# Patient Record
Sex: MALE | Race: WHITE | HISPANIC OR LATINO | Employment: FULL TIME | URBAN - METROPOLITAN AREA
[De-identification: names, ages, dates, MRNs, and addresses within clinical notes are randomized per-mention and may not be internally consistent; named-entity substitution may affect disease eponyms.]

---

## 2018-07-21 ENCOUNTER — HOSPITAL ENCOUNTER (EMERGENCY)
Facility: HOSPITAL | Age: 63
Discharge: HOME | End: 2018-07-21
Attending: FAMILY MEDICINE

## 2018-07-21 VITALS
HEART RATE: 84 BPM | TEMPERATURE: 98 F | BODY MASS INDEX: 29.96 KG/M2 | OXYGEN SATURATION: 98 % | HEIGHT: 71 IN | WEIGHT: 214 LBS | DIASTOLIC BLOOD PRESSURE: 72 MMHG | RESPIRATION RATE: 16 BRPM | SYSTOLIC BLOOD PRESSURE: 121 MMHG

## 2018-07-21 DIAGNOSIS — E86.0 DEHYDRATION: Primary | ICD-10-CM

## 2018-07-21 LAB
ALBUMIN SERPL BCP-MCNC: 4.5 G/DL
ALP SERPL-CCNC: 91 U/L
ALT SERPL W/O P-5'-P-CCNC: 22 U/L
ANION GAP SERPL CALC-SCNC: 13 MMOL/L
ANION GAP SERPL CALC-SCNC: 9 MMOL/L
AST SERPL-CCNC: 23 U/L
BACTERIA #/AREA URNS AUTO: ABNORMAL /HPF
BASOPHILS # BLD AUTO: 0.02 K/UL
BASOPHILS NFR BLD: 0.3 %
BILIRUB SERPL-MCNC: 0.6 MG/DL
BILIRUB UR QL STRIP: ABNORMAL
BUN SERPL-MCNC: 31 MG/DL
BUN SERPL-MCNC: 34 MG/DL
CALCIUM SERPL-MCNC: 7.9 MG/DL
CALCIUM SERPL-MCNC: 9.1 MG/DL
CAOX CRY UR QL COMP ASSIST: ABNORMAL
CHLORIDE SERPL-SCNC: 100 MMOL/L
CHLORIDE SERPL-SCNC: 104 MMOL/L
CLARITY UR REFRACT.AUTO: ABNORMAL
CO2 SERPL-SCNC: 23 MMOL/L
CO2 SERPL-SCNC: 24 MMOL/L
COLOR UR AUTO: ABNORMAL
CREAT SERPL-MCNC: 1.99 MG/DL
CREAT SERPL-MCNC: 2.48 MG/DL
DIFFERENTIAL METHOD: NORMAL
EOSINOPHIL # BLD AUTO: 0.1 K/UL
EOSINOPHIL NFR BLD: 1.9 %
ERYTHROCYTE [DISTWIDTH] IN BLOOD BY AUTOMATED COUNT: 13.9 %
EST. GFR  (AFRICAN AMERICAN): 31.2 ML/MIN/1.73 M^2
EST. GFR  (AFRICAN AMERICAN): 40.7 ML/MIN/1.73 M^2
EST. GFR  (NON AFRICAN AMERICAN): 27 ML/MIN/1.73 M^2
EST. GFR  (NON AFRICAN AMERICAN): 35.2 ML/MIN/1.73 M^2
GLUCOSE SERPL-MCNC: 175 MG/DL
GLUCOSE SERPL-MCNC: 244 MG/DL
GLUCOSE UR QL STRIP: ABNORMAL
HCT VFR BLD AUTO: 44.1 %
HGB BLD-MCNC: 14.6 G/DL
HGB UR QL STRIP: NEGATIVE
HYALINE CASTS UR QL AUTO: 3 /LPF
KETONES UR QL STRIP: ABNORMAL
LEUKOCYTE ESTERASE UR QL STRIP: ABNORMAL
LYMPHOCYTES # BLD AUTO: 1.5 K/UL
LYMPHOCYTES NFR BLD: 22.8 %
MCH RBC QN AUTO: 29.8 PG
MCHC RBC AUTO-ENTMCNC: 33.1 G/DL
MCV RBC AUTO: 90 FL
MICROSCOPIC COMMENT: ABNORMAL
MONOCYTES # BLD AUTO: 0.5 K/UL
MONOCYTES NFR BLD: 7.7 %
NEUTROPHILS # BLD AUTO: 4.3 K/UL
NEUTROPHILS NFR BLD: 67 %
NITRITE UR QL STRIP: NEGATIVE
PH UR STRIP: 5 [PH] (ref 5–8)
PLATELET # BLD AUTO: 263 K/UL
PMV BLD AUTO: 9.6 FL
POCT GLUCOSE: 228 MG/DL (ref 70–110)
POTASSIUM SERPL-SCNC: 4.5 MMOL/L
POTASSIUM SERPL-SCNC: 4.7 MMOL/L
PROT SERPL-MCNC: 8.2 G/DL
PROT UR QL STRIP: ABNORMAL
RBC # BLD AUTO: 4.9 M/UL
RBC #/AREA URNS AUTO: 0 /HPF (ref 0–4)
SODIUM SERPL-SCNC: 136 MMOL/L
SODIUM SERPL-SCNC: 137 MMOL/L
SP GR UR STRIP: 1.02 (ref 1–1.03)
URN SPEC COLLECT METH UR: ABNORMAL
UROBILINOGEN UR STRIP-ACNC: 1 EU/DL
WBC # BLD AUTO: 6.39 K/UL
WBC #/AREA URNS AUTO: 2 /HPF (ref 0–5)

## 2018-07-21 PROCEDURE — 25000003 PHARM REV CODE 250: Performed by: FAMILY MEDICINE

## 2018-07-21 PROCEDURE — 96360 HYDRATION IV INFUSION INIT: CPT

## 2018-07-21 PROCEDURE — 99284 EMERGENCY DEPT VISIT MOD MDM: CPT | Mod: 25

## 2018-07-21 PROCEDURE — 85025 COMPLETE CBC W/AUTO DIFF WBC: CPT

## 2018-07-21 PROCEDURE — 80048 BASIC METABOLIC PNL TOTAL CA: CPT

## 2018-07-21 PROCEDURE — 96372 THER/PROPH/DIAG INJ SC/IM: CPT

## 2018-07-21 PROCEDURE — 63600175 PHARM REV CODE 636 W HCPCS: Performed by: FAMILY MEDICINE

## 2018-07-21 PROCEDURE — 81000 URINALYSIS NONAUTO W/SCOPE: CPT

## 2018-07-21 PROCEDURE — 80053 COMPREHEN METABOLIC PANEL: CPT

## 2018-07-21 PROCEDURE — 82962 GLUCOSE BLOOD TEST: CPT

## 2018-07-21 PROCEDURE — 96361 HYDRATE IV INFUSION ADD-ON: CPT

## 2018-07-21 RX ORDER — INSULIN ASPART 100 [IU]/ML
20 INJECTION, SOLUTION INTRAVENOUS; SUBCUTANEOUS
Status: DISCONTINUED | OUTPATIENT
Start: 2018-07-21 | End: 2018-07-21

## 2018-07-21 RX ORDER — INSULIN ASPART 100 [IU]/ML
10 INJECTION, SOLUTION INTRAVENOUS; SUBCUTANEOUS
Status: COMPLETED | OUTPATIENT
Start: 2018-07-21 | End: 2018-07-21

## 2018-07-21 RX ADMIN — SODIUM CHLORIDE 1000 ML: 0.9 INJECTION, SOLUTION INTRAVENOUS at 08:07

## 2018-07-21 RX ADMIN — SODIUM CHLORIDE 1000 ML: 0.9 INJECTION, SOLUTION INTRAVENOUS at 07:07

## 2018-07-21 RX ADMIN — INSULIN ASPART 10 UNITS: 100 INJECTION, SOLUTION INTRAVENOUS; SUBCUTANEOUS at 07:07

## 2018-07-22 NOTE — ED PROVIDER NOTES
Encounter Date: 7/21/2018       History     Chief Complaint   Patient presents with    Hyperglycemia     Pt reports  today, over 500 yesterday.  Pt c/o fatigue and dysuria this PM. CBG on triage 228.     HPI  Review of patient's allergies indicates:  No Known Allergies  No past medical history on file.  No past surgical history on file.  No family history on file.  Social History   Substance Use Topics    Smoking status: Not on file    Smokeless tobacco: Not on file    Alcohol use Not on file     Review of Systems    Physical Exam     Initial Vitals [07/21/18 1919]   BP Pulse Resp Temp SpO2   (!) 101/55 101 20 97.8 °F (36.6 °C) 95 %      MAP       --         Physical Exam    ED Course   Procedures  Labs Reviewed   POCT GLUCOSE - Abnormal; Notable for the following:        Result Value    POCT Glucose 228 (*)     All other components within normal limits          Imaging Results    None                               Clinical Impression:   The encounter diagnosis was Dehydration.                             Peter Brooks MD  07/21/18 0183

## 2021-06-01 ENCOUNTER — CLINICAL SUPPORT (OUTPATIENT)
Dept: URGENT CARE | Facility: CLINIC | Age: 66
End: 2021-06-01

## 2021-06-01 DIAGNOSIS — Z78.9 NO KNOWN HEALTH PROBLEMS: Primary | ICD-10-CM

## 2021-06-01 LAB
CTP QC/QA: YES
SARS-COV-2 RDRP RESP QL NAA+PROBE: NEGATIVE

## 2021-06-01 PROCEDURE — 99211 OFF/OP EST MAY X REQ PHY/QHP: CPT | Mod: S$GLB,,, | Performed by: FAMILY MEDICINE

## 2021-06-01 PROCEDURE — U0002 COVID-19 LAB TEST NON-CDC: HCPCS | Mod: QW,S$GLB,, | Performed by: FAMILY MEDICINE

## 2021-06-01 PROCEDURE — 99211 PR OFFICE/OUTPT VISIT, EST, LEVL I: ICD-10-PCS | Mod: S$GLB,,, | Performed by: FAMILY MEDICINE

## 2021-06-01 PROCEDURE — U0002: ICD-10-PCS | Mod: QW,S$GLB,, | Performed by: FAMILY MEDICINE

## 2023-05-25 ENCOUNTER — HOSPITAL ENCOUNTER (EMERGENCY)
Facility: HOSPITAL | Age: 68
Discharge: HOME | End: 2023-05-25
Attending: STUDENT IN AN ORGANIZED HEALTH CARE EDUCATION/TRAINING PROGRAM
Payer: OTHER MISCELLANEOUS

## 2023-05-25 VITALS
SYSTOLIC BLOOD PRESSURE: 151 MMHG | RESPIRATION RATE: 16 BRPM | HEART RATE: 59 BPM | TEMPERATURE: 98 F | BODY MASS INDEX: 28.14 KG/M2 | OXYGEN SATURATION: 98 % | WEIGHT: 201 LBS | HEIGHT: 71 IN | DIASTOLIC BLOOD PRESSURE: 52 MMHG

## 2023-05-25 DIAGNOSIS — M25.511 RIGHT SHOULDER PAIN: ICD-10-CM

## 2023-05-25 DIAGNOSIS — R42 LIGHTHEADEDNESS: ICD-10-CM

## 2023-05-25 DIAGNOSIS — M54.2 CERVICAL PAIN (NECK): ICD-10-CM

## 2023-05-25 DIAGNOSIS — R42 DIZZY: ICD-10-CM

## 2023-05-25 LAB
ALBUMIN SERPL BCP-MCNC: 3.9 G/DL (ref 3.5–5.2)
ALP SERPL-CCNC: 64 U/L (ref 55–135)
ALT SERPL W/O P-5'-P-CCNC: 15 U/L (ref 10–44)
ANION GAP SERPL CALC-SCNC: 11 MMOL/L (ref 8–16)
AST SERPL-CCNC: 19 U/L (ref 10–40)
BASOPHILS # BLD AUTO: 0.04 K/UL (ref 0–0.2)
BASOPHILS NFR BLD: 0.5 % (ref 0–1.9)
BILIRUB SERPL-MCNC: 0.2 MG/DL (ref 0.1–1)
BNP SERPL-MCNC: 21 PG/ML (ref 0–99)
BUN SERPL-MCNC: 30 MG/DL (ref 8–23)
CALCIUM SERPL-MCNC: 9.3 MG/DL (ref 8.7–10.5)
CHLORIDE SERPL-SCNC: 105 MMOL/L (ref 95–110)
CO2 SERPL-SCNC: 23 MMOL/L (ref 23–29)
CREAT SERPL-MCNC: 1.4 MG/DL (ref 0.5–1.4)
DIFFERENTIAL METHOD BLD: ABNORMAL
EOSINOPHIL # BLD AUTO: 0.2 K/UL (ref 0–0.5)
EOSINOPHIL NFR BLD: 2.6 % (ref 0–8)
ERYTHROCYTE [DISTWIDTH] IN BLOOD BY AUTOMATED COUNT: 13.6 % (ref 11.5–14.5)
EST. GFR  (NO RACE VARIABLE): 55 ML/MIN/1.73 M^2
GLUCOSE SERPL-MCNC: 130 MG/DL (ref 70–110)
HCT VFR BLD AUTO: 42.6 % (ref 40–54)
HGB BLD-MCNC: 13.9 G/DL (ref 14–18)
IMM GRANULOCYTES # BLD AUTO: 0.01 K/UL (ref 0–0.04)
IMM GRANULOCYTES NFR BLD AUTO: 0.1 % (ref 0–0.5)
LYMPHOCYTES # BLD AUTO: 3.1 K/UL (ref 1–4.8)
LYMPHOCYTES NFR BLD: 38.9 % (ref 18–48)
MCH RBC QN AUTO: 30.2 PG (ref 27–31)
MCHC RBC AUTO-ENTMCNC: 32.6 G/DL (ref 32–36)
MCV RBC AUTO: 93 FL (ref 82–98)
MONOCYTES # BLD AUTO: 0.8 K/UL (ref 0.3–1)
MONOCYTES NFR BLD: 9.8 % (ref 4–15)
NEUTROPHILS # BLD AUTO: 3.9 K/UL (ref 1.8–7.7)
NEUTROPHILS NFR BLD: 48.1 % (ref 38–73)
NRBC BLD-RTO: 0 /100 WBC
PLATELET # BLD AUTO: 249 K/UL (ref 150–450)
PMV BLD AUTO: 9.2 FL (ref 9.2–12.9)
POTASSIUM SERPL-SCNC: 4.4 MMOL/L (ref 3.5–5.1)
PROT SERPL-MCNC: 7.7 G/DL (ref 6–8.4)
RBC # BLD AUTO: 4.6 M/UL (ref 4.6–6.2)
SODIUM SERPL-SCNC: 139 MMOL/L (ref 136–145)
TROPONIN I SERPL DL<=0.01 NG/ML-MCNC: 0.01 NG/ML (ref 0–0.03)
WBC # BLD AUTO: 8.05 K/UL (ref 3.9–12.7)

## 2023-05-25 PROCEDURE — 96374 THER/PROPH/DIAG INJ IV PUSH: CPT

## 2023-05-25 PROCEDURE — 80053 COMPREHEN METABOLIC PANEL: CPT | Performed by: PHYSICIAN ASSISTANT

## 2023-05-25 PROCEDURE — 93010 ELECTROCARDIOGRAM REPORT: CPT | Mod: ,,, | Performed by: INTERNAL MEDICINE

## 2023-05-25 PROCEDURE — 84484 ASSAY OF TROPONIN QUANT: CPT | Performed by: PHYSICIAN ASSISTANT

## 2023-05-25 PROCEDURE — 25000003 PHARM REV CODE 250: Performed by: STUDENT IN AN ORGANIZED HEALTH CARE EDUCATION/TRAINING PROGRAM

## 2023-05-25 PROCEDURE — 63600175 PHARM REV CODE 636 W HCPCS: Performed by: STUDENT IN AN ORGANIZED HEALTH CARE EDUCATION/TRAINING PROGRAM

## 2023-05-25 PROCEDURE — 85025 COMPLETE CBC W/AUTO DIFF WBC: CPT | Performed by: PHYSICIAN ASSISTANT

## 2023-05-25 PROCEDURE — 99285 EMERGENCY DEPT VISIT HI MDM: CPT | Mod: 25

## 2023-05-25 PROCEDURE — 96375 TX/PRO/DX INJ NEW DRUG ADDON: CPT

## 2023-05-25 PROCEDURE — 83880 ASSAY OF NATRIURETIC PEPTIDE: CPT | Performed by: PHYSICIAN ASSISTANT

## 2023-05-25 PROCEDURE — 93005 ELECTROCARDIOGRAM TRACING: CPT

## 2023-05-25 RX ORDER — MECLIZINE HYDROCHLORIDE 25 MG/1
50 TABLET ORAL
Status: COMPLETED | OUTPATIENT
Start: 2023-05-25 | End: 2023-05-25

## 2023-05-25 RX ORDER — METHOCARBAMOL 500 MG/1
500 TABLET, FILM COATED ORAL 3 TIMES DAILY
Qty: 20 TABLET | Refills: 0 | Status: SHIPPED | OUTPATIENT
Start: 2023-05-25 | End: 2023-06-01

## 2023-05-25 RX ORDER — KETOROLAC TROMETHAMINE 10 MG/1
10 TABLET, FILM COATED ORAL EVERY 8 HOURS
Qty: 15 TABLET | Refills: 0 | Status: SHIPPED | OUTPATIENT
Start: 2023-05-25 | End: 2023-05-30

## 2023-05-25 RX ORDER — KETOROLAC TROMETHAMINE 30 MG/ML
15 INJECTION, SOLUTION INTRAMUSCULAR; INTRAVENOUS
Status: COMPLETED | OUTPATIENT
Start: 2023-05-25 | End: 2023-05-25

## 2023-05-25 RX ORDER — MECLIZINE HYDROCHLORIDE 25 MG/1
25 TABLET ORAL 3 TIMES DAILY PRN
Qty: 20 TABLET | Refills: 0 | Status: SHIPPED | OUTPATIENT
Start: 2023-05-25 | End: 2023-06-18

## 2023-05-25 RX ORDER — ORPHENADRINE CITRATE 30 MG/ML
30 INJECTION INTRAMUSCULAR; INTRAVENOUS
Status: COMPLETED | OUTPATIENT
Start: 2023-05-25 | End: 2023-05-25

## 2023-05-25 RX ADMIN — MECLIZINE HYDROCHLORIDE 50 MG: 25 TABLET ORAL at 08:05

## 2023-05-25 RX ADMIN — SODIUM CHLORIDE 1000 ML: 0.9 INJECTION, SOLUTION INTRAVENOUS at 09:05

## 2023-05-25 RX ADMIN — ORPHENADRINE CITRATE 30 MG: 60 INJECTION INTRAMUSCULAR; INTRAVENOUS at 09:05

## 2023-05-25 RX ADMIN — KETOROLAC TROMETHAMINE 15 MG: 30 INJECTION INTRAMUSCULAR; INTRAVENOUS at 08:05

## 2023-05-26 NOTE — FIRST PROVIDER EVALUATION
Emergency Department TeleTriage Encounter Note      CHIEF COMPLAINT    Chief Complaint   Patient presents with    Dizziness     Reports episodes of dizziness with activity x2 weeks. Pt. Had one syncopal episode. Associated symptom is neck pain (c-spine). Denies trauma or injury. No dizziness at rest but neck pain is constant. Pt. Works outside but reports adequate hydration.        VITAL SIGNS   Initial Vitals [05/25/23 1904]   BP Pulse Resp Temp SpO2   128/77 71 20 98.1 °F (36.7 °C) 100 %      MAP       --            ALLERGIES    Review of patient's allergies indicates:  No Known Allergies    PROVIDER TRIAGE NOTE  Patient presents with posterior neck pain for a few weeks. He has also had some episodes of feeling lightheaded and blurred vision. No chest pain. No injury. No spinning dizziness      ORDERS  Labs Reviewed - No data to display    ED Orders (720h ago, onward)      Start Ordered     Status Ordering Provider    05/25/23 1908 05/25/23 1907  EKG 12-lead  Once         Completed by FLIP BOBO on 5/25/2023 at  7:22 PM [facility]              Virtual Visit Note: The provider triage portion of this emergency department evaluation and documentation was performed via [employer], a HIPAA-compliant telemedicine application, in concert with a tele-presenter in the room. A face to face patient evaluation with one of my colleagues will occur once the patient is placed in an emergency department room.      DISCLAIMER: This note was prepared with [employer] voice recognition transcription software. Garbled syntax, mangled pronouns, and other bizarre constructions may be attributed to that software system.    
Home

## 2023-05-26 NOTE — ED PROVIDER NOTES
Encounter Date: 5/25/2023    SCRIBE #1 NOTE: I, Elsa Moore, am scribing for, and in the presence of,  Tim Segura MD. I have scribed the following portions of the note - Other sections scribed: HPI, ROS, PE.     History     Chief Complaint   Patient presents with    Dizziness     Reports episodes of dizziness with activity x2 weeks. Pt. Had one syncopal episode. Associated symptom is neck pain (c-spine). Denies trauma or injury. No dizziness at rest but neck pain is constant. Pt. Works outside but reports adequate hydration.      A 68-year-old male with a PMHx of DM and hypertension presents to the ED for multiple medical complains. He reports having intermittent dizziness with blurry vision that began one week ago. He describes it as feeling light-headed with heaviness in his head. He states his symptoms occur often regardless if he is at rest, standing up or moving. However, he shares his dizziness and blurry vision do not occur at the same time. He works outside in the sun and thinks this could be a contributing factor. He denies wearing sunglasses while he works. He also reports having one-week history of right shoulder pain. 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 Lastly, he states he has neck pain that began two weeks ago. He denies any injuries or trauma to the area. He reports his symptoms were a sudden onset. He states it feels like he cannot carry the weight of his head.          The history is provided by the patient and a relative.   Review of patient's allergies indicates:  No Known Allergies  No past medical history on file.  No past surgical history on file.  No family history on file.     Review of Systems   Constitutional:  Negative for chills and fever.   HENT:  Negative for congestion, facial swelling, rhinorrhea and sore throat.    Eyes:  Positive for visual disturbance.   Respiratory:  Negative for cough and shortness of breath.    Cardiovascular:  Negative for chest  pain.   Gastrointestinal:  Negative for abdominal pain, constipation, nausea and vomiting.   Genitourinary:  Negative for dysuria, frequency and hematuria.   Musculoskeletal:  Positive for neck pain. Negative for arthralgias, back pain and myalgias.        Right shoulder pain   Skin:  Negative for rash and wound.   Neurological:  Positive for dizziness. Negative for weakness and numbness.   Hematological:  Does not bruise/bleed easily.   Psychiatric/Behavioral:  Negative for hallucinations and suicidal ideas.      Physical Exam     Initial Vitals [05/25/23 1904]   BP Pulse Resp Temp SpO2   128/77 71 20 98.1 °F (36.7 °C) 100 %      MAP       --         Physical Exam    Nursing note and vitals reviewed.  Constitutional: He appears well-developed and well-nourished. No distress.   HENT:   Head: Normocephalic and atraumatic.   Right Ear: External ear normal.   Left Ear: External ear normal.   Nose: Nose normal.   Mouth/Throat: Oropharynx is clear and moist.   Eyes: Conjunctivae are normal. Pupils are equal, round, and reactive to light. Left eye exhibits nystagmus (left beating nystagmus).   Neck: Neck supple.   Normal range of motion.  Cardiovascular:  Normal rate, regular rhythm and normal heart sounds.           No murmur heard.  Pulmonary/Chest: Breath sounds normal. No stridor. No respiratory distress. He has no wheezes. He has no rhonchi. He has no rales.   Abdominal: Abdomen is soft. Bowel sounds are normal. There is no abdominal tenderness.   Musculoskeletal:         General: No edema. Normal range of motion.      Cervical back: Normal range of motion and neck supple.      Comments: No c-spine tenderness   Normal strength      Neurological: He is alert and oriented to person, place, and time. He has normal strength. No cranial nerve deficit or sensory deficit.   Skin: Skin is warm and dry. No rash noted.   Psychiatric: He has a normal mood and affect. Thought content normal.       ED Course   Procedures  Labs  Reviewed   CBC W/ AUTO DIFFERENTIAL - Abnormal; Notable for the following components:       Result Value    Hemoglobin 13.9 (*)     All other components within normal limits   COMPREHENSIVE METABOLIC PANEL - Abnormal; Notable for the following components:    Glucose 130 (*)     BUN 30 (*)     eGFR 55 (*)     All other components within normal limits   B-TYPE NATRIURETIC PEPTIDE   TROPONIN I     EKG Readings: (Independently Interpreted)   Initial Reading: No STEMI. Rhythm: Normal Sinus Rhythm. Heart Rate: 72. Ectopy: No Ectopy. Axis: Left Axis Deviation.   ECG Results              EKG 12-lead (Final result)  Result time 05/26/23 13:44:52      Final result by Interface, Lab In [facility] (05/26/23 13:44:52)                   Narrative:    Test Reason : R42,    Vent. Rate : 072 BPM     Atrial Rate : 072 BPM     P-R Int : 150 ms          QRS Dur : 090 ms      QT Int : 400 ms       P-R-T Axes : 038 -46 097 degrees     QTc Int : 438 ms    Normal sinus rhythm  Left axis deviation  Nonspecific ST and T wave abnormality  Abnormal ECG  No previous ECGs available  Confirmed by Kalpana Prince MD ([id number]) on 5/26/2023 1:44:37 PM    Referred By: [email or web address]   SELF           Confirmed By:Kalpana Prince MD                                  Imaging Results              X-Ray Shoulder Complete 2 View Right (Final result)  Result time 05/25/23 21:58:05      Final result by Isai Krause MD (05/25/23 21:58:05)                   Impression:      Osteoarthritic changes in the right shoulder with no acute fracture or dislocation.      Electronically signed by: Isai Krause  Date:    05/25/2023  Time:    21:58               Narrative:    EXAMINATION:  XR SHOULDER COMPLETE 2 OR MORE VIEWS RIGHT    CLINICAL HISTORY:  Pain in right shoulder    TECHNIQUE:  Two or three views of the right shoulder were performed.    COMPARISON:  None    FINDINGS:  Osteoarthritis in the glenohumeral and AC joint on the right are noted.  There is no evidence of  fracture or dislocation.  Adjacent chest wall appears intact.                                       X-Ray Cervical Spine AP And Lateral (Final result)  Result time 05/25/23 22:14:14      Final result by Divina Krause MD (05/25/23 22:14:14)                   Impression:      No acute abnormality involving the cervical spine.    Cervical spondylosis without subluxation.      Electronically signed by: Divina Krause  Date:    05/25/2023  Time:    22:14               Narrative:    EXAMINATION:  XR CERVICAL SPINE AP LATERAL    CLINICAL HISTORY:  Cervicalgia    TECHNIQUE:  AP, lateral and open mouth views of the cervical spine were performed.    COMPARISON:  None.    FINDINGS:  There is no evidence of acute fracture or subluxation of the cervical spine.  The odontoid is intact.  There is spondylosis with disc narrowing most pronounced at C 5-C6.  Lucency through the C5 vertebra on the lateral view only is likely artifact.  Vascular calcifications are noted in the carotid arteries..  X-ray imaged upper lungs appear clear.                                    X-Rays:   Independently Interpreted Readings:   Other Readings:  Degenerative changes  Medications   ketorolac injection 15 mg (15 mg Intravenous Given 5/25/23 2059)   orphenadrine injection 30 mg (30 mg Intravenous Given 5/25/23 2100)   meclizine tablet 50 mg (50 mg Oral Given 5/25/23 2059)   sodium chloride 0.9% bolus 1,000 mL 1,000 mL (1,000 mLs Intravenous New Bag 5/25/23 2100)     Medical Decision Making:   Independently Interpreted Test(s):   I have ordered and independently interpreted X-rays - see prior notes.  I have ordered and independently interpreted EKG Reading(s) - see prior notes  Clinical Tests:   Lab Tests: Ordered and Reviewed  Radiological Study: Ordered and Reviewed  Medical Tests: Ordered and Reviewed  ED Management:  68-year-old male with shoulder and neck pain.  Symptoms improved with treatment in emergency department..  Differential  including Muscular strain, contusion, fracture, dislocation, arthritis.  Changes do appear degenerative and chronic on x-ray.  No acute findings.  Discussed these with patient and family member who will discuss with PCP to obtain advanced imaging as needed.    Patient also with episodic, fatigable, reproducible, positional vertigo/lightheadedness.  Low suspicion of any central cause of these symptoms.  Felt these symptoms improved with meclizine.  Advised continued hydration and prescribed meclizine for as needed treatment of symptoms.  Should be safe for follow up with PCP regarding these symptoms.          Scribe Attestation:   Scribe #1: I performed the above scribed service and the documentation accurately describes the services I performed. I attest to the accuracy of the note.                 I, Tim Segura MD personally performed the services described in this documentation. All medical record entries made by the scribe were at my direction and in my presence.  I have reviewed the chart and agree that the record reflects my personal performance and is accurate and complete.   3:29 AM 05/27/2023       DISCLAIMER: This note was prepared with [employer] Direct voice recognition transcription software. Garbled syntax, mangled pronouns, and other bizarre constructions may be attributed to that software system.    Clinical Impression:   Final diagnoses:  [R42] Dizzy  [R42] Lightheadedness  [M54.2] Cervical pain (neck)  [M25.511] Right shoulder pain        ED Disposition Condition    Discharge Stable          ED Prescriptions       Medication Sig Dispense Start Date End Date Auth. Provider    ketorolac (TORADOL) 10 mg tablet Take 1 tablet (10 mg total) by mouth every 8 (eight) hours. for 5 days 15 tablet 5/25/2023 5/30/2023 Tim Segura MD    methocarbamoL (ROBAXIN) 500 MG Tab Take 1 tablet (500 mg total) by mouth 3 (three) times daily. for 7 days 20 tablet 5/25/2023 6/1/2023 Tim Segura MD    meclizine  (ANTIVERT) 25 mg tablet Take 1 tablet (25 mg total) by mouth 3 (three) times daily as needed for Dizziness. 20 tablet 5/25/2023 -- Tim Segura MD          Follow-up Information       Follow up With Specialties Details Why Contact Info    Primary care provider of your choice  Schedule an appointment as soon as possible for a visit in 5 days For follow-up on today's visit.     [facility] Emergency Dept Emergency Medicine Go to  As needed, If symptoms worsen [address] [facility] [address] [phone]             Tim Segura MD  05/27/23 9577

## 2023-06-13 ENCOUNTER — HOSPITAL ENCOUNTER (OUTPATIENT)
Facility: HOSPITAL | Age: 68
Discharge: HOME | DRG: 083 | End: 2023-06-15
Attending: EMERGENCY MEDICINE | Admitting: EMERGENCY MEDICINE
Payer: OTHER MISCELLANEOUS

## 2023-06-13 DIAGNOSIS — T67.5XXA HEAT EXHAUSTION, INITIAL ENCOUNTER: ICD-10-CM

## 2023-06-13 DIAGNOSIS — E86.1 HYPOTENSION DUE TO HYPOVOLEMIA: Primary | ICD-10-CM

## 2023-06-13 DIAGNOSIS — N17.9 AKI (ACUTE KIDNEY INJURY): ICD-10-CM

## 2023-06-13 DIAGNOSIS — E87.5 HYPERKALEMIA: ICD-10-CM

## 2023-06-13 DIAGNOSIS — E11.65 UNCONTROLLED TYPE 2 DIABETES MELLITUS WITH HYPERGLYCEMIA: ICD-10-CM

## 2023-06-13 DIAGNOSIS — R07.9 CHEST PAIN: ICD-10-CM

## 2023-06-13 DIAGNOSIS — S06.359A: ICD-10-CM
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
## 2023-06-13 DIAGNOSIS — R55 RECURRENT SYNCOPE: ICD-10-CM

## 2023-06-13 DIAGNOSIS — R55 SYNCOPE: ICD-10-CM

## 2023-06-13 PROBLEM — E78.5 HYPERLIPIDEMIA: Status: ACTIVE | Noted: 2023-06-13

## 2023-06-13 PROBLEM — E78.5 HYPERLIPIDEMIA: Chronic | Status: ACTIVE | Noted: 2023-06-13

## 2023-06-13 PROBLEM — E03.9 HYPOTHYROIDISM: Status: ACTIVE | Noted: 2017-03-16

## 2023-06-13 PROBLEM — E03.9 HYPOTHYROIDISM: Chronic | Status: ACTIVE | Noted: 2017-03-16

## 2023-06-13 LAB
ALBUMIN SERPL BCP-MCNC: 4.1 G/DL (ref 3.5–5.2)
ALP SERPL-CCNC: 82 U/L (ref 55–135)
ALT SERPL W/O P-5'-P-CCNC: 16 U/L (ref 10–44)
ANION GAP SERPL CALC-SCNC: 14 MMOL/L (ref 8–16)
AST SERPL-CCNC: 20 U/L (ref 10–40)
BASOPHILS # BLD AUTO: 0.06 K/UL (ref 0–0.2)
BASOPHILS NFR BLD: 0.5 % (ref 0–1.9)
BILIRUB SERPL-MCNC: 0.4 MG/DL (ref 0.1–1)
BILIRUB UR QL STRIP: NEGATIVE
BUN SERPL-MCNC: 35 MG/DL (ref 8–23)
CALCIUM SERPL-MCNC: 10.2 MG/DL (ref 8.7–10.5)
CHLORIDE SERPL-SCNC: 108 MMOL/L (ref 95–110)
CK SERPL-CCNC: 130 U/L (ref 20–200)
CLARITY UR: ABNORMAL
CO2 SERPL-SCNC: 17 MMOL/L (ref 23–29)
COLOR UR: YELLOW
CREAT SERPL-MCNC: 2.3 MG/DL (ref 0.5–1.4)
DIFFERENTIAL METHOD: ABNORMAL
EOSINOPHIL # BLD AUTO: 0.2 K/UL (ref 0–0.5)
EOSINOPHIL NFR BLD: 1.5 % (ref 0–8)
ERYTHROCYTE [DISTWIDTH] IN BLOOD BY AUTOMATED COUNT: 13.8 % (ref 11.5–14.5)
EST. GFR  (NO RACE VARIABLE): 31 ML/MIN/1.73 M^2
GLUCOSE SERPL-MCNC: 248 MG/DL (ref 70–110)
GLUCOSE UR QL STRIP: ABNORMAL
HCT VFR BLD AUTO: 46.5 % (ref 40–54)
HGB BLD-MCNC: 15.2 G/DL (ref 14–18)
HGB UR QL STRIP: NEGATIVE
IMM GRANULOCYTES # BLD AUTO: 0.07 K/UL (ref 0–0.04)
IMM GRANULOCYTES NFR BLD AUTO: 0.6 % (ref 0–0.5)
KETONES UR QL STRIP: NEGATIVE
LEUKOCYTE ESTERASE UR QL STRIP: NEGATIVE
LYMPHOCYTES # BLD AUTO: 2.7 K/UL (ref 1–4.8)
LYMPHOCYTES NFR BLD: 24.1 % (ref 18–48)
MAGNESIUM SERPL-MCNC: 1.7 MG/DL (ref 1.6–2.6)
MCH RBC QN AUTO: 30.3 PG (ref 27–31)
MCHC RBC AUTO-ENTMCNC: 32.7 G/DL (ref 32–36)
MCV RBC AUTO: 93 FL (ref 82–98)
MONOCYTES # BLD AUTO: 0.8 K/UL (ref 0.3–1)
MONOCYTES NFR BLD: 7.4 % (ref 4–15)
NEUTROPHILS # BLD AUTO: 7.3 K/UL (ref 1.8–7.7)
NEUTROPHILS NFR BLD: 65.9 % (ref 38–73)
NITRITE UR QL STRIP: NEGATIVE
NRBC BLD-RTO: 0 /100 WBC
PH UR STRIP: 5 [PH] (ref 5–8)
PLATELET # BLD AUTO: 258 K/UL (ref 150–450)
PMV BLD AUTO: 9.5 FL (ref 9.2–12.9)
POCT GLUCOSE: 159 MG/DL (ref 70–110)
POTASSIUM SERPL-SCNC: 5.8 MMOL/L (ref 3.5–5.1)
PROT SERPL-MCNC: 8.1 G/DL (ref 6–8.4)
PROT UR QL STRIP: ABNORMAL
RBC # BLD AUTO: 5.02 M/UL (ref 4.6–6.2)
SODIUM SERPL-SCNC: 139 MMOL/L (ref 136–145)
SP GR UR STRIP: 1.02 (ref 1–1.03)
TROPONIN I SERPL DL<=0.01 NG/ML-MCNC: 0.02 NG/ML (ref 0–0.03)
URN SPEC COLLECT METH UR: ABNORMAL
UROBILINOGEN UR STRIP-ACNC: NEGATIVE EU/DL
WBC # BLD AUTO: 11.07 K/UL (ref 3.9–12.7)

## 2023-06-13 PROCEDURE — 25000003 PHARM REV CODE 250: Performed by: HOSPITALIST

## 2023-06-13 PROCEDURE — 80053 COMPREHEN METABOLIC PANEL: CPT | Performed by: EMERGENCY MEDICINE

## 2023-06-13 PROCEDURE — 93010 ELECTROCARDIOGRAM REPORT: CPT | Mod: ,,, | Performed by: INTERNAL MEDICINE

## 2023-06-13 PROCEDURE — 93010 EKG 12-LEAD: ICD-10-PCS | Mod: ,,, | Performed by: INTERNAL MEDICINE

## 2023-06-13 PROCEDURE — 81003 URINALYSIS AUTO W/O SCOPE: CPT | Performed by: EMERGENCY MEDICINE

## 2023-06-13 PROCEDURE — 82550 ASSAY OF CK (CPK): CPT | Performed by: EMERGENCY MEDICINE

## 2023-06-13 PROCEDURE — 96360 HYDRATION IV INFUSION INIT: CPT

## 2023-06-13 PROCEDURE — 93005 ELECTROCARDIOGRAM TRACING: CPT

## 2023-06-13 PROCEDURE — 83735 ASSAY OF MAGNESIUM: CPT | Performed by: EMERGENCY MEDICINE

## 2023-06-13 PROCEDURE — G0378 HOSPITAL OBSERVATION PER HR: HCPCS

## 2023-06-13 PROCEDURE — 96361 HYDRATE IV INFUSION ADD-ON: CPT

## 2023-06-13 PROCEDURE — 84484 ASSAY OF TROPONIN QUANT: CPT | Performed by: EMERGENCY MEDICINE

## 2023-06-13 PROCEDURE — 99285 EMERGENCY DEPT VISIT HI MDM: CPT | Mod: 25

## 2023-06-13 PROCEDURE — 83036 HEMOGLOBIN GLYCOSYLATED A1C: CPT | Performed by: STUDENT IN AN ORGANIZED HEALTH CARE EDUCATION/TRAINING PROGRAM

## 2023-06-13 PROCEDURE — 85025 COMPLETE CBC W/AUTO DIFF WBC: CPT | Performed by: EMERGENCY MEDICINE

## 2023-06-13 PROCEDURE — 25000003 PHARM REV CODE 250: Performed by: EMERGENCY MEDICINE

## 2023-06-13 RX ORDER — CYCLOBENZAPRINE HCL 10 MG
10 TABLET ORAL
Status: COMPLETED | OUTPATIENT
Start: 2023-06-13 | End: 2023-06-13

## 2023-06-13 RX ORDER — GLUCAGON 1 MG
1 KIT INJECTION
Status: DISCONTINUED | OUTPATIENT
Start: 2023-06-13 | End: 2023-06-15 | Stop reason: HOSPADM

## 2023-06-13 RX ORDER — PROCHLORPERAZINE EDISYLATE 5 MG/ML
5 INJECTION INTRAMUSCULAR; INTRAVENOUS EVERY 6 HOURS PRN
Status: DISCONTINUED | OUTPATIENT
Start: 2023-06-13 | End: 2023-06-15 | Stop reason: HOSPADM

## 2023-06-13 RX ORDER — MAG HYDROX/ALUMINUM HYD/SIMETH 200-200-20
30 SUSPENSION, ORAL (FINAL DOSE FORM) ORAL 4 TIMES DAILY PRN
Status: DISCONTINUED | OUTPATIENT
Start: 2023-06-13 | End: 2023-06-15 | Stop reason: HOSPADM

## 2023-06-13 RX ORDER — DEXTROSE 40 %
15 GEL (GRAM) ORAL
Status: DISCONTINUED | OUTPATIENT
Start: 2023-06-13 | End: 2023-06-15 | Stop reason: HOSPADM

## 2023-06-13 RX ORDER — LISINOPRIL 40 MG/1
40 TABLET ORAL DAILY
COMMUNITY
Start: 2023-05-06

## 2023-06-13 RX ORDER — PRAVASTATIN SODIUM 20 MG/1
20 TABLET ORAL DAILY
COMMUNITY
Start: 2023-05-04

## 2023-06-13 RX ORDER — PRAVASTATIN SODIUM 10 MG/1
20 TABLET ORAL DAILY
Status: DISCONTINUED | OUTPATIENT
Start: 2023-06-14 | End: 2023-06-15 | Stop reason: HOSPADM

## 2023-06-13 RX ORDER — ACETAMINOPHEN 325 MG/1
650 TABLET ORAL EVERY 6 HOURS PRN
Status: DISCONTINUED | OUTPATIENT
Start: 2023-06-13 | End: 2023-06-15 | Stop reason: HOSPADM

## 2023-06-13 RX ORDER — DEXTROSE 40 %
30 GEL (GRAM) ORAL
Status: DISCONTINUED | OUTPATIENT
Start: 2023-06-13 | End: 2023-06-15 | Stop reason: HOSPADM

## 2023-06-13 RX ORDER — INSULIN GLARGINE 100 [IU]/ML
20 INJECTION, SOLUTION SUBCUTANEOUS NIGHTLY
Status: ON HOLD | COMMUNITY
End: 2023-06-15 | Stop reason: HOSPADM

## 2023-06-13 RX ORDER — HYDROCODONE BITARTRATE AND ACETAMINOPHEN 5; 325 MG/1; MG/1
1 TABLET ORAL
Status: COMPLETED | OUTPATIENT
Start: 2023-06-13 | End: 2023-06-13

## 2023-06-13 RX ORDER — LEVOTHYROXINE SODIUM 50 UG/1
50 TABLET ORAL
Status: DISCONTINUED | OUTPATIENT
Start: 2023-06-14 | End: 2023-06-15 | Stop reason: HOSPADM

## 2023-06-13 RX ORDER — SIMETHICONE 80 MG
1 TABLET,CHEWABLE ORAL 4 TIMES DAILY PRN
Status: DISCONTINUED | OUTPATIENT
Start: 2023-06-13 | End: 2023-06-15 | Stop reason: HOSPADM

## 2023-06-13 RX ORDER — GABAPENTIN 300 MG/1
300 CAPSULE ORAL 3 TIMES DAILY
Status: DISCONTINUED | OUTPATIENT
Start: 2023-06-13 | End: 2023-06-15 | Stop reason: HOSPADM

## 2023-06-13 RX ORDER — ONDANSETRON 2 MG/ML
4 INJECTION INTRAMUSCULAR; INTRAVENOUS EVERY 8 HOURS PRN
Status: DISCONTINUED | OUTPATIENT
Start: 2023-06-13 | End: 2023-06-15 | Stop reason: HOSPADM

## 2023-06-13 RX ORDER — LEVOTHYROXINE SODIUM 50 UG/1
50 TABLET ORAL DAILY
COMMUNITY
Start: 2023-05-06 | End: 2023-09-11

## 2023-06-13 RX ORDER — TALC
6 POWDER (GRAM) TOPICAL NIGHTLY PRN
Status: DISCONTINUED | OUTPATIENT
Start: 2023-06-13 | End: 2023-06-15 | Stop reason: HOSPADM

## 2023-06-13 RX ORDER — TIZANIDINE 4 MG/1
4 TABLET ORAL EVERY 8 HOURS PRN
Status: DISCONTINUED | OUTPATIENT
Start: 2023-06-13 | End: 2023-06-14

## 2023-06-13 RX ORDER — SODIUM CHLORIDE 9 MG/ML
INJECTION, SOLUTION INTRAVENOUS CONTINUOUS
Status: DISCONTINUED | OUTPATIENT
Start: 2023-06-13 | End: 2023-06-15 | Stop reason: HOSPADM

## 2023-06-13 RX ORDER — GABAPENTIN 300 MG/1
300 CAPSULE ORAL 3 TIMES DAILY
COMMUNITY
End: 2024-01-11 | Stop reason: SDUPTHER

## 2023-06-13 RX ORDER — NALOXONE HCL 0.4 MG/ML
0.02 VIAL (ML) INJECTION
Status: DISCONTINUED | OUTPATIENT
Start: 2023-06-13 | End: 2023-06-15 | Stop reason: HOSPADM

## 2023-06-13 RX ORDER — METFORMIN HYDROCHLORIDE 1000 MG/1
1000 TABLET ORAL 2 TIMES DAILY
COMMUNITY

## 2023-06-13 RX ORDER — HUMAN INSULIN 100 [USP'U]/ML
15 INJECTION, SUSPENSION SUBCUTANEOUS 2 TIMES DAILY
COMMUNITY
Start: 2023-05-08

## 2023-06-13 RX ORDER — SODIUM CHLORIDE 0.9 % (FLUSH) 0.9 %
10 SYRINGE (ML) INJECTION EVERY 8 HOURS PRN
Status: DISCONTINUED | OUTPATIENT
Start: 2023-06-13 | End: 2023-06-15 | Stop reason: HOSPADM

## 2023-06-13 RX ORDER — POLYETHYLENE GLYCOL 3350 17 G/17G
17 POWDER, FOR SOLUTION ORAL DAILY
Status: DISCONTINUED | OUTPATIENT
Start: 2023-06-14 | End: 2023-06-15 | Stop reason: HOSPADM

## 2023-06-13 RX ORDER — INSULIN ASPART 100 [IU]/ML
1-10 INJECTION, SOLUTION INTRAVENOUS; SUBCUTANEOUS
Status: DISCONTINUED | OUTPATIENT
Start: 2023-06-13 | End: 2023-06-15 | Stop reason: HOSPADM

## 2023-06-13 RX ADMIN — ACETAMINOPHEN 650 MG: 325 TABLET ORAL at 08:06

## 2023-06-13 RX ADMIN — SODIUM CHLORIDE 2000 ML: 9 INJECTION, SOLUTION INTRAVENOUS at 12:06

## 2023-06-13 RX ADMIN — HYDROCODONE BITARTRATE AND ACETAMINOPHEN 1 TABLET: 5; 325 TABLET ORAL at 03:06

## 2023-06-13 RX ADMIN — TIZANIDINE 4 MG: 4 TABLET ORAL at 09:06

## 2023-06-13 RX ADMIN — CYCLOBENZAPRINE 10 MG: 10 TABLET, FILM COATED ORAL at 03:06

## 2023-06-13 RX ADMIN — GABAPENTIN 300 MG: 300 CAPSULE ORAL at 08:06

## 2023-06-13 RX ADMIN — SODIUM CHLORIDE: 9 INJECTION, SOLUTION INTRAVENOUS at 08:06

## 2023-06-13 NOTE — PLAN OF CARE
Case Management Assessment     PCP: Mihai Jeffries  Pharmacy: [facility] in [place]    Patient Arrived From: home  Existing Help at Home: wife    Barriers to Discharge: none    Discharge Plan:    A. Home with family   B. Home with family      SW completed brief assessment and discussed discharge planning with patient at his bedside. Patient stated that he lives with his wife who is her main support. Patient's daughter will provide transportation for him to get home when discharge from the hospital.      06/13/23 5301   Discharge Planning   Assessment Type Discharge Planning Assessment   Resource/Environmental Concerns none   Support Systems Spouse/significant other;Children   Equipment Currently Used at Home none   Current Living Arrangements home   Patient/Family Anticipates Transition to home   Patient/Family Anticipated Services at Transition none   DME Needed Upon Discharge  none   Discharge Plan A Home with family   Discharge Plan B Home with family

## 2023-06-13 NOTE — ED PROVIDER NOTES
"Encounter Date: 6/13/2023    SCRIBE #1 NOTE: I, Beverly Aranda, am scribing for, and in the presence of,  Hemanth Deutsch MD. I have scribed the following portions of the note - Other sections scribed: HPI, ROS.     History     Chief Complaint   Patient presents with    Loss of Consciousness     Pt BIB EMS after having witnessed syncopal episode at job site. Per coworkers, pt fell twice. Pt was initially confused and diaphoretic per EMS. Blood pressure for EMS was 110/60. Denies any pain.     This 68 y.o male, with a medical history Diabetes mellitus and Hypertension, presents to the ED via EMS transportation accompanied by his daughter s/p a witnessed syncopal episode that occurred PTA. Per daughter, pt's coworkers reported that pt passed out at work today and upon waking could not remember where he was or what happened prompting EMS to be called. Pt reports that he became dizzy prior to the event, but notes that he does not remember passing out. Daughter states that pt experienced a similar episode while at work 2 weeks ago, noting that at that time he became light-headed and passed out. Pt does not remember becoming light-headed at that time. Daughter notes that he was subsequently seen at [facility] ED where he was diagnosed with possible vertigo. She states that pt was also experiencing posterior neck pain (presently persisting) at that time and had an x-ray of the neck preformed. He has since followed up with his PCP, but daughter feels as though concern for pt's symptoms were dismissed at that time. Daughter reports that pt works outside in the heat and only experiences these syncopal episode (vision goes dark and sees stars prior to episode) when at work. Prior to the episode 2x weeks ago pt passed once a couple of years ago also while at work. She states that pt presently does not appear confused, but notes that he is "sort of" behaving like his normal self. Of note, pt is currently on insulin and " Metformin for his Diabetes. Daughter notes that pt has been sleeping more lately. Pt consumes alcohol socially. No history of tobacco use. No history of seizures or cardiac issues. Pt denies incontinence, abdominal pain, back pain, chest pain, cough, shortness of breath, fever, headache, ear pain, eye pain, sore throat, or extremity issues. No other associated symptoms.    The history is provided by the patient and a relative.   Review of patient's allergies indicates:  No Known Allergies  No past medical history on file.  No past surgical history on file.  No family history on file.     Review of Systems   Constitutional:  Positive for fatigue. Negative for fever.   HENT:  Negative for ear pain and sore throat.    Eyes:  Negative for pain.   Respiratory:  Negative for cough and shortness of breath.    Cardiovascular:  Negative for chest pain.   Gastrointestinal:  Negative for abdominal pain and nausea.   Genitourinary:  Negative for dysuria.   Musculoskeletal:  Positive for neck pain (posterior). Negative for back pain.   Skin:  Negative for rash.   Neurological:  Positive for dizziness (prior to syncopal episode) and syncope. Negative for weakness and headaches.   Psychiatric/Behavioral:  Negative for confusion.      Physical Exam     Initial Vitals [06/13/23 1104]   BP Pulse Resp Temp SpO2   112/82 83 18 98.1 °F (36.7 °C) 98 %      MAP       --         Physical Exam  The patient was examined specifically for the following:   General:No significant distress, Good color, Warm and dry. Head and neck:Scalp atraumatic, Neck supple. Neurological:Appropriate conversation, Gross motor deficits. Eyes:Conjugate gaze, Clear corneas. ENT: No epistaxis. Cardiac: Regular rate and rhythm, Grossly normal heart tones. Pulmonary: Wheezing, Rales. Gastrointestinal: Abdominal tenderness, Abdominal distention. Musculoskeletal: Extremity deformity, Apparent pain with range of motion of the joints. Skin: Rash.   The findings on  examination were normal.  Lungs are clear.  The heart tones are normal.  The abdomen is soft.  ED Course   Critical Care    Date/Time: 6/13/2023 3:04 PM  Performed by: Hemanth Deutsch MD  Authorized by: Hemanth Deutsch MD   Direct patient critical care time: 22 minutes  Additional history critical care time: 11 minutes  Ordering / reviewing critical care time: 11 minutes  Documentation critical care time: 11 minutes  Consulting other physicians critical care time: 5 minutes  Total critical care time (exclusive of procedural time) : 60 minutes  Critical care time was exclusive of separately billable procedures and treating other patients and teaching time.  Critical care was necessary to treat or prevent imminent or life-threatening deterioration of the following conditions: circulatory failure, dehydration, metabolic crisis and shock.  Critical care was time spent personally by me on the following activities: development of treatment plan with patient or surrogate, discussions with primary provider, interpretation of cardiac output measurements, evaluation of patient's response to treatment, examination of patient, obtaining history from patient or surrogate, ordering and performing treatments and interventions, ordering and review of laboratory studies, ordering and review of radiographic studies, pulse oximetry, re-evaluation of patient's condition and review of old charts.      Labs Reviewed   CBC W/ AUTO DIFFERENTIAL - Abnormal; Notable for the following components:       Result Value    Immature Granulocytes 0.6 (*)     Immature Grans (Abs) 0.07 (*)     All other components within normal limits   COMPREHENSIVE METABOLIC PANEL - Abnormal; Notable for the following components:    Potassium 5.8 (*)     CO2 17 (*)     Glucose 248 (*)     BUN 35 (*)     Creatinine 2.3 (*)     eGFR 31 (*)     All other components within normal limits   TROPONIN I   MAGNESIUM   CK   CK   MAGNESIUM   URINALYSIS, REFLEX TO URINE  CULTURE   POCT GLUCOSE MONITORING CONTINUOUS     EKG Readings: (Independently Interpreted)   This patient is in a normal sinus rhythm with a heart rate of 77.  The patient has an incomplete right bundle branch block.  There is left axis deviation.  There are no significant ST segment or T-wave changes.  There is no evidence of acute myocardial infarction or malignant arrhythmia.      Imaging Results    None       Medical decision making:  This insulin-dependent diabetic works in the heat.  He had an episode of syncope and confusion today.  He is had 2 syncopes in the recent past.  He is discovered to be hypotensive at 70/50 when standing in the emergency room.  I believe that he has a uncontrolled glucose of around 250 I believe this causes an osmotic diuresis.  I believe the rigors of the elements causing dehydration are extreme with the current temperatures.  I believe this is what caused the syncope.  The patient has hyperkalemia and metabolic acidosis.  His glucose is only 258.  I considered diabetic ketoacidosis but there is no elevated anion gap.  Believe this patient will require further rehydration in the emergency room.  He had 4 L in the ER and did not make urine.  His creatinine has risen from 1.3-2.5.  Has an acute kidney injury.  I will admit him for further evaluation and treatment.       Medications   sodium chloride 0.9% bolus 2,000 mL 2,000 mL (2,000 mLs Intravenous Not Given 6/13/23 1300)   sodium chloride 0.9% bolus 2,000 mL 2,000 mL (0 mLs Intravenous Stopped 6/13/23 1345)              Scribe Attestation:   Scribe #1: I performed the above scribed service and the documentation accurately describes the services I performed. I attest to the accuracy of the note.                 I personally performed the services described in this documentation.  All medical record  entries made by the scribe are at my direction and in my presence.  Signed, Dr. Deutsch  Clinical Impression:   Final  diagnoses:  [R55] Syncope  [I95.89, E86.1] Hypotension due to hypovolemia (Primary)  [E11.65] Uncontrolled type 2 diabetes mellitus with hyperglycemia  [T67.5XXA] Heat exhaustion, initial encounter  [E87.5] Hyperkalemia        ED Disposition Condition    Observation Stable                  Hemanth Deutsch MD  06/13/23 1503       Hemanth Deutsch MD  06/13/23 1505       Hemanth Deutsch MD  06/13/23 1530

## 2023-06-14 ENCOUNTER — HOSPITAL ENCOUNTER (OUTPATIENT)
Dept: RADIOLOGY | Facility: HOSPITAL | Age: 68
Discharge: HOME | DRG: 083 | End: 2023-06-14
Payer: OTHER MISCELLANEOUS

## 2023-06-14 PROBLEM — R55 RECURRENT SYNCOPE: Status: ACTIVE | Noted: 2023-06-14

## 2023-06-14 PROBLEM — S06.36AA TRAUMATIC INTRACEREBRAL HEMORRHAGE: Status: ACTIVE | Noted: 2023-06-14

## 2023-06-14 LAB
ALBUMIN SERPL BCP-MCNC: 3.5 G/DL (ref 3.5–5.2)
ALP SERPL-CCNC: 70 U/L (ref 55–135)
ALT SERPL W/O P-5'-P-CCNC: 14 U/L (ref 10–44)
ANION GAP SERPL CALC-SCNC: 9 MMOL/L (ref 8–16)
AORTIC ROOT ANNULUS: 2.48 CM
AORTIC VALVE CUSP SEPERATION: 1.7 CM
AST SERPL-CCNC: 21 U/L (ref 10–40)
AV INDEX (PROSTH): 0.78
AV MEAN GRADIENT: 6 MMHG
AV PEAK GRADIENT: 10 MMHG
AV VALVE AREA: 3.61 CM2
AV VELOCITY RATIO: 0.51
BASOPHILS # BLD AUTO: 0.03 K/UL (ref 0–0.2)
BASOPHILS NFR BLD: 0.3 % (ref 0–1.9)
BILIRUB SERPL-MCNC: 0.6 MG/DL (ref 0.1–1)
BSA FOR ECHO PROCEDURE: 2.14 M2
BUN SERPL-MCNC: 29 MG/DL (ref 8–23)
CALCIUM SERPL-MCNC: 8.8 MG/DL (ref 8.7–10.5)
CHLORIDE SERPL-SCNC: 108 MMOL/L (ref 95–110)
CO2 SERPL-SCNC: 21 MMOL/L (ref 23–29)
CREAT SERPL-MCNC: 1.1 MG/DL (ref 0.5–1.4)
CV ECHO LV RWT: 0.41 CM
DIFFERENTIAL METHOD: ABNORMAL
DOP CALC AO PEAK VEL: 1.62 M/S
DOP CALC AO VTI: 29 CM
DOP CALC LVOT AREA: 4.6 CM2
DOP CALC LVOT DIAMETER: 2.43 CM
DOP CALC LVOT PEAK VEL: 0.83 M/S
DOP CALC LVOT STROKE VOLUME: 104.76 CM3
DOP CALCLVOT PEAK VEL VTI: 22.6 CM
E WAVE DECELERATION TIME: 244.71 MSEC
E/A RATIO: 0.97
E/E' RATIO: 8.48 M/S
ECHO LV POSTERIOR WALL: 0.96 CM (ref 0.6–1.1)
EJECTION FRACTION: 60 %
EOSINOPHIL # BLD AUTO: 0.1 K/UL (ref 0–0.5)
EOSINOPHIL NFR BLD: 0.6 % (ref 0–8)
ERYTHROCYTE [DISTWIDTH] IN BLOOD BY AUTOMATED COUNT: 14.3 % (ref 11.5–14.5)
EST. GFR  (NO RACE VARIABLE): >60 ML/MIN/1.73 M^2
ESTIMATED AVG GLUCOSE: 177 MG/DL (ref 68–131)
FRACTIONAL SHORTENING: 39 % (ref 28–44)
GLUCOSE SERPL-MCNC: 200 MG/DL (ref 70–110)
HBA1C MFR BLD: 7.8 % (ref 4–5.6)
HCT VFR BLD AUTO: 43 % (ref 40–54)
HGB BLD-MCNC: 14 G/DL (ref 14–18)
IMM GRANULOCYTES # BLD AUTO: 0.03 K/UL (ref 0–0.04)
IMM GRANULOCYTES NFR BLD AUTO: 0.3 % (ref 0–0.5)
INTERVENTRICULAR SEPTUM: 0.93 CM (ref 0.6–1.1)
LA MAJOR: 5 CM
LA MINOR: 4.05 CM
LA WIDTH: 4 CM
LEFT ATRIUM SIZE: 4.02 CM
LEFT ATRIUM VOLUME INDEX: 29 ML/M2
LEFT ATRIUM VOLUME: 61.17 CM3
LEFT INTERNAL DIMENSION IN SYSTOLE: 2.87 CM (ref 2.1–4)
LEFT VENTRICLE DIASTOLIC VOLUME INDEX: 49.25 ML/M2
LEFT VENTRICLE DIASTOLIC VOLUME: 103.91 ML
LEFT VENTRICLE MASS INDEX: 73 G/M2
LEFT VENTRICLE SYSTOLIC VOLUME INDEX: 14.9 ML/M2
LEFT VENTRICLE SYSTOLIC VOLUME: 31.43 ML
LEFT VENTRICULAR INTERNAL DIMENSION IN DIASTOLE: 4.73 CM (ref 3.5–6)
LEFT VENTRICULAR MASS: 153.93 G
LV LATERAL E/E' RATIO: 7.42 M/S
LV SEPTAL E/E' RATIO: 9.89 M/S
LVOT MG: 1.64 MMHG
LVOT MV: 0.62 CM/S
LYMPHOCYTES # BLD AUTO: 2.1 K/UL (ref 1–4.8)
LYMPHOCYTES NFR BLD: 20.3 % (ref 18–48)
MCH RBC QN AUTO: 30.5 PG (ref 27–31)
MCHC RBC AUTO-ENTMCNC: 32.6 G/DL (ref 32–36)
MCV RBC AUTO: 94 FL (ref 82–98)
MONOCYTES # BLD AUTO: 0.9 K/UL (ref 0.3–1)
MONOCYTES NFR BLD: 8.4 % (ref 4–15)
MV PEAK A VEL: 0.92 M/S
MV PEAK E VEL: 0.89 M/S
MV STENOSIS PRESSURE HALF TIME: 70.97 MS
MV VALVE AREA P 1/2 METHOD: 3.1 CM2
NEUTROPHILS # BLD AUTO: 7.1 K/UL (ref 1.8–7.7)
NEUTROPHILS NFR BLD: 70.1 % (ref 38–73)
NRBC BLD-RTO: 0 /100 WBC
PLATELET # BLD AUTO: 225 K/UL (ref 150–450)
PMV BLD AUTO: 8.9 FL (ref 9.2–12.9)
POCT GLUCOSE: 171 MG/DL (ref 70–110)
POCT GLUCOSE: 185 MG/DL (ref 70–110)
POCT GLUCOSE: 195 MG/DL (ref 70–110)
POCT GLUCOSE: 197 MG/DL (ref 70–110)
POTASSIUM SERPL-SCNC: 4.9 MMOL/L (ref 3.5–5.1)
PROT SERPL-MCNC: 7.1 G/DL (ref 6–8.4)
RA MAJOR: 4.66 CM
RA PRESSURE: 3 MMHG
RA WIDTH: 4.79 CM
RBC # BLD AUTO: 4.59 M/UL (ref 4.6–6.2)
RIGHT VENTRICULAR END-DIASTOLIC DIMENSION: 3.99 CM
SINUS: 2.91 CM
SODIUM SERPL-SCNC: 138 MMOL/L (ref 136–145)
STJ: 2.81 CM
TDI LATERAL: 0.12 M/S
TDI SEPTAL: 0.09 M/S
TDI: 0.11 M/S
TRICUSPID ANNULAR PLANE SYSTOLIC EXCURSION: 2.24 CM
WBC # BLD AUTO: 10.09 K/UL (ref 3.9–12.7)

## 2023-06-14 PROCEDURE — 97161 PT EVAL LOW COMPLEX 20 MIN: CPT

## 2023-06-14 PROCEDURE — 25000003 PHARM REV CODE 250: Performed by: HOSPITALIST

## 2023-06-14 PROCEDURE — 72100 XR LUMBAR SPINE AP AND LATERAL: ICD-10-PCS | Mod: 26,,, | Performed by: INTERNAL MEDICINE

## 2023-06-14 PROCEDURE — 63600175 PHARM REV CODE 636 W HCPCS: Performed by: HOSPITALIST

## 2023-06-14 PROCEDURE — 96361 HYDRATE IV INFUSION ADD-ON: CPT

## 2023-06-14 PROCEDURE — 36415 COLL VENOUS BLD VENIPUNCTURE: CPT

## 2023-06-14 PROCEDURE — 72040 XR CERVICAL SPINE AP LATERAL: ICD-10-PCS | Mod: 26,,, | Performed by: INTERNAL MEDICINE

## 2023-06-14 PROCEDURE — 97165 OT EVAL LOW COMPLEX 30 MIN: CPT

## 2023-06-14 PROCEDURE — G0378 HOSPITAL OBSERVATION PER HR: HCPCS

## 2023-06-14 PROCEDURE — 25000003 PHARM REV CODE 250

## 2023-06-14 PROCEDURE — 72100 X-RAY EXAM L-S SPINE 2/3 VWS: CPT | Mod: 26,,, | Performed by: INTERNAL MEDICINE

## 2023-06-14 PROCEDURE — 21400001 HC TELEMETRY ROOM

## 2023-06-14 PROCEDURE — 80053 COMPREHEN METABOLIC PANEL: CPT | Performed by: HOSPITALIST

## 2023-06-14 PROCEDURE — 72100 X-RAY EXAM L-S SPINE 2/3 VWS: CPT | Mod: TC,FY

## 2023-06-14 PROCEDURE — 96372 THER/PROPH/DIAG INJ SC/IM: CPT | Performed by: HOSPITALIST

## 2023-06-14 PROCEDURE — 72040 X-RAY EXAM NECK SPINE 2-3 VW: CPT | Mod: 26,,, | Performed by: INTERNAL MEDICINE

## 2023-06-14 PROCEDURE — 36415 COLL VENOUS BLD VENIPUNCTURE: CPT | Performed by: STUDENT IN AN ORGANIZED HEALTH CARE EDUCATION/TRAINING PROGRAM

## 2023-06-14 PROCEDURE — 72040 X-RAY EXAM NECK SPINE 2-3 VW: CPT | Mod: TC,FY

## 2023-06-14 PROCEDURE — 85025 COMPLETE CBC W/AUTO DIFF WBC: CPT

## 2023-06-14 PROCEDURE — 36415 COLL VENOUS BLD VENIPUNCTURE: CPT | Performed by: HOSPITALIST

## 2023-06-14 RX ORDER — OXYCODONE AND ACETAMINOPHEN 5; 325 MG/1; MG/1
1 TABLET ORAL EVERY 4 HOURS PRN
Status: DISCONTINUED | OUTPATIENT
Start: 2023-06-14 | End: 2023-06-15 | Stop reason: HOSPADM

## 2023-06-14 RX ORDER — TIZANIDINE 4 MG/1
4 TABLET ORAL EVERY 8 HOURS
Status: DISCONTINUED | OUTPATIENT
Start: 2023-06-14 | End: 2023-06-15 | Stop reason: HOSPADM

## 2023-06-14 RX ORDER — LIDOCAINE 50 MG/G
1 PATCH TOPICAL
Status: DISCONTINUED | OUTPATIENT
Start: 2023-06-14 | End: 2023-06-15 | Stop reason: HOSPADM

## 2023-06-14 RX ORDER — AMLODIPINE BESYLATE 5 MG/1
10 TABLET ORAL DAILY
Status: DISCONTINUED | OUTPATIENT
Start: 2023-06-14 | End: 2023-06-15 | Stop reason: HOSPADM

## 2023-06-14 RX ADMIN — ACETAMINOPHEN 650 MG: 325 TABLET ORAL at 06:06

## 2023-06-14 RX ADMIN — OXYCODONE AND ACETAMINOPHEN 1 TABLET: 5; 325 TABLET ORAL at 09:06

## 2023-06-14 RX ADMIN — SODIUM CHLORIDE: 9 INJECTION, SOLUTION INTRAVENOUS at 11:06

## 2023-06-14 RX ADMIN — GABAPENTIN 300 MG: 300 CAPSULE ORAL at 02:06

## 2023-06-14 RX ADMIN — INSULIN ASPART 2 UNITS: 100 INJECTION, SOLUTION INTRAVENOUS; SUBCUTANEOUS at 04:06

## 2023-06-14 RX ADMIN — AMLODIPINE BESYLATE 10 MG: 5 TABLET ORAL at 10:06

## 2023-06-14 RX ADMIN — TIZANIDINE 4 MG: 4 TABLET ORAL at 06:06

## 2023-06-14 RX ADMIN — TIZANIDINE 4 MG: 4 TABLET ORAL at 09:06

## 2023-06-14 RX ADMIN — INSULIN ASPART 2 UNITS: 100 INJECTION, SOLUTION INTRAVENOUS; SUBCUTANEOUS at 11:06

## 2023-06-14 RX ADMIN — SODIUM CHLORIDE: 9 INJECTION, SOLUTION INTRAVENOUS at 04:06

## 2023-06-14 RX ADMIN — GABAPENTIN 300 MG: 300 CAPSULE ORAL at 09:06

## 2023-06-14 RX ADMIN — TIZANIDINE 4 MG: 4 TABLET ORAL at 11:06

## 2023-06-14 RX ADMIN — LIDOCAINE PATCH 5% 1 PATCH: 700 PATCH TOPICAL at 10:06

## 2023-06-14 RX ADMIN — PRAVASTATIN SODIUM 20 MG: 10 TABLET ORAL at 04:06

## 2023-06-14 RX ADMIN — OXYCODONE AND ACETAMINOPHEN 1 TABLET: 5; 325 TABLET ORAL at 05:06

## 2023-06-14 RX ADMIN — LEVOTHYROXINE SODIUM 50 MCG: 50 TABLET ORAL at 06:06

## 2023-06-14 RX ADMIN — GABAPENTIN 300 MG: 300 CAPSULE ORAL at 10:06

## 2023-06-14 NOTE — SUBJECTIVE & OBJECTIVE
Past Medical History:   Diagnosis Date    Diabetes mellitus     Hypertension        No past surgical history on file.    Review of patient's allergies indicates:  No Known Allergies    No current facility-administered medications on file prior to encounter.     Current Outpatient Medications on File Prior to Encounter   Medication Sig    levothyroxine (SYNTHROID) 50 MCG tablet Take 50 mcg by mouth Daily.    metFORMIN (GLUCOPHAGE) 1000 MG tablet Take 1,000 mg by mouth 2 (two) times a day.    gabapentin (NEURONTIN) 300 MG capsule Take 1 capsule by mouth 3 (three) times daily.    insulin (LANTUS SOLOSTAR U-100 INSULIN) glargine 100 units/mL SubQ pen Inject 20 Units into the skin nightly.    lisinopriL (PRINIVIL,ZESTRIL) 40 MG tablet Take 40 mg by mouth Daily.    meclizine (ANTIVERT) 25 mg tablet Take 1 tablet (25 mg total) by mouth 3 (three) times daily as needed for Dizziness.    NOVOLIN 70/30 U-100 INSULIN 100 unit/mL (70-30) injection Inject 15 Units into the skin 2 (two) times a day.    pravastatin (PRAVACHOL) 20 MG tablet Take 20 mg by mouth Daily.     Family History    None       Tobacco Use    Smoking status: Never    Smokeless tobacco: Never   Substance and Sexual Activity    Alcohol use: Yes     Comment: 06/13/23: occ    Drug use: Not Currently    Sexual activity: Not on file     Review of Systems   Constitutional:  Negative for chills and fever.   HENT:  Negative for congestion and rhinorrhea.    Eyes:  Negative for photophobia and visual disturbance.   Respiratory:  Negative for cough and shortness of breath.    Cardiovascular:  Negative for chest pain, palpitations and leg swelling.   Gastrointestinal:  Negative for abdominal pain, diarrhea, nausea and vomiting.   Genitourinary:  Negative for frequency, hematuria and urgency.   Skin:  Negative for pallor, rash and wound.   Neurological:  Positive for dizziness, syncope and light-headedness. Negative for headaches.   Psychiatric/Behavioral:  Negative for  confusion and decreased concentration.    Objective:     Vital Signs (Most Recent):  Temp: 98.1 °F (36.7 °C) (06/13/23 1104)  Pulse: 75 (06/13/23 1434)  Resp: 17 (06/13/23 1559)  BP: (!) 141/69 (06/13/23 1434)  SpO2: 98 % (06/13/23 1434) Vital Signs (24h Range):  Temp:  [98.1 °F (36.7 °C)] 98.1 °F (36.7 °C)  Pulse:  [71-90] 75  Resp:  [17-31] 17  SpO2:  [90 %-98 %] 98 %  BP: ()/(49-82) 141/69     Weight: 91.2 kg (201 lb)  Body mass index is 28.03 kg/m².     Physical Exam  Vitals and nursing note reviewed.   Constitutional:       General: He is not in acute distress.     Appearance: He is well-developed.   HENT:      Head: Normocephalic and atraumatic.      Right Ear: External ear normal.      Left Ear: External ear normal.      Nose: Nose normal.   Eyes:      Conjunctiva/sclera: Conjunctivae normal.   Cardiovascular:      Rate and Rhythm: Normal rate and regular rhythm.   Pulmonary:      Effort: Pulmonary effort is normal. No respiratory distress.   Abdominal:      General: There is no distension.      Palpations: Abdomen is soft.   Musculoskeletal:         General: Normal range of motion.   Skin:     General: Skin is warm and dry.   Neurological:      Mental Status: He is alert and oriented to person, place, and time.   Psychiatric:         Thought Content: Thought content normal.              Significant Labs: All pertinent labs within the past 24 hours have been reviewed.    Significant Imaging: I have reviewed all pertinent imaging results/findings within the past 24 hours.

## 2023-06-14 NOTE — ASSESSMENT & PLAN NOTE
Patient's FSGs are controlled on current medication regimen.  Last A1c reviewed- No results found for: LABA1C, HGBA1C  Most recent fingerstick glucose reviewed- No results for input(s): POCTGLUCOSE in the last 24 hours.  Current correctional scale  Medium  Maintain anti-hyperglycemic dose as follows-   Antihyperglycemics (From admission, onward)    Start     Stop Route Frequency Ordered    06/13/23 1828  insulin aspart U-100 pen 1-10 Units         -- SubQ Before meals & nightly PRN 06/13/23 1729        Hold Oral hypoglycemics while patient is in the hospital.

## 2023-06-14 NOTE — PT/OT/SLP EVAL
"Occupational Therapy   Evaluation    Name: Carlin Madrigal  MRN: [id number]  Admitting Diagnosis: Traumatic intracerebral hemorrhage  Recent Surgery: * No surgery found *      Recommendations:     Discharge Recommendations:  (TBD pending ongoing assessment; per chart, t/f to [facility]?)  Discharge Equipment Recommendations:   (TBD pending ongoing assessment.)  Barriers to discharge:   (Pt w/ severe back pain, limiting safe functioanl mobility and ADL performance.)    Assessment:     Carlin Madrigal is a 68 y.o. male with a medical diagnosis of Traumatic intracerebral hemorrhage.  Performance deficits affecting function: weakness, impaired endurance, impaired functional mobility, impaired self care skills, gait instability, impaired balance, pain, decreased ROM, decreased lower extremity function, decreased safety awareness.      Pt significantly limited by onset of back pain w/ mobility but was able to ambulate a short distance of ~20 ft w/ close CGA-HHA for transferring to transport chair in preparation for NELL testing. Pt appeared SOB but w/ SPO2 97% on RA. Pt will continue to benefit from skilled acute OT services to maximize functional capacity for safe performance w/ ADLs and functional mobility. Further assessment needed for determining recommendations.     Rehab Prognosis: Fair; patient would benefit from acute skilled OT services to address these deficits and reach maximum level of function.       Plan:     Patient to be seen 5 x/week to address the above listed problems via self-care/home management, therapeutic activities, therapeutic exercises  Plan of Care Expires: 06/28/23  Plan of Care Reviewed with: patient, spouse    Subjective     Chief Complaint: "It hurts when I move."   Patient/Family Comments/goals: Agreeable to ambulate to transport's W/C.     Occupational Profile:  Living Environment: Pt lives w/ spouse in [facility] w/ 0 [address].   Previous level of function: Pt was independent w/ all ADLs and functio mobility., "   Roles and Routines: Working in construction.   Equipment Used at Home: none  Assistance upon Discharge: Wife    Pain/Comfort:  Pain Rating 1: 8/10  Location 1: back  Pain Addressed 1: Reposition, Distraction, Cessation of Activity, Pre-medicate for activity    Patients cultural, spiritual, Gnosticist conflicts given the current situation: no    Objective:     Communicated with: Nurse prior to session.  Patient found HOB elevated with telemetry upon OT entry to room.    General Precautions: Standard,    Orthopedic Precautions:  N/A  Braces: N/A  Respiratory Status: Room air    Occupational Performance:    Bed Mobility:    Patient completed Scooting/Bridging with stand by assistance  Patient completed Supine to Sit with stand by assistance    Functional Mobility/Transfers:  Patient completed Sit <> Stand Transfer with contact guard assistance  with  no assistive device   Patient completed Bed <> Chair Transfer using Step Transfer technique with contact guard assistance with no assistive device  Functional Mobility: Pt ambulated from room to W/C in hallway w/ close CGA and no AD; pt holding back an complaining of pain.     Activities of Daily Living:  Upper Body Dressing: minimum assistance for donning gown over back     Cognitive/Visual Perceptual:  Cognitive/Psychosocial Skills:     -       Oriented to: Person, Place, Time, and Situation   -       Follows Commands/attention:Follows multistep  commands  -       Communication: clear/fluent  -       Memory: No Deficits noted  -       Safety awareness/insight to disability: intact     Physical Exam:  Balance:    -       good sitting balance; fair standing   Postural examination/scapula alignment:    -       Rounded shoulders  -       Forward head  Skin integrity: Visible skin intact  Edema:  None noted  Sensation:    -       Intact  Upper Extremity Range of Motion:     -       Right Upper Extremity: WFL  -       Left Upper Extremity: WFL  Upper Extremity Strength:    -        Right Upper Extremity: WFL  -       Left Upper Extremity: WFL   Strength:    -       Right Upper Extremity: WFL  -       Left Upper Extremity: WFL    AMPAC 6 Click ADL:  AMPAC Total Score: 20    Treatment & Education:  -Initial eval complete.     Patient left up in chair with all lines intact, call button in reach, and transport present    GOALS:   Multidisciplinary Problems       Occupational Therapy Goals          Problem: Occupational Therapy    Goal Priority Disciplines Outcome Interventions   Occupational Therapy Goal     OT, PT/OT Ongoing, Progressing    Description: Goals to be met by: 6/14/23     Patient will increase functional independence with ADLs by performing:    LE Dressing with Modified [place].  Grooming while standing at sink with Modified [place].  Toileting from toilet with Modified [place] for hygiene and clothing management.   Step transfer with Modified [place]  Toilet transfer to toilet with Modified [place].  Upper extremity exercise program x15 reps per handout, with independence.                         History:     Past Medical History:   Diagnosis Date    Diabetes mellitus     Hypertension        No past surgical history on file.    Time Tracking:     OT Date of Treatment: 06/14/23  OT Start Time: 1205  OT Stop Time: 1213  OT Total Time (min): 8 min    Billable Minutes:Evaluation 8  Total Time 8 (co-eval w/ PT)     6/14/2023

## 2023-06-14 NOTE — CARE UPDATE
Per recommendations of [facility], transfer was initiated to [facility].  Case was discussed with Dr. Lundberg (Neurosurgery on-call), who recommended repeat CT head and if worsening to contact her and have the patient transferred.  Repeat CT head obtained at  1645 noted no significant change from earlier study... recommend surveillance/follow-up   Patient continues to endorse severe back pain, and PRN analgesics have been ordered. Dr. Lundberg was notified for CT findings via secure chat at 1707, for her input. CT Lumbar Spine obtained, and is unremarkable for any acute abnormalities.  Echocardiogram noted normal LV RV size and function with EF of 60% with no evidence of intracardiac shunting.    Patient would benefit from further monitoring overnight to ensure no further decline.    Yuriy Stephens PA-C  Department of Hospital Medicine  [facility]  06/14/2023

## 2023-06-14 NOTE — HPI
68 y.o. male with HTN, HLD, DM2, and hypothyroidism presents with a complaint of syncope.  Acute onset earlier today at work.  He works out in the heat and coworkers reported that he fell twice.  Reportedly confused and diaphoretic on scene.  Reported prodrome of dizziness.  Similar occurrence while working in the heat 2 weeks ago.  Relative reports he has been drinking a lot of cola and beer lately.  Denies fever, chills, cough, SOB, chest pain, palpitations, nausea, vomiting, diarrhea, or abdominal pain.  In the ED, labs reveal ABDIRIZAK, metabolic acidosis, and hyperkalemia.  IV fluids initiated.  Placed in observation.

## 2023-06-14 NOTE — ASSESSMENT & PLAN NOTE
"Patient presented to the ED s/p witnessed syncopal episode at work; similar event occurred 2 weeks ago as well.  CT Head shows: "Inferior left frontal contusion with associated small foci of parenchymal and subarachnoid hemorrhage. Some minimal additional subarachnoid hemorrhage is present along the right posterior frontoparietal region."  Case was discussed with general neurology, who recommended neurosurgery consult; appreciate recommendations.  Patient complaining of significant back pain s/p fall and pain while walking. PT/OT consulted.  PRN analgesics ordered.  "

## 2023-06-14 NOTE — ASSESSMENT & PLAN NOTE
Patient with acute kidney injury likely due to IVVD/dehydration ABDIRIZAK is currently worsening. Labs reviewed- Renal function/electrolytes with Estimated Creatinine Clearance: 36.5 mL/min (A) (based on SCr of 2.3 mg/dL (H)). according to latest data. Monitor urine output and serial BMP and adjust therapy as needed. Avoid nephrotoxins and renally dose meds for GFR listed above.

## 2023-06-14 NOTE — H&P
[facility] Medicine  History & Physical    Patient Name: Carlin Madrigal  MRN: [id number]  Patient Class: OP- Observation  Admission Date: 6/13/2023  Attending Physician: Chriss Perez MD   Primary Care Provider: Primary Doctor No         Patient information was obtained from patient, past medical records and ER records.     Subjective:     Principal Problem:ABDIRIZAK (acute kidney injury)    Chief Complaint:   Chief Complaint   Patient presents with    Loss of Consciousness     Pt BIB EMS after having witnessed syncopal episode at job site. Per coworkers, pt fell twice. Pt was initially confused and diaphoretic per EMS. Blood pressure for EMS was 110/60. Denies any pain.        HPI: 68 y.o. male with HTN, HLD, DM2, and hypothyroidism presents with a complaint of syncope.  Acute onset earlier today at work.  He works out in the heat and coworkers reported that he fell twice.  Reportedly confused and diaphoretic on scene.  Reported prodrome of dizziness.  Similar occurrence while working in the heat 2 weeks ago.  Relative reports he has been drinking a lot of cola and beer lately.  Denies fever, chills, cough, SOB, chest pain, palpitations, nausea, vomiting, diarrhea, or abdominal pain.  In the ED, labs reveal ABDIRIZAK, metabolic acidosis, and hyperkalemia.  IV fluids initiated.  Placed in observation.      Past Medical History:   Diagnosis Date    Diabetes mellitus     Hypertension        No past surgical history on file.    Review of patient's allergies indicates:  No Known Allergies    No current facility-administered medications on file prior to encounter.     Current Outpatient Medications on File Prior to Encounter   Medication Sig    levothyroxine (SYNTHROID) 50 MCG tablet Take 50 mcg by mouth Daily.    metFORMIN (GLUCOPHAGE) 1000 MG tablet Take 1,000 mg by mouth 2 (two) times a day.    gabapentin (NEURONTIN) 300 MG capsule Take 1 capsule by mouth 3 (three) times daily.    insulin (LANTUS SOLOSTAR  U-100 INSULIN) glargine 100 units/mL SubQ pen Inject 20 Units into the skin nightly.    lisinopriL (PRINIVIL,ZESTRIL) 40 MG tablet Take 40 mg by mouth Daily.    meclizine (ANTIVERT) 25 mg tablet Take 1 tablet (25 mg total) by mouth 3 (three) times daily as needed for Dizziness.    NOVOLIN 70/30 U-100 INSULIN 100 unit/mL (70-30) injection Inject 15 Units into the skin 2 (two) times a day.    pravastatin (PRAVACHOL) 20 MG tablet Take 20 mg by mouth Daily.     Family History    None       Tobacco Use    Smoking status: Never    Smokeless tobacco: Never   Substance and Sexual Activity    Alcohol use: Yes     Comment: 06/13/23: occ    Drug use: Not Currently    Sexual activity: Not on file     Review of Systems   Constitutional:  Negative for chills and fever.   HENT:  Negative for congestion and rhinorrhea.    Eyes:  Negative for photophobia and visual disturbance.   Respiratory:  Negative for cough and shortness of breath.    Cardiovascular:  Negative for chest pain, palpitations and leg swelling.   Gastrointestinal:  Negative for abdominal pain, diarrhea, nausea and vomiting.   Genitourinary:  Negative for frequency, hematuria and urgency.   Skin:  Negative for pallor, rash and wound.   Neurological:  Positive for dizziness, syncope and light-headedness. Negative for headaches.   Psychiatric/Behavioral:  Negative for confusion and decreased concentration.    Objective:     Vital Signs (Most Recent):  Temp: 98.1 °F (36.7 °C) (06/13/23 1104)  Pulse: 75 (06/13/23 1434)  Resp: 17 (06/13/23 1559)  BP: (!) 141/69 (06/13/23 1434)  SpO2: 98 % (06/13/23 1434) Vital Signs (24h Range):  Temp:  [98.1 °F (36.7 °C)] 98.1 °F (36.7 °C)  Pulse:  [71-90] 75  Resp:  [17-31] 17  SpO2:  [90 %-98 %] 98 %  BP: ()/(49-82) 141/69     Weight: 91.2 kg (201 lb)  Body mass index is 28.03 kg/m².     Physical Exam  Vitals and nursing note reviewed.   Constitutional:       General: He is not in acute distress.     Appearance: He is  well-developed.   HENT:      Head: Normocephalic and atraumatic.      Right Ear: External ear normal.      Left Ear: External ear normal.      Nose: Nose normal.   Eyes:      Conjunctiva/sclera: Conjunctivae normal.   Cardiovascular:      Rate and Rhythm: Normal rate and regular rhythm.   Pulmonary:      Effort: Pulmonary effort is normal. No respiratory distress.   Abdominal:      General: There is no distension.      Palpations: Abdomen is soft.   Musculoskeletal:         General: Normal range of motion.   Skin:     General: Skin is warm and dry.   Neurological:      Mental Status: He is alert and oriented to person, place, and time.   Psychiatric:         Thought Content: Thought content normal.              Significant Labs: All pertinent labs within the past 24 hours have been reviewed.    Significant Imaging: I have reviewed all pertinent imaging results/findings within the past 24 hours.    Assessment/Plan:     * ABDIRIZAK (acute kidney injury)  Patient with acute kidney injury likely due to IVVD/dehydration ABDIRIZAK is currently worsening. Labs reviewed- Renal function/electrolytes with Estimated Creatinine Clearance: 36.5 mL/min (A) (based on SCr of 2.3 mg/dL (H)). according to latest data. Monitor urine output and serial BMP and adjust therapy as needed. Avoid nephrotoxins and renally dose meds for GFR listed above.     Hypothyroidism  Continue home regimen of synthroid    Hyperlipidemia  Continue statin    Diabetes mellitus  Patient's FSGs are controlled on current medication regimen.  Last A1c reviewed- No results found for: LABA1C, HGBA1C  Most recent fingerstick glucose reviewed- No results for input(s): POCTGLUCOSE in the last 24 hours.  Current correctional scale  Medium  Maintain anti-hyperglycemic dose as follows-   Antihyperglycemics (From admission, onward)    Start     Stop Route Frequency Ordered    06/13/23 1828  insulin aspart U-100 pen 1-10 Units         -- SubQ Before meals & nightly PRN 06/13/23 9202         Hold Oral hypoglycemics while patient is in the hospital.    Hypertension  Well controlled, hold ACEi due to renal function, monitor blood pressure, adjust as needed.       VTE Risk Mitigation (From admission, onward)         Ordered     IP VTE LOW RISK PATIENT  Once         06/13/23 1729     Place sequential compression device  Until discontinued         06/13/23 1729                     On 06/13/2023, patient should be placed in hospital observation services under my care in collaboration with Chriss Perez MD.    Donny Vergara Jr., APRN, AGACN-BC  Hospitalist - Department of Hospital Medicine  [facility] [address] [place] [address]  Office #: [phone]; Pager #: [phone]

## 2023-06-14 NOTE — NURSING
Nurses Note -- 4 Eyes      6/14/2023   1:07 AM      Skin assessed during: Q Shift Change      [x] No Altered Skin Integrity Present    [x]Prevention Measures Documented      [] Yes- Altered Skin Integrity Present or Discovered   [] LDA Added if Not in Epic (Describe Wound)   [] New Altered Skin Integrity was Present on Admit and Documented in LDA   [] Wound Image Taken    Wound Care Consulted? No    Attending Nurse:  Lexi Amezcua RN     Second RN/Staff Member:  Marli ALDANA

## 2023-06-14 NOTE — SUBJECTIVE & OBJECTIVE
Interval History:       Review of Systems   Constitutional:  Positive for activity change. Negative for chills, fatigue and fever.   HENT:  Negative for congestion, facial swelling, hearing loss, sinus pressure, sinus pain, sneezing, sore throat and trouble swallowing.    Eyes:  Negative for pain, discharge, redness and visual disturbance.   Respiratory:  Negative for choking, chest tightness, shortness of breath and wheezing.    Cardiovascular:  Negative for chest pain, palpitations and leg swelling.   Gastrointestinal:  Negative for abdominal pain, constipation, diarrhea, nausea and vomiting.   Genitourinary:  Negative for difficulty urinating, dysuria, hematuria and urgency.   Musculoskeletal:  Positive for arthralgias, back pain and gait problem. Negative for neck pain and neck stiffness.   Neurological:  Positive for syncope and light-headedness. Negative for dizziness, speech difficulty, weakness and numbness.   Psychiatric/Behavioral:  Negative for agitation and behavioral problems.    Objective:     Vital Signs (Most Recent):  Temp: 98.6 °F (37 °C) (06/14/23 1117)  Pulse: 85 (06/14/23 1117)  Resp: 19 (06/14/23 1117)  BP: (!) 168/81 (06/14/23 1117)  SpO2: (!) 93 % (06/14/23 1117) Vital Signs (24h Range):  Temp:  [98.1 °F (36.7 °C)-98.6 °F (37 °C)] 98.6 °F (37 °C)  Pulse:  [71-90] 85  Resp:  [17-31] 19  SpO2:  [90 %-98 %] 93 %  BP: ()/(49-81) 168/81     Weight: 91.2 kg (201 lb)  Body mass index is 28.03 kg/m².  No intake or output data in the 24 hours ending 06/14/23 1200      Physical Exam  Vitals reviewed.   Constitutional:       General: He is not in acute distress.     Appearance: Normal appearance. He is not ill-appearing.   HENT:      Head: Normocephalic. Contusion present.      Comments: Pain upon palpation.     Right Ear: External ear normal.      Left Ear: External ear normal.      Nose: Nose normal.      Mouth/Throat:      Mouth: Mucous membranes are moist.      Pharynx: Oropharynx is clear.    Eyes:      General:         Right eye: No discharge.         Left eye: No discharge.      Extraocular Movements: Extraocular movements intact.      Pupils: Pupils are equal, round, and reactive to light.   Cardiovascular:      Rate and Rhythm: Normal rate and regular rhythm.      Pulses: Normal pulses.      Heart sounds: Normal heart sounds. No murmur heard.    No friction rub. No gallop.      Comments: Admission EKG reviewed and noted to show: NSR at 77 bpm.  Pulmonary:      Effort: Pulmonary effort is normal. No respiratory distress.      Breath sounds: Normal breath sounds. No wheezing.   Abdominal:      General: Bowel sounds are normal. There is no distension.      Palpations: Abdomen is soft.   Musculoskeletal:      Cervical back: Decreased range of motion.      Lumbar back: Decreased range of motion.      Right lower leg: No edema.      Left lower leg: No edema.   Skin:     General: Skin is warm.   Neurological:      Mental Status: He is alert and oriented to person, place, and time. Mental status is at baseline.           Significant Labs: All pertinent labs within the past 24 hours have been reviewed.  Bilirubin:   Recent Labs   Lab 05/25/23 2026 06/13/23  1129 06/14/23  0441   BILITOT 0.2 0.4 0.6     BMP:   Recent Labs   Lab 06/13/23  1129 06/14/23  0441   * 200*    138   K 5.8* 4.9    108   CO2 17* 21*   BUN 35* 29*   CREATININE 2.3* 1.1   CALCIUM 10.2 8.8   MG 1.7  --      CBC:   Recent Labs   Lab 06/13/23  1129   WBC 11.07   HGB 15.2   HCT 46.5        CMP:   Recent Labs   Lab 06/13/23  1129 06/14/23  0441    138   K 5.8* 4.9    108   CO2 17* 21*   * 200*   BUN 35* 29*   CREATININE 2.3* 1.1   CALCIUM 10.2 8.8   PROT 8.1 7.1   ALBUMIN 4.1 3.5   BILITOT 0.4 0.6   ALKPHOS 82 70   AST 20 21   ALT 16 14   ANIONGAP 14 9     Magnesium:   Recent Labs   Lab 06/13/23  1129   MG 1.7     POCT Glucose:   Recent Labs   Lab 06/13/23  1938 06/14/23  0728 06/14/23  1116    POCTGLUCOSE 159* 185* 197*     Troponin:   Recent Labs   Lab 06/13/23  1129   TROPONINI 0.017     Urine Studies:   Recent Labs   Lab 06/13/23  1512   COLORU Yellow   APPEARANCEUA Hazy*   PHUR 5.0   SPECGRAV 1.025   PROTEINUA Trace*   GLUCUA 1+*   KETONESU Negative   BILIRUBINUA Negative   OCCULTUA Negative   NITRITE Negative   UROBILINOGEN Negative   LEUKOCYTESUR Negative       Significant Imaging: I have reviewed all pertinent imaging results/findings within the past 24 hours.

## 2023-06-14 NOTE — NURSING
[facility] - [place], [place]  Nurses Note -- 4 Eyes      6/14/2023       Skin assessed on: Q Shift      [x] No Pressure Injuries Present    [x]Prevention Measures Documented    [] Yes LDA  for Pressure Injury Previously documented     [] Yes New Pressure Injury Discovered   [] LDA for New Pressure Injury Added      Attending RN:  Monique Lopez RN     Second RN: Lexi Amezcua RN

## 2023-06-14 NOTE — PLAN OF CARE
Problem: Occupational Therapy  Goal: Occupational Therapy Goal  Description: Goals to be met by: 6/14/23     Patient will increase functional independence with ADLs by performing:    LE Dressing with Modified [place].  Grooming while standing at sink with Modified [place].  Toileting from toilet with Modified [place] for hygiene and clothing management.   Step transfer with Modified [place]  Toilet transfer to toilet with Modified [place].  Upper extremity exercise program x15 reps per handout, with independence.    6/14/2023 1527 by Rosy Miller OT    Pt significantly limited by onset of back pain w/ mobility but was able to ambulate a short distance of ~20 ft w/ close CGA-HHA for transferring to transport chair in preparation for NELL testing. Pt appeared SOB but w/ SPO2 97% on RA. Pt will continue to benefit from skilled acute OT services to maximize functional capacity for safe performance w/ ADLs and functional mobility. Further assessment needed for determining recommendations.

## 2023-06-14 NOTE — PROGRESS NOTES
[facility] Medicine  Progress Note    Patient Name: Carlin Madrigal  MRN: [id number]  Patient Class: OP- Observation   Admission Date: 6/13/2023  Length of Stay: 0 days  Attending Physician: Chriss Perez MD  Primary Care Provider: Primary Doctor No        Subjective:     Principal Problem:Traumatic intracerebral hemorrhage        HPI:  68 y.o. male with HTN, HLD, DM2, and hypothyroidism presents with a complaint of syncope.  Acute onset earlier today at work.  He works out in the heat and coworkers reported that he fell twice.  Reportedly confused and diaphoretic on scene.  Reported prodrome of dizziness.  Similar occurrence while working in the heat 2 weeks ago.  Relative reports he has been drinking a lot of cola and beer lately.  Denies fever, chills, cough, SOB, chest pain, palpitations, nausea, vomiting, diarrhea, or abdominal pain.  In the ED, labs reveal ABDIRIZAK, metabolic acidosis, and hyperkalemia.  IV fluids initiated.  Placed in observation.      Overview/Hospital Course:  Carlin Madrigal was placed under observation for management of ABDIRIZAK.    Throuhgout his observation stay, Mr. Madrigal was started on IV fluids, which allowed for resolution of his ABDIRIZAK noted on admission labs. Due to recurrent falls at work, CT Head was ordered which noted left frontal contusion with small foci of subarachnoid and parenchymal hemorrhage and minimal left subarachnoid help. General Neurology was consulted and recommended further neurosurgery evaluation of traumatic ICH. Patient continues to endorse severe back pain along with head pain, and PT/OT was consulted. Awaiting Neurosurgery and PT/OT consultation.      Interval History:       Review of Systems   Constitutional:  Positive for activity change. Negative for chills, fatigue and fever.   HENT:  Negative for congestion, facial swelling, hearing loss, sinus pressure, sinus pain, sneezing, sore throat and trouble swallowing.    Eyes:  Negative for pain, discharge,  redness and visual disturbance.   Respiratory:  Negative for choking, chest tightness, shortness of breath and wheezing.    Cardiovascular:  Negative for chest pain, palpitations and leg swelling.   Gastrointestinal:  Negative for abdominal pain, constipation, diarrhea, nausea and vomiting.   Genitourinary:  Negative for difficulty urinating, dysuria, hematuria and urgency.   Musculoskeletal:  Positive for arthralgias, back pain and gait problem. Negative for neck pain and neck stiffness.   Neurological:  Positive for syncope and light-headedness. Negative for dizziness, speech difficulty, weakness and numbness.   Psychiatric/Behavioral:  Negative for agitation and behavioral problems.    Objective:     Vital Signs (Most Recent):  Temp: 98.6 °F (37 °C) (06/14/23 1117)  Pulse: 85 (06/14/23 1117)  Resp: 19 (06/14/23 1117)  BP: (!) 168/81 (06/14/23 1117)  SpO2: (!) 93 % (06/14/23 1117) Vital Signs (24h Range):  Temp:  [98.1 °F (36.7 °C)-98.6 °F (37 °C)] 98.6 °F (37 °C)  Pulse:  [71-90] 85  Resp:  [17-31] 19  SpO2:  [90 %-98 %] 93 %  BP: ()/(49-81) 168/81     Weight: 91.2 kg (201 lb)  Body mass index is 28.03 kg/m².  No intake or output data in the 24 hours ending 06/14/23 1200      Physical Exam  Vitals reviewed.   Constitutional:       General: He is not in acute distress.     Appearance: Normal appearance. He is not ill-appearing.   HENT:      Head: Normocephalic. Contusion present.      Comments: Pain upon palpation.     Right Ear: External ear normal.      Left Ear: External ear normal.      Nose: Nose normal.      Mouth/Throat:      Mouth: Mucous membranes are moist.      Pharynx: Oropharynx is clear.   Eyes:      General:         Right eye: No discharge.         Left eye: No discharge.      Extraocular Movements: Extraocular movements intact.      Pupils: Pupils are equal, round, and reactive to light.   Cardiovascular:      Rate and Rhythm: Normal rate and regular rhythm.      Pulses: Normal pulses.       Heart sounds: Normal heart sounds. No murmur heard.    No friction rub. No gallop.      Comments: Admission EKG reviewed and noted to show: NSR at 77 bpm.  Pulmonary:      Effort: Pulmonary effort is normal. No respiratory distress.      Breath sounds: Normal breath sounds. No wheezing.   Abdominal:      General: Bowel sounds are normal. There is no distension.      Palpations: Abdomen is soft.   Musculoskeletal:      Cervical back: Decreased range of motion.      Lumbar back: Decreased range of motion.      Right lower leg: No edema.      Left lower leg: No edema.   Skin:     General: Skin is warm.   Neurological:      Mental Status: He is alert and oriented to person, place, and time. Mental status is at baseline.           Significant Labs: All pertinent labs within the past 24 hours have been reviewed.  Bilirubin:   Recent Labs   Lab 05/25/23 2026 06/13/23 1129 06/14/23  0441   BILITOT 0.2 0.4 0.6     BMP:   Recent Labs   Lab 06/13/23 1129 06/14/23  0441   * 200*    138   K 5.8* 4.9    108   CO2 17* 21*   BUN 35* 29*   CREATININE 2.3* 1.1   CALCIUM 10.2 8.8   MG 1.7  --      CBC:   Recent Labs   Lab 06/13/23  1129   WBC 11.07   HGB 15.2   HCT 46.5        CMP:   Recent Labs   Lab 06/13/23 1129 06/14/23  0441    138   K 5.8* 4.9    108   CO2 17* 21*   * 200*   BUN 35* 29*   CREATININE 2.3* 1.1   CALCIUM 10.2 8.8   PROT 8.1 7.1   ALBUMIN 4.1 3.5   BILITOT 0.4 0.6   ALKPHOS 82 70   AST 20 21   ALT 16 14   ANIONGAP 14 9     Magnesium:   Recent Labs   Lab 06/13/23  1129   MG 1.7     POCT Glucose:   Recent Labs   Lab 06/13/23  1938 06/14/23  0728 06/14/23  1116   POCTGLUCOSE 159* 185* 197*     Troponin:   Recent Labs   Lab 06/13/23  1129   TROPONINI 0.017     Urine Studies:   Recent Labs   Lab 06/13/23  1512   COLORU Yellow   APPEARANCEUA Hazy*   PHUR 5.0   SPECGRAV 1.025   PROTEINUA Trace*   GLUCUA 1+*   KETONESU Negative   BILIRUBINUA Negative   OCCULTUA Negative  "  NITRITE Negative   UROBILINOGEN Negative   LEUKOCYTESUR Negative       Significant Imaging: I have reviewed all pertinent imaging results/findings within the past 24 hours.      Assessment/Plan:      * Traumatic intracerebral hemorrhage  Patient presented to the ED s/p witnessed syncopal episode at work; similar event occurred 2 weeks ago as well.  CT Head shows: "Inferior left frontal contusion with associated small foci of parenchymal and subarachnoid hemorrhage. Some minimal additional subarachnoid hemorrhage is present along the right posterior frontoparietal region."  Case was discussed with general neurology, who recommended neurosurgery consult; appreciate recommendations.  Patient complaining of significant back pain s/p fall and pain while walking. PT/OT consulted.    Recurrent syncope  Patient and family at bedside endorses recurrent syncope while at work.  Echocardiogram ordered.    ABDIRIZAK (acute kidney injury)  Patient with acute kidney injury likely due to IVVD/dehydration ABDIRIZAK is currently worsening. Labs reviewed- Renal function/electrolytes with Estimated Creatinine Clearance: 76.3 mL/min (based on SCr of 1.1 mg/dL). according to latest data. Monitor urine output and serial BMP and adjust therapy as needed. Avoid nephrotoxins and renally dose meds for GFR listed above.     06/14: Resolved.    Hypothyroidism  Continue home regimen of synthroid    Hyperlipidemia  Continue statin    Diabetes mellitus  Patient's FSGs are controlled on current medication regimen.  Last A1c reviewed- No results found for: LABA1C, HGBA1C  Most recent fingerstick glucose reviewed- No results for input(s): POCTGLUCOSE in the last 24 hours.  Current correctional scale  Medium  Maintain anti-hyperglycemic dose as follows-   Antihyperglycemics (From admission, onward)    Start     Stop Route Frequency Ordered    06/13/23 1828  insulin aspart U-100 pen 1-10 Units         -- SubQ Before meals & nightly PRN 06/13/23 1729        Hold " Oral hypoglycemics while patient is in the hospital.    Hypertension  Well controlled, hold ACEi due to renal function, monitor blood pressure, adjust as needed.       VTE Risk Mitigation (From admission, onward)         Ordered     IP VTE LOW RISK PATIENT  Once         06/13/23 1729     Place sequential compression device  Until discontinued         06/13/23 1729                Discharge Planning   TAD:      Code Status: Full Code   Is the patient medically ready for discharge?:     Reason for patient still in hospital (select all that apply): Treatment, Imaging, Consult recommendations and PT / OT recommendations  Discharge Plan A: Home with family        Yuriy Stephens PA-C  Department of Hospital Medicine  [facility]  06/14/2023

## 2023-06-14 NOTE — ASSESSMENT & PLAN NOTE
Patient with acute kidney injury likely due to IVVD/dehydration ABDIRIZAK is currently worsening. Labs reviewed- Renal function/electrolytes with Estimated Creatinine Clearance: 76.3 mL/min (based on SCr of 1.1 mg/dL). according to latest data. Monitor urine output and serial BMP and adjust therapy as needed. Avoid nephrotoxins and renally dose meds for GFR listed above.     06/14: Resolved.

## 2023-06-14 NOTE — PT/OT/SLP EVAL
Physical Therapy Evaluation     Patient Name: Carlin Madrigal   MRN: [id number]  Recent Surgery: * No surgery found *      Recommendations:     Discharge Recommendations: home   Discharge Equipment Recommendations: none     Assessment:     Carlin Madrigal is a 68 y.o. male admitted with a medical diagnosis of Traumatic intracerebral hemorrhage. He presents with the following impairments/functional limitations: impaired endurance, impaired functional mobility, pain. Mobility limited by pain and SOB however, no skilled PT needed at this time.     Rehab Prognosis: Good; patient would benefit from acute PT services to address these deficits and reach maximum level of function.    Plan:     During this hospitalization, patient to be seen 1x to address the above listed problems via initial evaluation.    Plan of Care Expires: 06/14/23    Subjective     Chief Complaint: Pain (R) posterior side  Patient Comments/Goals: Pt agreeable to therapy evals.  Pain/Comfort:  Pain Rating 1: 8/10  Location - Side 1: Right  Location - Orientation 1: posterior  Location 1: rib(s)  Pain Addressed 1: Reposition    Social History:  Living Environment: Patient lives with their spouse in a single story home with number of outside stair(s): none  Prior Level of Function: Prior to admission, patient was independent; works in construction and drives.  Equipment Used at Home: none  DME owned (not currently used): none  Assistance Upon Discharge:  wife    Objective:     Communicated with nurseMonique prior to session. Patient found supine with telemetry upon PT entry to room.    General Precautions: Standard, fall   Orthopedic Precautions:     Braces: N/A    Respiratory Status: Room air    Exams:  Cognition: Patient is oriented to Person, Place, Time, Situation  RLE ROM: WFL  RLE Strength: WFL  LLE ROM: WFL  LLE Strength: WFL      Functional Mobility:  Gait belt applied - No  Bed Mobility: SBA  Transfers  Sit to Stand: stand by assistance with no  AD  Gait  Patient ambulated 30'  with no AD and stand by assistance. Patient demonstrates decreased step length, decreased wolfgang, and SOB; O2 sats 96%, hr 91 .   Balance  Sitting: stand by assistance  Standing: stand by assistance  Pt/wife reports pt getting OOB and to BR independently.    Therapeutic Activities and Exercises:   Patient educated on role of acute care PT and PT POC  Pt en route to cardiology; ambulated to transport chair.      AM-PAC 6 CLICK MOBILITY  Total Score:24    Patient left up in chair with RN notified.    GOALS:   Multidisciplinary Problems       Physical Therapy Goals          Problem: Physical Therapy    Goal Priority Disciplines Outcome Goal Variances Interventions   Physical Therapy Goal     PT, PT/OT Adequate for Care Transition                         History:     Past Medical History:   Diagnosis Date    Diabetes mellitus     Hypertension        No past surgical history on file.    Time Tracking:     PT Received On: 06/14/23  PT Start Time: 1205  PT Stop Time: 1213  PT Total Time (min): 8 min     Billable Minutes: Evaluation 8    6/14/2023

## 2023-06-15 VITALS
DIASTOLIC BLOOD PRESSURE: 72 MMHG | RESPIRATION RATE: 20 BRPM | SYSTOLIC BLOOD PRESSURE: 140 MMHG | HEIGHT: 71 IN | BODY MASS INDEX: 28.14 KG/M2 | TEMPERATURE: 98 F | HEART RATE: 66 BPM | OXYGEN SATURATION: 96 % | WEIGHT: 201 LBS

## 2023-06-15 DIAGNOSIS — I60.8 OTHER NONTRAUMATIC SUBARACHNOID HEMORRHAGE: Primary | ICD-10-CM

## 2023-06-15 LAB
ALBUMIN SERPL BCP-MCNC: 3.3 G/DL (ref 3.5–5.2)
ALP SERPL-CCNC: 64 U/L (ref 55–135)
ALT SERPL W/O P-5'-P-CCNC: 9 U/L (ref 10–44)
ANION GAP SERPL CALC-SCNC: 8 MMOL/L (ref 8–16)
AST SERPL-CCNC: 20 U/L (ref 10–40)
BILIRUB SERPL-MCNC: 0.9 MG/DL (ref 0.1–1)
BUN SERPL-MCNC: 19 MG/DL (ref 8–23)
CALCIUM SERPL-MCNC: 8.8 MG/DL (ref 8.7–10.5)
CHLORIDE SERPL-SCNC: 102 MMOL/L (ref 95–110)
CO2 SERPL-SCNC: 24 MMOL/L (ref 23–29)
CREAT SERPL-MCNC: 1 MG/DL (ref 0.5–1.4)
EST. GFR  (NO RACE VARIABLE): >60 ML/MIN/1.73 M^2
GLUCOSE SERPL-MCNC: 183 MG/DL (ref 70–110)
POCT GLUCOSE: 161 MG/DL (ref 70–110)
POCT GLUCOSE: 180 MG/DL (ref 70–110)
POTASSIUM SERPL-SCNC: 4.8 MMOL/L (ref 3.5–5.1)
PROT SERPL-MCNC: 7.2 G/DL (ref 6–8.4)
SODIUM SERPL-SCNC: 134 MMOL/L (ref 136–145)

## 2023-06-15 PROCEDURE — 25000003 PHARM REV CODE 250: Performed by: HOSPITALIST

## 2023-06-15 PROCEDURE — 99223 PR INITIAL HOSPITAL CARE,LEVL III: ICD-10-PCS | Mod: ,,, | Performed by: PHYSICIAN ASSISTANT

## 2023-06-15 PROCEDURE — 36415 COLL VENOUS BLD VENIPUNCTURE: CPT | Performed by: HOSPITALIST

## 2023-06-15 PROCEDURE — 97530 THERAPEUTIC ACTIVITIES: CPT

## 2023-06-15 PROCEDURE — 80053 COMPREHEN METABOLIC PANEL: CPT | Performed by: HOSPITALIST

## 2023-06-15 PROCEDURE — 99223 1ST HOSP IP/OBS HIGH 75: CPT | Mod: ,,, | Performed by: PHYSICIAN ASSISTANT

## 2023-06-15 PROCEDURE — G0378 HOSPITAL OBSERVATION PER HR: HCPCS

## 2023-06-15 PROCEDURE — 25000003 PHARM REV CODE 250

## 2023-06-15 RX ADMIN — TIZANIDINE 4 MG: 4 TABLET ORAL at 02:06

## 2023-06-15 RX ADMIN — GABAPENTIN 300 MG: 300 CAPSULE ORAL at 08:06

## 2023-06-15 RX ADMIN — TIZANIDINE 4 MG: 4 TABLET ORAL at 06:06

## 2023-06-15 RX ADMIN — LIDOCAINE PATCH 5% 1 PATCH: 700 PATCH TOPICAL at 08:06

## 2023-06-15 RX ADMIN — INSULIN ASPART 2 UNITS: 100 INJECTION, SOLUTION INTRAVENOUS; SUBCUTANEOUS at 08:06

## 2023-06-15 RX ADMIN — OXYCODONE AND ACETAMINOPHEN 1 TABLET: 5; 325 TABLET ORAL at 06:06

## 2023-06-15 RX ADMIN — LEVOTHYROXINE SODIUM 50 MCG: 50 TABLET ORAL at 06:06

## 2023-06-15 RX ADMIN — POLYETHYLENE GLYCOL 3350 17 G: 17 POWDER, FOR SOLUTION ORAL at 08:06

## 2023-06-15 RX ADMIN — AMLODIPINE BESYLATE 10 MG: 5 TABLET ORAL at 08:06

## 2023-06-15 RX ADMIN — GABAPENTIN 300 MG: 300 CAPSULE ORAL at 02:06

## 2023-06-15 NOTE — ASSESSMENT & PLAN NOTE
Carlin Madrigal is a 68 y.o. male with DM and HTN who presented to [facility] ED following a syncopal episode. He was found to be hypoglycemic and admitted to  for observation. Complaints of HA and dizziness prompted a HCT which revealed left frontal and right parietoccipital hemorraghic contusions and a small SAH. Multiple subsequent images have been stable. He is not on anticoagulation or antiplatelet therapy at baseline. HA has resolved. Back pain is improved. No acute fractures on imaging and neuro intact on exam.     -No acute neurosurgical intervention indicated  -Avoid blood thinners and NSAIDs x 2 weeks. OK to take tylenol as needed for back pain.   -FU in neurosurgery clinic in 2 weeks with repeat HCT    Case discussed with Dr. Hutchins     Please call with any questions, concerns, or changes in the patient's neurologic status.

## 2023-06-15 NOTE — PT/OT/SLP PROGRESS
Occupational Therapy   Treatment/Discharge     Name: Carlin Madrigal  MRN: [id number]  Admitting Diagnosis:  Traumatic intracerebral hemorrhage       Recommendations:     Discharge Recommendations: home  Discharge Equipment Recommendations:  none  Barriers to discharge:  None    Assessment:     Carlin Madrigal is a 68 y.o. male with a medical diagnosis of Traumatic intracerebral hemorrhage.  Performance deficits affecting function are weakness, impaired endurance, impaired functional mobility, impaired self care skills, gait instability, impaired balance, pain, decreased ROM, decreased lower extremity function, decreased safety awareness.     Pt w/ much improvement in regards to back pain as he was able to ambulate functional distances (~250 ft) w/ SBA-supervision and no AD.     Rehab Prognosis:  Good; patient would benefit from acute skilled OT services to address these deficits and reach maximum level of function.       Plan:     Patient not in need of skilled OT services at this time. D/C orders.   Plan of Care Expires: 06/28/23  Plan of Care Reviewed with: patient, spouse, daughter    Subjective     Chief Complaint: None   Patient/Family Comments/goals: Ready to go home   Pain/Comfort:  Pain Rating 1: 0/10  Pain Rating Post-Intervention 1: 0/10    Objective:     Communicated with: Nurse prior to session.  Patient found HOB elevated with telemetry upon OT entry to room.    General Precautions: Standard, fall    Orthopedic Precautions:N/A  Braces: N/A  Respiratory Status: Room air     Occupational Performance:     Bed Mobility:    Patient completed Scooting/Bridging with modified independence  Patient completed Supine to Sit with modified independence     Functional Mobility/Transfers:  Patient completed Sit <> Stand Transfer with supervision  with  no assistive device   Functional Mobility: Pt ambulated functional distances w/ SBA-supervision and no AD. No instability or LOB.     Activities of Daily Living:  No need at  this time      [facility] 6 Click ADL: 24    Treatment & Education:  -Pt re-educated on role of OT and POC.   -Questions and concerns addressed within scope.     Patient left sitting edge of bed with all lines intact and call button in reach    GOALS:   Multidisciplinary Problems       Occupational Therapy Goals       Not on file              Multidisciplinary Problems (Resolved)          Problem: Occupational Therapy    Goal Priority Disciplines Outcome Interventions   Occupational Therapy Goal   (Resolved)     OT, PT/OT Met    Description: Goals to be met by: 6/14/23     Patient will increase functional independence with ADLs by performing:    LE Dressing with Modified [place].  Grooming while standing at sink with Modified [place].  Toileting from toilet with Modified [place] for hygiene and clothing management.   Step transfer with Modified [place]  Toilet transfer to toilet with Modified [place].  Upper extremity exercise program x15 reps per handout, with independence.                         Time Tracking:     OT Date of Treatment: 06/15/23  OT Start Time: 1330  OT Stop Time: 1339  OT Total Time (min): 9 min    Billable Minutes:Therapeutic Activity 9  Total Time 9    OT/ROSSY: OT          6/15/2023

## 2023-06-15 NOTE — PLAN OF CARE
CM notified nurseMary that pt is ready for discharge from CM standpoint. All CM needs met.     06/15/23 1333   Final Note   Assessment Type Final Discharge Note   Anticipated Discharge Disposition Home   What phone number can be called within the next 1-3 days to see how you are doing after discharge? [phone]   Hospital Resources/Appts/Education Provided Appointments scheduled and added to AVS;Appointments scheduled by Navigator/Coordinator   Post-Acute Status   Discharge Delays None known at this time

## 2023-06-15 NOTE — HPI
Carlin Madrigal is a 68-year-old male with dm and HTN who presented to [facility] on 06/13/2023 with complaints of syncope with fall to his back.  He was found to be hypoglycemic admitted to observation.  After continued headache and reports of dizziness, he underwent a head CT the following morning.  Head CT revealed left frontal parenchymal contusion with mild parenchymal and subarachnoid hemorrhage. Upon notification of consult, transfer to [facility] was recommended. However, 6 hours had passed since the initial CT, so [facility] recommended repeat HCT with transfer only if imaging showed progression of hemorrhage. Repeat head CT was stable, so the patient remained at [facility]. Patient evaluated this morning for FU of repeat HCT and complaints of back pain following the fall. Denies numbness, weakness, vision changes, confusion, b/b dysfunction, and gait disturbance. Back pain is still present but greatly improved. Denies use of blood thinners at baseline.

## 2023-06-15 NOTE — NURSING
Report received from night nurse KATYA Springer. Visualized patient and assessed patient's overall condition and appearance. No acute distress noted. Will continue to monitor

## 2023-06-15 NOTE — PLAN OF CARE
Problem: Adult Inpatient Plan of Care  Goal: Plan of Care Review  Outcome: Met  Goal: Patient-Specific Goal (Individualized)  Outcome: Met  Goal: Absence of Hospital-Acquired Illness or Injury  Outcome: Met  Goal: Optimal Comfort and Wellbeing  Outcome: Met  Goal: Readiness for Transition of Care  Outcome: Met     Problem: Infection  Goal: Absence of Infection Signs and Symptoms  Outcome: Met     Problem: Diabetes Comorbidity  Goal: Blood Glucose Level Within Targeted Range  Outcome: Met     Problem: Fluid and Electrolyte Imbalance (Acute Kidney Injury/Impairment)  Goal: Fluid and Electrolyte Balance  Outcome: Met     Problem: Oral Intake Inadequate (Acute Kidney Injury/Impairment)  Goal: Optimal Nutrition Intake  Outcome: Met     Problem: Renal Function Impairment (Acute Kidney Injury/Impairment)  Goal: Effective Renal Function  Outcome: Met

## 2023-06-15 NOTE — CONSULTS
[facility] - Telemetry  Neurosurgery  Consult Note    Inpatient consult to Neurosurgery  Consult performed by: Sarah Cisneros PA-C  Consult ordered by: Yuriy Stephens PA-C  Reason for consult: traumatic ICH        Subjective:     Chief Complaint/Reason for Admission: traumatic ICH    History of Present Illness: Carlin Madrigal is a 68-year-old male with dm and HTN who presented to [facility] on 06/13/2023 with complaints of syncope with fall to his back.  He was found to be hypoglycemic admitted to observation.  After continued headache and reports of dizziness, he underwent a head CT the following morning.  Head CT revealed left frontal parenchymal contusion with mild parenchymal and subarachnoid hemorrhage. Upon notification of consult, transfer to [facility] was recommended. However, 6 hours had passed since the initial CT, so [facility] recommended repeat HCT with transfer only if imaging showed progression of hemorrhage. Repeat head CT was stable, so the patient remained at [facility]. Patient evaluated this morning for FU of repeat HCT and complaints of back pain following the fall. Denies numbness, weakness, vision changes, confusion, b/b dysfunction, and gait disturbance. Back pain is still present but greatly improved. Denies use of blood thinners at baseline.       Medications Prior to Admission   Medication Sig Dispense Refill Last Dose    levothyroxine (SYNTHROID) 50 MCG tablet Take 50 mcg by mouth Daily.   6/13/2023    metFORMIN (GLUCOPHAGE) 1000 MG tablet Take 1,000 mg by mouth 2 (two) times a day.   6/13/2023    gabapentin (NEURONTIN) 300 MG capsule Take 1 capsule by mouth 3 (three) times daily.       insulin (LANTUS SOLOSTAR U-100 INSULIN) glargine 100 units/mL SubQ pen Inject 20 Units into the skin nightly.       lisinopriL (PRINIVIL,ZESTRIL) 40 MG tablet Take 40 mg by mouth Daily.       meclizine (ANTIVERT) 25 mg tablet Take 1 tablet (25 mg total) by mouth 3 (three) times  daily as needed for Dizziness. 20 tablet 0     NOVOLIN 70/30 U-100 INSULIN 100 unit/mL (70-30) injection Inject 15 Units into the skin 2 (two) times a day.       pravastatin (PRAVACHOL) 20 MG tablet Take 20 mg by mouth Daily.          Review of patient's allergies indicates:  No Known Allergies    Past Medical History:   Diagnosis Date    Diabetes mellitus     Hypertension      No past surgical history on file.  Family History    None       Tobacco Use    Smoking status: Never    Smokeless tobacco: Never   Substance and Sexual Activity    Alcohol use: Yes     Comment: 06/13/23: occ    Drug use: Not Currently    Sexual activity: Not on file     Review of Systems   Eyes:  Negative for visual disturbance.   Gastrointestinal:  Negative for constipation.   Genitourinary:  Negative for difficulty urinating.   Musculoskeletal:  Positive for back pain. Negative for gait problem, myalgias and neck pain.   Neurological:  Positive for syncope. Negative for weakness and numbness.   Psychiatric/Behavioral:  Negative for confusion.    Objective:     Weight: 91.2 kg (201 lb)  Body mass index is 28.03 kg/m².  Vital Signs (Most Recent):  Temp: 97.9 °F (36.6 °C) (06/15/23 0800)  Pulse: 60 (06/15/23 0800)  Resp: 20 (06/15/23 0800)  BP: 135/63 (06/15/23 0800)  SpO2: (!) 94 % (06/15/23 0800) Vital Signs (24h Range):  Temp:  [97.6 °F (36.4 °C)-99.8 °F (37.7 °C)] 97.9 °F (36.6 °C)  Pulse:  [60-85] 60  Resp:  [16-20] 20  SpO2:  [93 %-96 %] 94 %  BP: (126-168)/(63-83) 135/63      Physical Exam         Neurosurgery Physical Exam  General: well developed, well nourished, no distress.   Head: normocephalic, atraumatic  Neurologic: Awake, Alert, Oriented x 4. Thought content appropriate.  GCS: Motor: 6/Verbal: 5/Eyes: 4 GCS Total: 15  Language: No aphasia  Speech: No dysarthria  Cranial nerves: face symmetric, tongue midline, CN II-XII grossly intact.   Eyes: pupils equal, round, reactive to light with accomodation, EOMI.  Pulmonary:  normal respirations, no signs of respiratory distress  Abdomen: soft, non-distended, not tender to palpation    Sensory: intact to light touch throughout    Motor Strength: Moves all extremities spontaneously with good tone.  Full strength upper and lower extremities. No abnormal movements seen.     Strength  Deltoids Triceps Biceps   Hand    Upper: R 5/5 5/5 5/5   5/5    L 5/5 5/5 5/5   5/5     Iliopsoas Quadriceps Knee  Flexion Tibialis  anterior Gastro- cnemius EHL   Lower: R 5/5 5/5 5/5 5/5 5/5 5/5    L 5/5 5/5 5/5 5/5 5/5 5/5     DTR's: 1 + and symmetric in patellar    Mendez: absent  Clonus: absent    Skin: Skin is warm, dry and intact.  Midline bony tenderness: negative throughout   Paraspinous muscle tenderness positive over right lower thoracic spine     Pronator Drift: no drift noted  Finger-to-nose: Intact bilaterally      Significant Labs:  Recent Labs   Lab 06/13/23  1129 06/14/23  0441 06/15/23  0350   * 200* 183*    138 134*   K 5.8* 4.9 4.8    108 102   CO2 17* 21* 24   BUN 35* 29* 19   CREATININE 2.3* 1.1 1.0   CALCIUM 10.2 8.8 8.8   MG 1.7  --   --      Recent Labs   Lab 06/13/23  1129 06/14/23  1709   WBC 11.07 10.09   HGB 15.2 14.0   HCT 46.5 43.0    225     No results for input(s): LABPT, INR, APTT in the last 48 hours.  Microbiology Results (last 7 days)       ** No results found for the last 168 hours. **              Significant Diagnostics:  I have personally reviewed imaging and agree with the findings.     Xray thoracic spine 6/15/23  No acute fracture dislocation bone destruction seen.     MRI brain without contrast 6/15/23  No mass effect or herniation seen.  There is no evidence of diffuse axonal injury or shear injury.   Multifocal hemorrhagic contusions, subarachnoid blood, and subdural blood without mass effect.   Unchanged from prior head CT    CT head #3 06/15/2023   1. No acute detrimental change relative prior head CT 06/14/2023, 16:38 hours.   2.  Maturing bifrontal and left temporal lobe hemorrhagic contusions.  Similar relatively small volume subarachnoid hemorrhage and questioned thin parafalcine subdural blood essentially unchanged.   3. No new or enlarging areas of intracranial hemorrhage or worsening mass effect appreciated.     CT lumbar spine 06/14/2023   1. No acute abnormality.   2. Multilevel chronic degenerative changes    CT head #2 06/14/2023  1. No significant change from the earlier study.   2. Inferior left frontal parenchymal contusion with mild associated edema and minimal parenchymal and subarachnoid hemorrhage.  Findings are similar to the prior study.  Recommend surveillance/follow-up.  See above comments.   3. Paranasal sinus disease.     CT head #1 06/14/2023  Inferior left frontal contusion with associated small foci of parenchymal and subarachnoid hemorrhage.  Some minimal additional subarachnoid hemorrhage is present along the right posterior frontoparietal region.     X-ray lumbar spine 06/14/2023   The vertebral bodies are normal in height.  There is grade 1 anterolisthesis at L4-5.  There is mild osteophytic spurring along lumbar vertebral endplates with moderate to advanced osteophytic spurring at the visualized lower thoracic spine.  Mild disc space narrowing present at L4-5 and L5-S1.  Mild facet degenerative change present in the lower spine    X-ray cervical spine 06/14/2023   Degenerative changes    Assessment/Plan:     * Traumatic intracerebral hemorrhage  Carlin Madrigal is a 68 y.o. male with DM and HTN who presented to [facility] ED following a syncopal episode. He was found to be hypoglycemic and admitted to  for observation. Complaints of HA and dizziness prompted a HCT which revealed left frontal and right parietoccipital hemorraghic contusions and a small SAH. Multiple subsequent images have been stable. He is not on anticoagulation or antiplatelet therapy at baseline. HA has resolved. Back pain is improved.  No acute fractures on imaging and neuro intact on exam.     -No acute neurosurgical intervention indicated  -Avoid blood thinners and NSAIDs x 2 weeks. OK to take tylenol as needed for back pain.   -FU in neurosurgery clinic in 2 weeks with repeat HCT    Case discussed with Dr. Hutchins     Please call with any questions, concerns, or changes in the patient's neurologic status.         Thank you for your consult. I will sign off. Please contact us if you have any additional questions.    JHONNY BassC  Neurosurgery  [facility] - Telemetry

## 2023-06-15 NOTE — NURSING
[facility], [facility]  Nurses Note -- 4 Eyes      6/15/2023       Skin assessed on: Q Shift      [x] No Pressure Injuries Present    []Prevention Measures Documented    [] Yes LDA  for Pressure Injury Previously documented     [] Yes New Pressure Injury Discovered   [] LDA for New Pressure Injury Added      Attending RN:  Mary Ashford LPN     Second RN:  KATYA Springer

## 2023-06-15 NOTE — SUBJECTIVE & OBJECTIVE
Medications Prior to Admission   Medication Sig Dispense Refill Last Dose    levothyroxine (SYNTHROID) 50 MCG tablet Take 50 mcg by mouth Daily.   6/13/2023    metFORMIN (GLUCOPHAGE) 1000 MG tablet Take 1,000 mg by mouth 2 (two) times a day.   6/13/2023    gabapentin (NEURONTIN) 300 MG capsule Take 1 capsule by mouth 3 (three) times daily.       insulin (LANTUS SOLOSTAR U-100 INSULIN) glargine 100 units/mL SubQ pen Inject 20 Units into the skin nightly.       lisinopriL (PRINIVIL,ZESTRIL) 40 MG tablet Take 40 mg by mouth Daily.       meclizine (ANTIVERT) 25 mg tablet Take 1 tablet (25 mg total) by mouth 3 (three) times daily as needed for Dizziness. 20 tablet 0     NOVOLIN 70/30 U-100 INSULIN 100 unit/mL (70-30) injection Inject 15 Units into the skin 2 (two) times a day.       pravastatin (PRAVACHOL) 20 MG tablet Take 20 mg by mouth Daily.          Review of patient's allergies indicates:  No Known Allergies    Past Medical History:   Diagnosis Date    Diabetes mellitus     Hypertension      No past surgical history on file.  Family History    None       Tobacco Use    Smoking status: Never    Smokeless tobacco: Never   Substance and Sexual Activity    Alcohol use: Yes     Comment: 06/13/23: occ    Drug use: Not Currently    Sexual activity: Not on file     Review of Systems   Eyes:  Negative for visual disturbance.   Gastrointestinal:  Negative for constipation.   Genitourinary:  Negative for difficulty urinating.   Musculoskeletal:  Positive for back pain. Negative for gait problem, myalgias and neck pain.   Neurological:  Positive for syncope. Negative for weakness and numbness.   Psychiatric/Behavioral:  Negative for confusion.    Objective:     Weight: 91.2 kg (201 lb)  Body mass index is 28.03 kg/m².  Vital Signs (Most Recent):  Temp: 97.9 °F (36.6 °C) (06/15/23 0800)  Pulse: 60 (06/15/23 0800)  Resp: 20 (06/15/23 0800)  BP: 135/63 (06/15/23 0800)  SpO2: (!) 94 % (06/15/23 0800) Vital Signs (24h  Range):  Temp:  [97.6 °F (36.4 °C)-99.8 °F (37.7 °C)] 97.9 °F (36.6 °C)  Pulse:  [60-85] 60  Resp:  [16-20] 20  SpO2:  [93 %-96 %] 94 %  BP: (126-168)/(63-83) 135/63      Physical Exam         Neurosurgery Physical Exam  General: well developed, well nourished, no distress.   Head: normocephalic, atraumatic  Neurologic: Awake, Alert, Oriented x 4. Thought content appropriate.  GCS: Motor: 6/Verbal: 5/Eyes: 4 GCS Total: 15  Language: No aphasia  Speech: No dysarthria  Cranial nerves: face symmetric, tongue midline, CN II-XII grossly intact.   Eyes: pupils equal, round, reactive to light with accomodation, EOMI.  Pulmonary: normal respirations, no signs of respiratory distress  Abdomen: soft, non-distended, not tender to palpation    Sensory: intact to light touch throughout    Motor Strength: Moves all extremities spontaneously with good tone.  Full strength upper and lower extremities. No abnormal movements seen.     Strength  Deltoids Triceps Biceps   Hand    Upper: R 5/5 5/5 5/5   5/5    L 5/5 5/5 5/5   5/5     Iliopsoas Quadriceps Knee  Flexion Tibialis  anterior Gastro- cnemius EHL   Lower: R 5/5 5/5 5/5 5/5 5/5 5/5    L 5/5 5/5 5/5 5/5 5/5 5/5     DTR's: 1 + and symmetric in patellar    Mendez: absent  Clonus: absent    Skin: Skin is warm, dry and intact.  Midline bony tenderness: negative throughout   Paraspinous muscle tenderness positive over right lower thoracic spine     Pronator Drift: no drift noted  Finger-to-nose: Intact bilaterally      Significant Labs:  Recent Labs   Lab 06/13/23  1129 06/14/23  0441 06/15/23  0350   * 200* 183*    138 134*   K 5.8* 4.9 4.8    108 102   CO2 17* 21* 24   BUN 35* 29* 19   CREATININE 2.3* 1.1 1.0   CALCIUM 10.2 8.8 8.8   MG 1.7  --   --      Recent Labs   Lab 06/13/23  1129 06/14/23  1709   WBC 11.07 10.09   HGB 15.2 14.0   HCT 46.5 43.0    225     No results for input(s): LABPT, INR, APTT in the last 48 hours.  Microbiology Results (last  7 days)       ** No results found for the last 168 hours. **              Significant Diagnostics:  I have personally reviewed imaging and agree with the findings.     Xray thoracic spine 6/15/23  No acute fracture dislocation bone destruction seen.     MRI brain without contrast 6/15/23  No mass effect or herniation seen.  There is no evidence of diffuse axonal injury or shear injury.   Multifocal hemorrhagic contusions, subarachnoid blood, and subdural blood without mass effect.   Unchanged from prior head CT    CT head #3 06/15/2023   1. No acute detrimental change relative prior head CT 06/14/2023, 16:38 hours.   2. Maturing bifrontal and left temporal lobe hemorrhagic contusions.  Similar relatively small volume subarachnoid hemorrhage and questioned thin parafalcine subdural blood essentially unchanged.   3. No new or enlarging areas of intracranial hemorrhage or worsening mass effect appreciated.     CT lumbar spine 06/14/2023   1. No acute abnormality.   2. Multilevel chronic degenerative changes    CT head #2 06/14/2023  1. No significant change from the earlier study.   2. Inferior left frontal parenchymal contusion with mild associated edema and minimal parenchymal and subarachnoid hemorrhage.  Findings are similar to the prior study.  Recommend surveillance/follow-up.  See above comments.   3. Paranasal sinus disease.     CT head #1 06/14/2023  Inferior left frontal contusion with associated small foci of parenchymal and subarachnoid hemorrhage.  Some minimal additional subarachnoid hemorrhage is present along the right posterior frontoparietal region.     X-ray lumbar spine 06/14/2023   The vertebral bodies are normal in height.  There is grade 1 anterolisthesis at L4-5.  There is mild osteophytic spurring along lumbar vertebral endplates with moderate to advanced osteophytic spurring at the visualized lower thoracic spine.  Mild disc space narrowing present at L4-5 and L5-S1.  Mild facet degenerative  change present in the lower spine    X-ray cervical spine 06/14/2023   Degenerative changes

## 2023-06-15 NOTE — NURSING
[facility], [facility]  Nurses Note -- 4 Eyes      6/14/2023       Skin assessed on: Q Shift      [x] No Pressure Injuries Present    [x]Prevention Measures Documented    [] Yes LDA  for Pressure Injury Previously documented     [] Yes New Pressure Injury Discovered   [] LDA for New Pressure Injury Added      Attending RN:  Racheal Sandy RN     Second RN:  Monique Lopez RN

## 2023-06-15 NOTE — PLAN OF CARE
Problem: Occupational Therapy  Goal: Occupational Therapy Goal  Description: Goals to be met by: 6/14/23     Patient will increase functional independence with ADLs by performing:    LE Dressing with Modified [place].  Grooming while standing at sink with Modified [place].  Toileting from toilet with Modified [place] for hygiene and clothing management.   Step transfer with Modified [place]  Toilet transfer to toilet with Modified [place].  Upper extremity exercise program x15 reps per handout, with independence.    6/15/2023 1340 by Rosy Miller OT  Outcome: Met

## 2023-06-16 ENCOUNTER — HOSPITAL ENCOUNTER (EMERGENCY)
Facility: HOSPITAL | Age: 68
Discharge: HOME | End: 2023-06-16
Attending: EMERGENCY MEDICINE
Payer: OTHER MISCELLANEOUS

## 2023-06-16 VITALS
DIASTOLIC BLOOD PRESSURE: 97 MMHG | SYSTOLIC BLOOD PRESSURE: 172 MMHG | WEIGHT: 204 LBS | TEMPERATURE: 99 F | RESPIRATION RATE: 20 BRPM | BODY MASS INDEX: 28.45 KG/M2 | HEART RATE: 81 BPM | OXYGEN SATURATION: 97 %

## 2023-06-16 DIAGNOSIS — Z79.4 TYPE 2 DIABETES MELLITUS WITH HYPERGLYCEMIA, WITH LONG-TERM CURRENT USE OF INSULIN: ICD-10-CM

## 2023-06-16 DIAGNOSIS — S06.33AA CEREBRAL CONTUSION: ICD-10-CM

## 2023-06-16 DIAGNOSIS — S06.6X9D SUBARACHNOID HEMORRHAGE FOLLOWING INJURY, WITH LOSS OF CONSCIOUSNESS, SUBSEQUENT ENCOUNTER: ICD-10-CM

## 2023-06-16 DIAGNOSIS — R51.9 NONINTRACTABLE HEADACHE, UNSPECIFIED CHRONICITY PATTERN, UNSPECIFIED HEADACHE TYPE: Primary | ICD-10-CM

## 2023-06-16 DIAGNOSIS — E11.65 TYPE 2 DIABETES MELLITUS WITH HYPERGLYCEMIA, WITH LONG-TERM CURRENT USE OF INSULIN: ICD-10-CM

## 2023-06-16 DIAGNOSIS — I62.9 INTRACRANIAL HEMORRHAGE: ICD-10-CM

## 2023-06-16 LAB
ALBUMIN SERPL BCP-MCNC: 3.3 G/DL (ref 3.5–5.2)
ALP SERPL-CCNC: 69 U/L (ref 55–135)
ALT SERPL W/O P-5'-P-CCNC: 10 U/L (ref 10–44)
ANION GAP SERPL CALC-SCNC: 7 MMOL/L (ref 8–16)
AST SERPL-CCNC: 16 U/L (ref 10–40)
BASOPHILS # BLD AUTO: 0.04 K/UL (ref 0–0.2)
BASOPHILS NFR BLD: 0.5 % (ref 0–1.9)
BILIRUB SERPL-MCNC: 0.4 MG/DL (ref 0.1–1)
BUN SERPL-MCNC: 21 MG/DL (ref 8–23)
CALCIUM SERPL-MCNC: 9.6 MG/DL (ref 8.7–10.5)
CHLORIDE SERPL-SCNC: 105 MMOL/L (ref 95–110)
CK SERPL-CCNC: 157 U/L (ref 20–200)
CO2 SERPL-SCNC: 24 MMOL/L (ref 23–29)
CREAT SERPL-MCNC: 1 MG/DL (ref 0.5–1.4)
DIFFERENTIAL METHOD: ABNORMAL
EOSINOPHIL # BLD AUTO: 0.2 K/UL (ref 0–0.5)
EOSINOPHIL NFR BLD: 2.1 % (ref 0–8)
ERYTHROCYTE [DISTWIDTH] IN BLOOD BY AUTOMATED COUNT: 13.7 % (ref 11.5–14.5)
EST. GFR  (NO RACE VARIABLE): >60 ML/MIN/1.73 M^2
GLUCOSE SERPL-MCNC: 151 MG/DL (ref 70–110)
HCT VFR BLD AUTO: 42.3 % (ref 40–54)
HGB BLD-MCNC: 14 G/DL (ref 14–18)
IMM GRANULOCYTES # BLD AUTO: 0.04 K/UL (ref 0–0.04)
IMM GRANULOCYTES NFR BLD AUTO: 0.5 % (ref 0–0.5)
LACTATE SERPL-SCNC: 1.8 MMOL/L (ref 0.5–2.2)
LYMPHOCYTES # BLD AUTO: 2 K/UL (ref 1–4.8)
LYMPHOCYTES NFR BLD: 26.3 % (ref 18–48)
MCH RBC QN AUTO: 30.1 PG (ref 27–31)
MCHC RBC AUTO-ENTMCNC: 33.1 G/DL (ref 32–36)
MCV RBC AUTO: 91 FL (ref 82–98)
MONOCYTES # BLD AUTO: 0.8 K/UL (ref 0.3–1)
MONOCYTES NFR BLD: 10.4 % (ref 4–15)
NEUTROPHILS # BLD AUTO: 4.6 K/UL (ref 1.8–7.7)
NEUTROPHILS NFR BLD: 60.2 % (ref 38–73)
NRBC BLD-RTO: 0 /100 WBC
PLATELET # BLD AUTO: 238 K/UL (ref 150–450)
PMV BLD AUTO: 9.1 FL (ref 9.2–12.9)
POTASSIUM SERPL-SCNC: 4.3 MMOL/L (ref 3.5–5.1)
PROT SERPL-MCNC: 7.6 G/DL (ref 6–8.4)
RBC # BLD AUTO: 4.65 M/UL (ref 4.6–6.2)
SODIUM SERPL-SCNC: 136 MMOL/L (ref 136–145)
WBC # BLD AUTO: 7.58 K/UL (ref 3.9–12.7)

## 2023-06-16 PROCEDURE — 83605 ASSAY OF LACTIC ACID: CPT | Performed by: EMERGENCY MEDICINE

## 2023-06-16 PROCEDURE — 96374 THER/PROPH/DIAG INJ IV PUSH: CPT

## 2023-06-16 PROCEDURE — 99285 EMERGENCY DEPT VISIT HI MDM: CPT | Mod: 25

## 2023-06-16 PROCEDURE — 80053 COMPREHEN METABOLIC PANEL: CPT | Performed by: EMERGENCY MEDICINE

## 2023-06-16 PROCEDURE — 96375 TX/PRO/DX INJ NEW DRUG ADDON: CPT

## 2023-06-16 PROCEDURE — 96361 HYDRATE IV INFUSION ADD-ON: CPT

## 2023-06-16 PROCEDURE — 82550 ASSAY OF CK (CPK): CPT | Performed by: EMERGENCY MEDICINE

## 2023-06-16 PROCEDURE — 25000003 PHARM REV CODE 250: Performed by: EMERGENCY MEDICINE

## 2023-06-16 PROCEDURE — 63600175 PHARM REV CODE 636 W HCPCS: Performed by: EMERGENCY MEDICINE

## 2023-06-16 PROCEDURE — 85025 COMPLETE CBC W/AUTO DIFF WBC: CPT | Performed by: EMERGENCY MEDICINE

## 2023-06-16 RX ORDER — METOCLOPRAMIDE HYDROCHLORIDE 5 MG/ML
10 INJECTION INTRAMUSCULAR; INTRAVENOUS
Status: COMPLETED | OUTPATIENT
Start: 2023-06-16 | End: 2023-06-16

## 2023-06-16 RX ORDER — PROCHLORPERAZINE EDISYLATE 5 MG/ML
10 INJECTION INTRAMUSCULAR; INTRAVENOUS
Status: COMPLETED | OUTPATIENT
Start: 2023-06-16 | End: 2023-06-16

## 2023-06-16 RX ORDER — BUTALBITAL, ACETAMINOPHEN AND CAFFEINE 50; 325; 40 MG/1; MG/1; MG/1
1 TABLET ORAL EVERY 6 HOURS PRN
Qty: 6 TABLET | Refills: 0 | Status: SHIPPED | OUTPATIENT
Start: 2023-06-16 | End: 2023-07-16

## 2023-06-16 RX ADMIN — METOCLOPRAMIDE 10 MG: 5 INJECTION, SOLUTION INTRAMUSCULAR; INTRAVENOUS at 01:06

## 2023-06-16 RX ADMIN — SODIUM CHLORIDE 1000 ML: 9 INJECTION, SOLUTION INTRAVENOUS at 01:06

## 2023-06-16 RX ADMIN — PROCHLORPERAZINE EDISYLATE 10 MG: 5 INJECTION INTRAMUSCULAR; INTRAVENOUS at 01:06

## 2023-06-17 NOTE — DISCHARGE SUMMARY
[facility] Medicine  Discharge Summary      Patient Name: Carlin Madrigal  MRN: [id number]  JORDAN: [id number]  Patient Class: IP- Inpatient  Admission Date: 6/13/2023  Hospital Length of Stay: 1 days  Discharge Date and Time: 6/15/2023  2:54 PM  Attending Physician: No att. providers found   Discharging Provider: Devang Del Cid MD  Primary Care Provider: [facility]    Primary Care Team: Networked reference to record PCT     HPI:   68 y.o. male with HTN, HLD, DM2, and hypothyroidism presents with a complaint of syncope.  Acute onset earlier today at work.  He works out in the heat and coworkers reported that he fell twice.  Reportedly confused and diaphoretic on scene.  Reported prodrome of dizziness.  Similar occurrence while working in the heat 2 weeks ago.  Relative reports he has been drinking a lot of cola and beer lately.  Denies fever, chills, cough, SOB, chest pain, palpitations, nausea, vomiting, diarrhea, or abdominal pain.  In the ED, labs reveal ABDIRIZAK, metabolic acidosis, and hyperkalemia.  IV fluids initiated.  Placed in observation.      * No surgery found *      Hospital Course:   Carlin Madrigal was placed under observation for management of ABDIRIZAK.    Throuhgout his observation stay, Mr. Madrigal was started on IV fluids, which allowed for resolution of his ABDIRIZAK noted on admission labs. Due to recurrent falls at work, CT Head was ordered which noted left frontal contusion with small foci of subarachnoid and parenchymal hemorrhage and minimal left subarachnoid help. General Neurology was consulted and recommended further neurosurgery evaluation of traumatic ICH. Patient continues to endorse severe back pain along with head pain, and PT/OT was consulted. PT evaluated the patient has recommended patient receive therapy while under observation and is without further recommendations upon discharge. Echocardiogram was ordered which showed normal LV/RV size and systolic function with  EF of 60%. Neurosurgery recommended repeat CT to ensure stability and MRI brain. Patient had multiple CT head which noted stability of hemorrhage and patient had no further symptoms and doing well. ON day of discharge, MRI was done and patient cleared for discharge by Neurosurgery. Will need to follow up in 2 weeks as outpatient. Discussed findings with patient and wife, all questions answered and patient discharged in stable condition.       Goals of Care Treatment Preferences:  Code Status: Full Code      Consults:   Consults (From admission, onward)        Status Ordering Provider     Inpatient consult to Neurosurgery  Once        Provider:  (Not yet assigned)    SHANON Martinez new Assessment & Plan notes have been filed under this hospital service since the last note was generated.  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 Call.    Why: To schedule hospital follow up  appointment within 7 days.  Contact information:  [address] [place] [address] [phone]                       Patient Instructions:      Diet diabetic     Notify your health care provider if you experience any of the following:  temperature >100.4     Notify your health care provider if you experience any of the following:  persistent nausea and vomiting or diarrhea     Notify your health care provider if you experience any of the  following:  severe uncontrolled pain     Notify your health care provider if you experience any of the following:  redness, tenderness, or signs of infection (pain, swelling, redness, odor or green/yellow discharge around incision site)     Notify your health care provider if you experience any of the following:  difficulty breathing or increased cough     Notify your health care provider if you experience any of the following:  severe persistent headache     Notify your health care provider if you experience any of the following:  worsening rash     Notify your health care provider if you experience any of the following:  persistent dizziness, light-headedness, or visual disturbances     Notify your health care provider if you experience any of the following:  increased confusion or weakness     Activity as tolerated       Significant Diagnostic Studies: N/A  X-Ray Thoracic Spine AP Lateral   Final Result      No acute process seen.      Chronic change as above.         Electronically signed by: Ryan Perea MD   Date:    06/15/2023   Time:    10:35      MRI Brain Without Contrast   Final Result      No mass effect or herniation seen.  There is no evidence of diffuse axonal injury or shear injury.      Multifocal hemorrhagic contusions, subarachnoid blood, and subdural blood without mass effect.         Electronically signed by: Ryan Perea MD   Date:    06/15/2023   Time:    10:34      CT Head Without Contrast   Final Result      1. No acute detrimental change relative prior head CT 06/14/2023, 16:38 hours.   2. Maturing bifrontal and left temporal lobe hemorrhagic contusions.  Similar relatively small volume subarachnoid hemorrhage and questioned thin parafalcine subdural blood essentially unchanged.   3. No new or enlarging areas of intracranial hemorrhage or worsening mass effect appreciated.         Electronically signed by: Alan Mcdonald   Date:    06/15/2023   Time:    08:05      CT Head Without  Contrast   Final Result   Abnormal      1. No significant change from the earlier study.   2. Inferior left frontal parenchymal contusion with mild associated edema and minimal parenchymal and subarachnoid hemorrhage.  Findings are similar to the prior study.  Recommend surveillance/follow-up.  See above comments.   3. Paranasal sinus disease.   4.  This report was flagged in Epic as abnormal.         Electronically signed by: Khoa Aldridge   Date:    06/14/2023   Time:    16:55      CT Lumbar Spine Without Contrast   Final Result      1. No acute abnormality.   2. Multilevel chronic degenerative changes.         Electronically signed by: Khoa Aldridge   Date:    06/14/2023   Time:    17:06      CT Head Without Contrast   Final Result      Inferior left frontal contusion with associated small foci of parenchymal and subarachnoid hemorrhage.  Some minimal additional subarachnoid hemorrhage is present along the right posterior frontoparietal region.      This critical information above was relayed by myself  by epic secure chat to Dr Perez on June 14, 2023 at 10 19.      CRITICAL FINDINGS:  Intracranial Hemorrhage      RECOMMENDATIONS:  Follow-up Imaging         Electronically signed by: Roxann Garcia MD   Date:    06/14/2023   Time:    10:20      X-Ray Lumbar Spine Ap And Lateral   Final Result      Degenerative changes         Electronically signed by: Roxann Garcia MD   Date:    06/14/2023   Time:    09:15      X-Ray Cervical Spine AP And Lateral   Final Result      Degenerative changes         Electronically signed by: Roxann Garcia MD   Date:    06/14/2023   Time:    09:17        Results for orders placed during the hospital encounter of 06/13/23    Echo Saline Bubble? Yes    Interpretation Summary  · The left ventricle is normal in size with normal systolic function.  · The estimated ejection fraction is 60%.  · Normal left ventricular diastolic function.  · Normal right ventricular size with normal right  ventricular systolic function.  · Normal central venous pressure (3 mmHg).  · There is no evidence of intracardiac shunting by bubble study    Pending Diagnostic Studies:     None         Medications:  Reconciled Home Medications:      Medication List      CONTINUE taking these medications    gabapentin 300 MG capsule  Commonly known as: NEURONTIN  Take 1 capsule by mouth 3 (three) times daily.     levothyroxine 50 MCG tablet  Commonly known as: SYNTHROID  Take 50 mcg by mouth Daily.     lisinopriL 40 MG tablet  Commonly known as: PRINIVIL,ZESTRIL  Take 40 mg by mouth Daily.     meclizine 25 mg tablet  Commonly known as: ANTIVERT  Take 1 tablet (25 mg total) by mouth 3 (three) times daily as needed for Dizziness.     metFORMIN 1000 MG tablet  Commonly known as: GLUCOPHAGE  Take 1,000 mg by mouth 2 (two) times a day.     NovoLIN 70/30 U-100 Insulin 100 unit/mL (70-30) injection  Generic drug: insulin NPH-insulin regular (70/30)  Inject 15 Units into the skin 2 (two) times a day.     pravastatin 20 MG tablet  Commonly known as: PRAVACHOL  Take 20 mg by mouth Daily.        STOP taking these medications    LANTUS SOLOSTAR U-100 INSULIN glargine 100 units/mL SubQ pen  Generic drug: insulin            Indwelling Lines/Drains at time of discharge:   Lines/Drains/Airways     None                 Time spent on the discharge of patient: >35 minutes         Devang Del Cid MD  Department of Hospital Medicine  [facility]

## 2023-06-18 ENCOUNTER — HOSPITAL ENCOUNTER (OUTPATIENT)
Facility: HOSPITAL | Age: 68
Discharge: HOME | End: 2023-06-19
Attending: EMERGENCY MEDICINE | Admitting: INTERNAL MEDICINE
Payer: OTHER MISCELLANEOUS

## 2023-06-18 DIAGNOSIS — J18.9 COMMUNITY ACQUIRED PNEUMONIA, UNSPECIFIED LATERALITY: ICD-10-CM

## 2023-06-18 DIAGNOSIS — E78.5 HYPERLIPIDEMIA, UNSPECIFIED HYPERLIPIDEMIA TYPE: Chronic | ICD-10-CM

## 2023-06-18 DIAGNOSIS — Z79.4 TYPE 2 DIABETES MELLITUS WITH DIABETIC POLYNEUROPATHY, WITH LONG-TERM CURRENT USE OF INSULIN: ICD-10-CM

## 2023-06-18 DIAGNOSIS — J06.9 UPPER RESPIRATORY INFECTION, ACUTE: ICD-10-CM

## 2023-06-18 DIAGNOSIS — I10 PRIMARY HYPERTENSION: Chronic | ICD-10-CM

## 2023-06-18 DIAGNOSIS — R79.89 ELEVATED TROPONIN: Primary | ICD-10-CM

## 2023-06-18 DIAGNOSIS — E03.9 HYPOTHYROIDISM, UNSPECIFIED TYPE: Chronic | ICD-10-CM

## 2023-06-18 DIAGNOSIS — E11.42 TYPE 2 DIABETES MELLITUS WITH DIABETIC POLYNEUROPATHY, WITH LONG-TERM CURRENT USE OF INSULIN: ICD-10-CM

## 2023-06-18 DIAGNOSIS — R53.83 FATIGUE: ICD-10-CM

## 2023-06-18 DIAGNOSIS — E87.1 HYPONATREMIA: ICD-10-CM

## 2023-06-18 PROBLEM — E83.42 HYPOMAGNESEMIA: Status: ACTIVE | Noted: 2023-06-18

## 2023-06-18 PROBLEM — R06.02 SHORTNESS OF BREATH: Status: ACTIVE | Noted: 2023-06-18

## 2023-06-18 PROBLEM — E83.42 HYPOMAGNESEMIA: Status: RESOLVED | Noted: 2023-06-18 | Resolved: 2023-06-18

## 2023-06-18 LAB
ALBUMIN SERPL BCP-MCNC: 3.4 G/DL (ref 3.5–5.2)
ALP SERPL-CCNC: 69 U/L (ref 55–135)
ALT SERPL W/O P-5'-P-CCNC: 11 U/L (ref 10–44)
ANION GAP SERPL CALC-SCNC: 10 MMOL/L (ref 8–16)
AST SERPL-CCNC: 15 U/L (ref 10–40)
BACTERIA #/AREA URNS HPF: NORMAL /HPF
BASOPHILS # BLD AUTO: 0.04 K/UL (ref 0–0.2)
BASOPHILS NFR BLD: 0.4 % (ref 0–1.9)
BILIRUB SERPL-MCNC: 0.7 MG/DL (ref 0.1–1)
BILIRUB UR QL STRIP: NEGATIVE
BNP SERPL-MCNC: 92 PG/ML (ref 0–99)
BUN SERPL-MCNC: 23 MG/DL (ref 8–23)
CALCIUM SERPL-MCNC: 9.4 MG/DL (ref 8.7–10.5)
CHLORIDE SERPL-SCNC: 95 MMOL/L (ref 95–110)
CK SERPL-CCNC: 92 U/L (ref 20–200)
CLARITY UR: CLEAR
CO2 SERPL-SCNC: 26 MMOL/L (ref 23–29)
COLOR UR: YELLOW
CREAT SERPL-MCNC: 1.2 MG/DL (ref 0.5–1.4)
CTP QC/QA: YES
CTP QC/QA: YES
DIFFERENTIAL METHOD: ABNORMAL
EOSINOPHIL # BLD AUTO: 0.1 K/UL (ref 0–0.5)
EOSINOPHIL NFR BLD: 0.7 % (ref 0–8)
ERYTHROCYTE [DISTWIDTH] IN BLOOD BY AUTOMATED COUNT: 13.3 % (ref 11.5–14.5)
EST. GFR  (NO RACE VARIABLE): >60 ML/MIN/1.73 M^2
GLUCOSE SERPL-MCNC: 171 MG/DL (ref 70–110)
GLUCOSE UR QL STRIP: ABNORMAL
HCT VFR BLD AUTO: 43.4 % (ref 40–54)
HGB BLD-MCNC: 14.7 G/DL (ref 14–18)
HGB UR QL STRIP: ABNORMAL
HYALINE CASTS #/AREA URNS LPF: 0 /LPF
IMM GRANULOCYTES # BLD AUTO: 0.03 K/UL (ref 0–0.04)
IMM GRANULOCYTES NFR BLD AUTO: 0.3 % (ref 0–0.5)
KETONES UR QL STRIP: NEGATIVE
LEUKOCYTE ESTERASE UR QL STRIP: NEGATIVE
LYMPHOCYTES # BLD AUTO: 2.6 K/UL (ref 1–4.8)
LYMPHOCYTES NFR BLD: 26.3 % (ref 18–48)
MAGNESIUM SERPL-MCNC: 1.6 MG/DL (ref 1.6–2.6)
MCH RBC QN AUTO: 30.4 PG (ref 27–31)
MCHC RBC AUTO-ENTMCNC: 33.9 G/DL (ref 32–36)
MCV RBC AUTO: 90 FL (ref 82–98)
MICROSCOPIC COMMENT: NORMAL
MONOCYTES # BLD AUTO: 1.1 K/UL (ref 0.3–1)
MONOCYTES NFR BLD: 10.7 % (ref 4–15)
NEUTROPHILS # BLD AUTO: 6.2 K/UL (ref 1.8–7.7)
NEUTROPHILS NFR BLD: 61.6 % (ref 38–73)
NITRITE UR QL STRIP: NEGATIVE
NRBC BLD-RTO: 0 /100 WBC
PH UR STRIP: 6 [PH] (ref 5–8)
PLATELET # BLD AUTO: 292 K/UL (ref 150–450)
PMV BLD AUTO: 9 FL (ref 9.2–12.9)
POC MOLECULAR INFLUENZA A AGN: NEGATIVE
POC MOLECULAR INFLUENZA B AGN: NEGATIVE
POCT GLUCOSE: 141 MG/DL (ref 70–110)
POCT GLUCOSE: 178 MG/DL (ref 70–110)
POTASSIUM SERPL-SCNC: 4.3 MMOL/L (ref 3.5–5.1)
PROT SERPL-MCNC: 7.9 G/DL (ref 6–8.4)
PROT UR QL STRIP: ABNORMAL
RBC # BLD AUTO: 4.84 M/UL (ref 4.6–6.2)
RBC #/AREA URNS HPF: 3 /HPF (ref 0–4)
SARS-COV-2 RDRP RESP QL NAA+PROBE: NEGATIVE
SODIUM SERPL-SCNC: 131 MMOL/L (ref 136–145)
SP GR UR STRIP: 1.03 (ref 1–1.03)
TROPONIN I SERPL DL<=0.01 NG/ML-MCNC: 0.04 NG/ML (ref 0–0.03)
TROPONIN I SERPL DL<=0.01 NG/ML-MCNC: 0.05 NG/ML (ref 0–0.03)
TSH SERPL DL<=0.005 MIU/L-ACNC: 1.39 UIU/ML (ref 0.4–4)
URN SPEC COLLECT METH UR: ABNORMAL
UROBILINOGEN UR STRIP-ACNC: ABNORMAL EU/DL
WBC # BLD AUTO: 9.99 K/UL (ref 3.9–12.7)
WBC #/AREA URNS HPF: 1 /HPF (ref 0–5)

## 2023-06-18 PROCEDURE — 83880 ASSAY OF NATRIURETIC PEPTIDE: CPT | Performed by: EMERGENCY MEDICINE

## 2023-06-18 PROCEDURE — 83735 ASSAY OF MAGNESIUM: CPT | Performed by: EMERGENCY MEDICINE

## 2023-06-18 PROCEDURE — 25000003 PHARM REV CODE 250: Performed by: STUDENT IN AN ORGANIZED HEALTH CARE EDUCATION/TRAINING PROGRAM

## 2023-06-18 PROCEDURE — 63600175 PHARM REV CODE 636 W HCPCS: Performed by: STUDENT IN AN ORGANIZED HEALTH CARE EDUCATION/TRAINING PROGRAM

## 2023-06-18 PROCEDURE — 81000 URINALYSIS NONAUTO W/SCOPE: CPT | Performed by: EMERGENCY MEDICINE

## 2023-06-18 PROCEDURE — 93010 ELECTROCARDIOGRAM REPORT: CPT | Mod: ,,, | Performed by: INTERNAL MEDICINE

## 2023-06-18 PROCEDURE — 85025 COMPLETE CBC W/AUTO DIFF WBC: CPT | Performed by: EMERGENCY MEDICINE

## 2023-06-18 PROCEDURE — 96365 THER/PROPH/DIAG IV INF INIT: CPT

## 2023-06-18 PROCEDURE — G0378 HOSPITAL OBSERVATION PER HR: HCPCS

## 2023-06-18 PROCEDURE — 80053 COMPREHEN METABOLIC PANEL: CPT | Performed by: EMERGENCY MEDICINE

## 2023-06-18 PROCEDURE — 84484 ASSAY OF TROPONIN QUANT: CPT | Performed by: STUDENT IN AN ORGANIZED HEALTH CARE EDUCATION/TRAINING PROGRAM

## 2023-06-18 PROCEDURE — 84443 ASSAY THYROID STIM HORMONE: CPT | Performed by: EMERGENCY MEDICINE

## 2023-06-18 PROCEDURE — 25000003 PHARM REV CODE 250: Performed by: EMERGENCY MEDICINE

## 2023-06-18 PROCEDURE — 82962 GLUCOSE BLOOD TEST: CPT

## 2023-06-18 PROCEDURE — 82550 ASSAY OF CK (CPK): CPT | Performed by: EMERGENCY MEDICINE

## 2023-06-18 PROCEDURE — 93010 EKG 12-LEAD: ICD-10-PCS | Mod: ,,, | Performed by: INTERNAL MEDICINE

## 2023-06-18 PROCEDURE — 87635 SARS-COV-2 COVID-19 AMP PRB: CPT | Performed by: EMERGENCY MEDICINE

## 2023-06-18 PROCEDURE — 99285 EMERGENCY DEPT VISIT HI MDM: CPT | Mod: 25

## 2023-06-18 PROCEDURE — 94761 N-INVAS EAR/PLS OXIMETRY MLT: CPT

## 2023-06-18 PROCEDURE — 96367 TX/PROPH/DG ADDL SEQ IV INF: CPT

## 2023-06-18 PROCEDURE — 93005 ELECTROCARDIOGRAM TRACING: CPT

## 2023-06-18 PROCEDURE — 36415 COLL VENOUS BLD VENIPUNCTURE: CPT | Performed by: STUDENT IN AN ORGANIZED HEALTH CARE EDUCATION/TRAINING PROGRAM

## 2023-06-18 PROCEDURE — 84484 ASSAY OF TROPONIN QUANT: CPT | Mod: 91 | Performed by: EMERGENCY MEDICINE

## 2023-06-18 PROCEDURE — 96372 THER/PROPH/DIAG INJ SC/IM: CPT | Mod: 59 | Performed by: STUDENT IN AN ORGANIZED HEALTH CARE EDUCATION/TRAINING PROGRAM

## 2023-06-18 PROCEDURE — 87502 INFLUENZA DNA AMP PROBE: CPT

## 2023-06-18 RX ORDER — GLUCAGON 1 MG
1 KIT INJECTION
Status: DISCONTINUED | OUTPATIENT
Start: 2023-06-18 | End: 2023-06-19 | Stop reason: HOSPADM

## 2023-06-18 RX ORDER — SODIUM CHLORIDE 9 MG/ML
500 INJECTION, SOLUTION INTRAVENOUS
Status: COMPLETED | OUTPATIENT
Start: 2023-06-18 | End: 2023-06-18

## 2023-06-18 RX ORDER — LISINOPRIL 20 MG/1
40 TABLET ORAL DAILY
Status: DISCONTINUED | OUTPATIENT
Start: 2023-06-19 | End: 2023-06-19 | Stop reason: HOSPADM

## 2023-06-18 RX ORDER — LEVOTHYROXINE SODIUM 50 UG/1
50 TABLET ORAL
Status: DISCONTINUED | OUTPATIENT
Start: 2023-06-19 | End: 2023-06-19 | Stop reason: HOSPADM

## 2023-06-18 RX ORDER — SODIUM CHLORIDE 0.9 % (FLUSH) 0.9 %
10 SYRINGE (ML) INJECTION
Status: DISCONTINUED | OUTPATIENT
Start: 2023-06-18 | End: 2023-06-19 | Stop reason: HOSPADM

## 2023-06-18 RX ORDER — AZITHROMYCIN 250 MG/1
500 TABLET, FILM COATED ORAL DAILY
Status: DISCONTINUED | OUTPATIENT
Start: 2023-06-19 | End: 2023-06-19 | Stop reason: HOSPADM

## 2023-06-18 RX ORDER — ACETAMINOPHEN 500 MG
1000 TABLET ORAL EVERY 6 HOURS PRN
Status: DISCONTINUED | OUTPATIENT
Start: 2023-06-18 | End: 2023-06-19

## 2023-06-18 RX ORDER — TALC
6 POWDER (GRAM) TOPICAL NIGHTLY PRN
Status: DISCONTINUED | OUTPATIENT
Start: 2023-06-18 | End: 2023-06-19 | Stop reason: HOSPADM

## 2023-06-18 RX ORDER — DEXTROSE 40 %
15 GEL (GRAM) ORAL
Status: DISCONTINUED | OUTPATIENT
Start: 2023-06-18 | End: 2023-06-19 | Stop reason: HOSPADM

## 2023-06-18 RX ORDER — DEXTROSE 40 %
30 GEL (GRAM) ORAL
Status: DISCONTINUED | OUTPATIENT
Start: 2023-06-18 | End: 2023-06-19 | Stop reason: HOSPADM

## 2023-06-18 RX ORDER — PRAVASTATIN SODIUM 10 MG/1
20 TABLET ORAL NIGHTLY
Status: DISCONTINUED | OUTPATIENT
Start: 2023-06-18 | End: 2023-06-19 | Stop reason: HOSPADM

## 2023-06-18 RX ORDER — INSULIN ASPART 100 [IU]/ML
0-5 INJECTION, SOLUTION INTRAVENOUS; SUBCUTANEOUS
Status: DISCONTINUED | OUTPATIENT
Start: 2023-06-18 | End: 2023-06-19 | Stop reason: HOSPADM

## 2023-06-18 RX ORDER — GABAPENTIN 300 MG/1
300 CAPSULE ORAL 3 TIMES DAILY
Status: DISCONTINUED | OUTPATIENT
Start: 2023-06-18 | End: 2023-06-19 | Stop reason: HOSPADM

## 2023-06-18 RX ORDER — MAGNESIUM SULFATE HEPTAHYDRATE 40 MG/ML
2 INJECTION, SOLUTION INTRAVENOUS ONCE
Status: COMPLETED | OUTPATIENT
Start: 2023-06-18 | End: 2023-06-19

## 2023-06-18 RX ADMIN — SODIUM CHLORIDE 500 ML: 0.9 INJECTION, SOLUTION INTRAVENOUS at 06:06

## 2023-06-18 RX ADMIN — CEFTRIAXONE 1 G: 1 INJECTION, POWDER, FOR SOLUTION INTRAMUSCULAR; INTRAVENOUS at 10:06

## 2023-06-18 RX ADMIN — INSULIN DETEMIR 10 UNITS: 100 INJECTION, SOLUTION SUBCUTANEOUS at 10:06

## 2023-06-18 RX ADMIN — GABAPENTIN 300 MG: 300 CAPSULE ORAL at 10:06

## 2023-06-18 RX ADMIN — ACETAMINOPHEN 1000 MG: 500 TABLET ORAL at 10:06

## 2023-06-18 RX ADMIN — MAGNESIUM SULFATE 2 G: 2 INJECTION INTRAVENOUS at 11:06

## 2023-06-18 RX ADMIN — PRAVASTATIN SODIUM 20 MG: 10 TABLET ORAL at 10:06

## 2023-06-18 RX ADMIN — Medication 6 MG: at 10:06

## 2023-06-18 NOTE — ED NOTES
Patient arrived to ED with family with complaints of fever x 1 day.   Recently hospitalized for traumatic brain injury from a fall. Discharged from hospitalized. Went back to hospital on 6/16 with hyperglycemia. Treated and discharged.   Family reports last night fever started. Tmax of 102. Treated with tylenol. Fever would return 3-4 hours later and treated with tylenol.  Last dose of Tylenol around 1200 today.  Family reports patient is more tired today, not wanting to get out of bed and eat. Decreased PO intake today. Reports patient still needing help with mobility after TBI, a little more today than prior.   Patient alert, oriented, GCS of 15. Pupils equal and reactive. Moving all extremities.  No edema.  Patient denies pain, CP, SOB, trouble breathing, abdominal pian, NVD.   Family noticed that patient seems to be trying to cough frequently.    Family reports that during hospitalizations he was never intubated nor had a catheter.       APPEARANCE: Alert, oriented and in no acute distress.  CARDIAC: Normal rate and rhythm, no murmur heard.   PERIPHERAL VASCULAR: peripheral pulses present. Normal cap refill. No edema. Warm to touch.    RESPIRATORY:Normal rate and effort, breath sounds clear and diminished bilaterally throughout chest. Respirations are equal and unlabored no obvious signs of distress.  GASTRO: soft, bowel sounds normal, no tenderness, no abdominal distention.  MUSC: generalized weakness. No bony tenderness or soft tissue tenderness. No obvious deformity.  SKIN: Skin is warm and dry, normal skin turgor, mucous membranes moist.  NEURO: 5/5 strength major flexors/extensors bilaterally. Sensory intact to light touch bilaterally. [place] coma scale: eyes open spontaneously-4, oriented & converses-5, obeys commands-6. No neurological abnormalities.   MENTAL STATUS: awake, alert and aware of environment.  EYE: PERRL, both eyes: pupils brisk and reactive to light. Normal size.

## 2023-06-18 NOTE — ED NOTES
RN at bedside. IV fluids infusing.   Patient denies pain and chest pain.   RN got patient to side of bed. Patient ambulated a few steps. Very unsteady and wobbling. RN needing to hold onto patient to assist with ambulation.

## 2023-06-18 NOTE — ED PROVIDER NOTES
Encounter Date: 6/18/2023       History     Chief Complaint   Patient presents with    Fatigue     Lethargy and decreased appetite for several days. Has had a fever for several days. Recent brain injury. Alert and responding appropriately.      68-year-old male with a pmhx of DM2 who presents to the ED with the complaint of fever, lethargy x 2 days.  Daughter at bedside reports pt requires help to get out of a chair which is not his baseline.  He denies any cough, sore throat, chest pain, SOB, abd pain, nausea or vomiting.  Pt was recently admitted to [facility] for syncope, fall and SDH. He was there for 2 days and was discharged.  Pt was seen in this ED the next day for headache. Repeat CTH demonstrated no new findings.  Pt currently denies any HA, neck pain/neck stiffness. He denies any bowel or bladder changes.     Review of patient's allergies indicates:  No Known Allergies  Past Medical History:   Diagnosis Date    Diabetes mellitus     Hypertension      No past surgical history on file.  No family history on file.  Social History     Tobacco Use    Smoking status: Never    Smokeless tobacco: Never   Substance Use Topics    Alcohol use: Yes     Comment: 06/13/23: occ    Drug use: Not Currently     Review of Systems   Constitutional:  Positive for activity change, fatigue and fever. Negative for appetite change and chills.   Eyes:  Negative for photophobia and visual disturbance.   Respiratory:  Negative for chest tightness and shortness of breath.    Cardiovascular:  Negative for chest pain, palpitations and leg swelling.   Gastrointestinal:  Negative for abdominal pain, nausea and vomiting.   Genitourinary:  Negative for dysuria, flank pain and frequency.   Musculoskeletal:  Negative for back pain and myalgias.   Neurological:  Negative for dizziness, seizures, syncope, speech difficulty, light-headedness and headaches.   Psychiatric/Behavioral:  Negative for agitation and confusion.      Physical Exam      Initial Vitals [06/18/23 1544]   BP Pulse Resp Temp SpO2   137/75 106 16 98.7 °F (37.1 °C) 97 %      MAP       --         Physical Exam    Nursing note and vitals reviewed.  Constitutional: He appears well-developed and well-nourished. He is not diaphoretic. No distress.   HENT:   Head: Normocephalic and atraumatic.   Right Ear: External ear normal.   Left Ear: External ear normal.   Eyes: Conjunctivae and EOM are normal. Pupils are equal, round, and reactive to light.   Neck: Neck supple.   Normal range of motion.  Cardiovascular:  Normal rate, regular rhythm, normal heart sounds and intact distal pulses.           Pulmonary/Chest: Breath sounds normal.   Abdominal: Abdomen is soft. Bowel sounds are normal.   Musculoskeletal:         General: Normal range of motion.      Cervical back: Normal range of motion and neck supple.     Neurological: He is alert and oriented to person, place, and time. He has normal strength. GCS score is 15. GCS eye subscore is 4. GCS verbal subscore is 5. GCS motor subscore is 6.   Skin: Skin is warm. Capillary refill takes less than 2 seconds.   Psychiatric: He has a normal mood and affect.       ED Course   Procedures  Labs Reviewed   CBC W/ AUTO DIFFERENTIAL - Abnormal; Notable for the following components:       Result Value    MPV 9.0 (*)     Mono # 1.1 (*)     All other components within normal limits   COMPREHENSIVE METABOLIC PANEL - Abnormal; Notable for the following components:    Sodium 131 (*)     Glucose 171 (*)     Albumin 3.4 (*)     All other components within normal limits   URINALYSIS, REFLEX TO URINE CULTURE - Abnormal; Notable for the following components:    Protein, UA 1+ (*)     Glucose, UA 1+ (*)     Occult Blood UA 1+ (*)     Urobilinogen, UA 4.0-6.0 (*)     All other components within normal limits    Narrative:     Specimen Source->Urine   TROPONIN I - Abnormal; Notable for the following components:    Troponin I 0.039 (*)     All other components within  normal limits   POCT GLUCOSE - Abnormal; Notable for the following components:    POCT Glucose 178 (*)     All other components within normal limits   POCT GLUCOSE - Abnormal; Notable for the following components:    POCT Glucose 141 (*)     All other components within normal limits   CK   TSH   TROPONIN I   MAGNESIUM   B-TYPE NATRIURETIC PEPTIDE   MAGNESIUM   B-TYPE NATRIURETIC PEPTIDE   URINALYSIS MICROSCOPIC    Narrative:     Specimen Source->Urine   SARS-COV-2 RDRP GENE    Narrative:     This test utilizes isothermal nucleic acid amplification technology to detect the SARS-CoV-2 RdRp nucleic acid segment. The analytical sensitivity (limit of detection) is 500 copies/swab.     A POSITIVE result is indicative of the presence of SARS-CoV-2 RNA; clinical correlation with patient history and other diagnostic information is necessary to determine patient infection status.    A NEGATIVE result means that SARS-CoV-2 nucleic acids are not present above the limit of detection. A NEGATIVE result should be treated as presumptive. It does not rule out the possibility of COVID-19 and should not be the sole basis for treatment decisions. If COVID-19 is strongly suspected based on clinical and exposure history, re-testing using an alternate molecular assay should be considered.     This test is only for use under the Food and Drug Administration s Emergency Use Authorization (EUA).     Commercial kits are provided by [employer]. Performance characteristics of the EUA have been independently verified by [facility] Department of Pathology and Laboratory Medicine.   _________________________________________________________________   The authorized Fact Sheet for Healthcare Providers and the authorized Fact Sheet for Patients of the ID NOW COVID-19 are available on the FDA website:    https://www.fda.gov/media/901700/download      https://www.fda.gov/media/370210/download       POCT INFLUENZA A/B MOLECULAR    POCT GLUCOSE MONITORING CONTINUOUS     EKG Readings: (Independently Interpreted)   Initial Reading: No STEMI. Heart Rate: 92. Conduction: RBBB. ST Segments: Normal ST Segments. T Waves: Normal.   Normal sinus rhythm right bundle branch block; EKG seems unchanged compared to most recent.  No STEMI     Imaging Results              X-Ray Chest PA And Lateral (In process)  Result time 06/18/23 21:10:28                     X-Ray Chest AP Portable (Final result)  Result time 06/18/23 17:02:28      Final result by Sydni Nagy MD (06/18/23 17:02:28)                   Impression:      No acute abnormality.      Electronically signed by: Sydni Nagy MD  Date:    06/18/2023  Time:    17:02               Narrative:    EXAMINATION:  XR CHEST AP PORTABLE    CLINICAL HISTORY:  Other fatigue    TECHNIQUE:  Single frontal view of the chest was performed.    COMPARISON:  None    FINDINGS:  The lungs are mildly under expanded with crowding of the pulmonary vascularity.No consolidation.  No pleural effusion. No evident pneumothorax.    The cardiac silhouette is normal in size. The hilar and mediastinal contours are unremarkable.    Bones are intact.                                       Medications   levothyroxine tablet 50 mcg (has no administration in time range)   gabapentin capsule 300 mg (has no administration in time range)   lisinopriL tablet 40 mg (has no administration in time range)   glucagon (human recombinant) injection 1 mg (has no administration in time range)   dextrose 10% bolus 125 mL 125 mL (has no administration in time range)   dextrose 10% bolus 250 mL 250 mL (has no administration in time range)   dextrose 40 % gel 15,000 mg (has no administration in time range)   dextrose 40 % gel 30,000 mg (has no administration in time range)   insulin aspart U-100 pen 0-5 Units (has no administration in time range)   insulin detemir U-100 (Levemir) pen 10 Units (has no administration in time range)   pravastatin  tablet 20 mg (has no administration in time range)   sodium chloride 0.9% flush 10 mL (has no administration in time range)   melatonin tablet 6 mg (has no administration in time range)   cefTRIAXone (ROCEPHIN) 1 g in dextrose 5 % in water (D5W) 5 % 100 mL IVPB (MB+) (has no administration in time range)   azithromycin tablet 500 mg (has no administration in time range)   magnesium sulfate 2g in water 50mL IVPB (premix) (has no administration in time range)   0.9%  NaCl infusion (500 mLs Intravenous New Bag 6/18/23 1800)     Medical Decision Making:   History:   I obtained history from: someone other than patient.       <> Summary of History: Daughter at bedside; See HPI   Old Records Summarized: records from previous admission(s).       <> Summary of Records: See HPI   Initial Assessment:   68-year-old male with a past history of diabetes presents ED complaint of generalized fatigue, subjective fever for the past 2 days.  Differential Diagnosis:   Viral syndrome, dehydration, influenza, COVID 19, electrolyte abnormality, thyroid dysfunction, UTI   Clinical Tests:   Lab Tests: Reviewed and Ordered  Radiological Study: Ordered and Reviewed  ED Management:  - labs unremarkable other than elevated troponin   - will admit to [facility] for fatigue, elevated troponin   - pt denies HA or any neurological complaints at this time              ED Course as of 06/18/23 2110   Sun Jun 18, 2023 1711 X-Ray Chest AP Portable  No acute cardiopulmonary process per my independent interpretation.  [LC]   1711 SARS-CoV-2 RNA, Amplification, Qual: Negative  Reviewed by self - WNL.  [LC]   1711 POC Molecular Influenza A Ag: Negative  Reviewed by self - WNL  [LC]   1712 WBC: 9.99  Reviewed by self - WNL  [LC]   1712 Hemoglobin: 14.7  Reviewed by self - WNL  [LC]   1712 Hematocrit: 43.4  Reviewed by self - WNL  [LC]   1743 Troponin I(!): 0.039  Reviewed by self - elevated.  [LC]      ED Course User Index  [] Robby Castillo MD                    Clinical Impression:   Final diagnoses:  [R53.83] Fatigue  [R77.8] Elevated troponin (Primary)        ED Disposition Condition    Observation Stable                Robby Castillo MD  06/18/23 3936

## 2023-06-18 NOTE — ED NOTES
Patient resting comfortably in bed. Denies pain. A/o x 4. Reports he still does not have to void. Voided prior to arrival.

## 2023-06-18 NOTE — PHARMACY MED REC
"    [facility]           Pharmacy  Admission Medication History     The home medication history was taken by Michelle Wintson.      Medication history obtained from Medications listed below were obtained from: Patient/family    Based on information gathered for medication list, you may go to "Admission" then "Reconcile Home Medications" tabs to review and/or act upon those items.     The home medication list has been updated by the Pharmacy department.   Please read ALL comments highlighted in yellow.   Please address this information as you see fit.    Feel free to contact us if you have any questions or require assistance.    The medications listed below were removed from the home medication list.  Please reorder if appropriate:    Patient reports NOT TAKING the following medication(s):  Antivert 25mg        No current facility-administered medications on file prior to encounter.     Current Outpatient Medications on File Prior to Encounter   Medication Sig Dispense Refill    butalbital-acetaminophen-caffeine -40 mg (FIORICET, ESGIC) -40 mg per tablet Take 1 tablet by mouth every 6 (six) hours as needed for Pain or Headaches. 6 tablet 0    gabapentin (NEURONTIN) 300 MG capsule Take 300 mg by mouth 3 (three) times daily.      lisinopriL (PRINIVIL,ZESTRIL) 40 MG tablet Take 40 mg by mouth Daily.      metFORMIN (GLUCOPHAGE) 1000 MG tablet Take 1,000 mg by mouth 2 (two) times a day.      NOVOLIN 70/30 U-100 INSULIN 100 unit/mL (70-30) injection Inject 15 Units into the skin 2 (two) times a day.      pravastatin (PRAVACHOL) 20 MG tablet Take 20 mg by mouth Daily.      levothyroxine (SYNTHROID) 50 MCG tablet Take 50 mcg by mouth Daily.         Please address this information as you see fit.  Feel free to contact us if you have any questions or require assistance.    Michelle Winston  [phone]              .        "

## 2023-06-19 VITALS
WEIGHT: 193.56 LBS | DIASTOLIC BLOOD PRESSURE: 75 MMHG | OXYGEN SATURATION: 95 % | SYSTOLIC BLOOD PRESSURE: 135 MMHG | TEMPERATURE: 99 F | RESPIRATION RATE: 18 BRPM | HEIGHT: 71 IN | BODY MASS INDEX: 27.1 KG/M2 | HEART RATE: 70 BPM

## 2023-06-19 PROBLEM — J06.9 UPPER RESPIRATORY INFECTION, ACUTE: Status: ACTIVE | Noted: 2023-06-19

## 2023-06-19 LAB
ANION GAP SERPL CALC-SCNC: 13 MMOL/L (ref 8–16)
BUN SERPL-MCNC: 21 MG/DL (ref 8–23)
CALCIUM SERPL-MCNC: 9.1 MG/DL (ref 8.7–10.5)
CHLORIDE SERPL-SCNC: 99 MMOL/L (ref 95–110)
CO2 SERPL-SCNC: 21 MMOL/L (ref 23–29)
CREAT SERPL-MCNC: 1 MG/DL (ref 0.5–1.4)
EST. GFR  (NO RACE VARIABLE): >60 ML/MIN/1.73 M^2
GLUCOSE SERPL-MCNC: 168 MG/DL (ref 70–110)
POCT GLUCOSE: 138 MG/DL (ref 70–110)
POTASSIUM SERPL-SCNC: 4 MMOL/L (ref 3.5–5.1)
SODIUM SERPL-SCNC: 133 MMOL/L (ref 136–145)
TROPONIN I SERPL DL<=0.01 NG/ML-MCNC: 0.03 NG/ML (ref 0–0.03)

## 2023-06-19 PROCEDURE — 99900035 HC TECH TIME PER 15 MIN (STAT)

## 2023-06-19 PROCEDURE — 36415 COLL VENOUS BLD VENIPUNCTURE: CPT | Performed by: STUDENT IN AN ORGANIZED HEALTH CARE EDUCATION/TRAINING PROGRAM

## 2023-06-19 PROCEDURE — 84484 ASSAY OF TROPONIN QUANT: CPT | Performed by: STUDENT IN AN ORGANIZED HEALTH CARE EDUCATION/TRAINING PROGRAM

## 2023-06-19 PROCEDURE — 93010 ELECTROCARDIOGRAM REPORT: CPT | Mod: ,,, | Performed by: INTERNAL MEDICINE

## 2023-06-19 PROCEDURE — 97116 GAIT TRAINING THERAPY: CPT

## 2023-06-19 PROCEDURE — G0378 HOSPITAL OBSERVATION PER HR: HCPCS

## 2023-06-19 PROCEDURE — 93010 EKG 12-LEAD: ICD-10-PCS | Mod: ,,, | Performed by: INTERNAL MEDICINE

## 2023-06-19 PROCEDURE — 63700000 PHARM REV CODE 250 ALT 637 W/O HCPCS: Performed by: STUDENT IN AN ORGANIZED HEALTH CARE EDUCATION/TRAINING PROGRAM

## 2023-06-19 PROCEDURE — 80048 BASIC METABOLIC PNL TOTAL CA: CPT | Performed by: STUDENT IN AN ORGANIZED HEALTH CARE EDUCATION/TRAINING PROGRAM

## 2023-06-19 PROCEDURE — 94761 N-INVAS EAR/PLS OXIMETRY MLT: CPT

## 2023-06-19 PROCEDURE — 93005 ELECTROCARDIOGRAM TRACING: CPT

## 2023-06-19 PROCEDURE — 25000003 PHARM REV CODE 250

## 2023-06-19 PROCEDURE — 25000003 PHARM REV CODE 250: Performed by: STUDENT IN AN ORGANIZED HEALTH CARE EDUCATION/TRAINING PROGRAM

## 2023-06-19 PROCEDURE — 97162 PT EVAL MOD COMPLEX 30 MIN: CPT

## 2023-06-19 PROCEDURE — 96366 THER/PROPH/DIAG IV INF ADDON: CPT

## 2023-06-19 RX ORDER — AZITHROMYCIN 500 MG/1
500 TABLET, FILM COATED ORAL DAILY
Qty: 2 TABLET | Refills: 0 | Status: SHIPPED | OUTPATIENT
Start: 2023-06-20 | End: 2023-06-19 | Stop reason: HOSPADM

## 2023-06-19 RX ORDER — LEVOFLOXACIN 750 MG/1
750 TABLET ORAL DAILY
Qty: 4 TABLET | Refills: 0 | Status: SHIPPED | OUTPATIENT
Start: 2023-06-19 | End: 2023-06-23

## 2023-06-19 RX ORDER — ACETAMINOPHEN 500 MG
1000 TABLET ORAL EVERY 6 HOURS PRN
Status: DISCONTINUED | OUTPATIENT
Start: 2023-06-19 | End: 2023-06-19 | Stop reason: HOSPADM

## 2023-06-19 RX ORDER — AMOXICILLIN AND CLAVULANATE POTASSIUM 875; 125 MG/1; MG/1
1 TABLET, FILM COATED ORAL 2 TIMES DAILY
Qty: 4 TABLET | Refills: 0 | Status: SHIPPED | OUTPATIENT
Start: 2023-06-19 | End: 2023-06-19 | Stop reason: HOSPADM

## 2023-06-19 RX ORDER — AMLODIPINE BESYLATE 5 MG/1
5 TABLET ORAL DAILY
Qty: 30 TABLET | Refills: 1 | Status: SHIPPED | OUTPATIENT
Start: 2023-06-20 | End: 2023-09-11

## 2023-06-19 RX ORDER — AMLODIPINE BESYLATE 5 MG/1
5 TABLET ORAL DAILY
Status: DISCONTINUED | OUTPATIENT
Start: 2023-06-19 | End: 2023-06-19 | Stop reason: HOSPADM

## 2023-06-19 RX ADMIN — INSULIN DETEMIR 10 UNITS: 100 INJECTION, SOLUTION SUBCUTANEOUS at 08:06

## 2023-06-19 RX ADMIN — LEVOTHYROXINE SODIUM 50 MCG: 50 TABLET ORAL at 05:06

## 2023-06-19 RX ADMIN — AMLODIPINE BESYLATE 5 MG: 5 TABLET ORAL at 08:06

## 2023-06-19 RX ADMIN — LISINOPRIL 40 MG: 20 TABLET ORAL at 08:06

## 2023-06-19 RX ADMIN — GABAPENTIN 300 MG: 300 CAPSULE ORAL at 08:06

## 2023-06-19 RX ADMIN — AZITHROMYCIN MONOHYDRATE 500 MG: 250 TABLET ORAL at 08:06

## 2023-06-19 NOTE — PROGRESS NOTES
06/18/23 2212   Patient Request   Patient Requested melatonin and tylenol   Nurse Notification   Bedside Nurse Notified? Yes   Name of Bedside Nurse KATYA Cr   Nurse Notfication Method Secure Chat   Nurse Notified Of Patient Request;Other  (pt c/o headache)   Provider Notification   Provider Notified? Yes   Name of Provider Dr Mullen   Provider Notification Method Telephone   Provider Notified Of Other (See Comment)  (pt c/o HA, bp 178/88 and daughter's concern of elevated bp's; pt's daughter inquiring if another troponin level will be done)   Admission   Initial VN Admission Questions Complete   Shift   Virtual Nurse - Patient Verbalized Approval Of Camera Use;VN Rounding   Safety/Activity   Patient Rounds bed in low position;call light in patient/parent reach;clutter free environment maintained;visualized patient;placement of personal items at bedside   Safety Promotion/Fall Prevention assistive device/personal item within reach;Fall Risk reviewed with patient/family;side rails raised x 2;family to remain at bedside   Positioning   Body Position supine   Head of Bed (HOB) Positioning HOB at 30-45 degrees   Pain/Comfort/Sleep   Pain Body Location head   Pain Rating (0-10): Rest 5   VN cued in to pt's room with permission. Admission questions completed with pt and daughter Bobbi at bedside and plan of care reviewed. Call bell w/in reach. Instructed to call for needs/assist oob.

## 2023-06-19 NOTE — H&P
[facility] Medicine H&P Note     Admitting Team: [facility] Hospitalist Team A  Attending Physician: Robby Castillo MD  Resident: Dr. Perez  Intern: Dr. Antony    Date of Admit: 6/18/2023    Chief Complaint     Fatigue, fever, shortness of breath x 1 day    Subjective:      History of Present Illness:  Carlin Madrigal is a 68 y.o. male with past medical history of HTN, HLD, DM2, hypothyroidism, recent SDH who presented on 6/18/2023 for fatigue, fever, and shortness of breath x 1 day.    The patient was in their usual state of health which includes independent of all ADLs until 6/13/23 when patient had 2 episodes of syncope while at work. Patient was diaphoretic and confused afterwards and presented to ED, where he was found to have small subdural bleed on CT. Patient had multiple CT heads, MRI brain during last admission and SDH was noted to be stable. Patient was discharged home 6/15/23 but per his daughter was still feeling weak, unsteady on his feet at time of discharge. The day prior to admission, patient's daughter noticed that the patient was complaining of being cold, seemed to be having trouble breathing, discomfort with laying flat. The day of admission patient's daughter checked his temp several times due to increased fatigue and stated he had tmax of 102F around lunch time, despite repeated doses of tylenol - which prompted her to bring the patient to ED.    Patient denies any headaches, neck pain, sick contacts, chest pain, shortness of breath, cough, sputum production, abdominal pain, nausea, vomiting, abdominal pain, dysuria, hematuria, urinary frequency or urgency, diarrhea, constipation.      Past Medical History:    HTN  HLD  DM2  Hypothyroidism  SDH (6/13/23)    Past Surgical History:    none    Allergies:    Review of patient's allergies indicates:  No Known Allergies    Home Medications:  Medications reconciled with patient.    Current Outpatient Medications   Medication Instructions     "butalbital-acetaminophen-caffeine -40 mg (FIORICET, ESGIC) -40 mg per tablet 1 tablet, Oral, Every 6 hours PRN    gabapentin (NEURONTIN) 300 mg, Oral, 3 times daily    levothyroxine (SYNTHROID) 50 mcg, Oral, Daily    lisinopriL (PRINIVIL,ZESTRIL) 40 mg, Oral, Daily    metFORMIN (GLUCOPHAGE) 1,000 mg, Oral, 2 times daily    NOVOLIN 70/30 U-100 INSULIN 100 unit/mL (70-30) injection 15 Units, Subcutaneous, 2 times daily    pravastatin (PRAVACHOL) 20 mg, Oral, Daily       Family History:    Non-contributory  No family history on file.    Social History:  Alcohol use: Denies  Tobacco use: Quit over 20 years ago, started "when I was young", "smoked a lot"  Recreational drug use: Denies  Occupation: Not currently working  Current living situation: Lives with wife, daughter, occasionally daughter's 4 children    Review of Systems:  A comprehensive review of systems was negative unless otherwise stated in the HPI.     Health Maintaince :   Primary Care Physician:  [facility]     Immunizations:   Currently on File:   There is no immunization history on file for this patient.  Influenza:   Not UTD  COVID-19:   x2, due for booster  Pneumonia:   Not UTD  TDap:    UTD per patient  Shingles:   Not UTD    Cancer Screening:  Colonoscopy:   Not UTD       Objective     ED Vitals: T 98.7, , /75, RR 16, O2 97% on RA, BMI 28.45    ED Intervention: 500cc bolus    Physical Examination:  Exam Vitals: T 99, HR 85, /82, RR 15, O2 97% on RA    BP (!) 175/79   Pulse 80   Temp 99 °F (37.2 °C) (Oral)   Resp 12   Ht 5' 11" (1.803 m)   Wt 92.5 kg (204 lb)   SpO2 97%   BMI 28.45 kg/m²    General: No acute distress, resting comfortably   HEENT: Atraumatic, normocephalic, moist mucous membranes  Cardiovascular: Regular rate and rhythm, normal S1 and S2, no extra heart sounds or murmurs appreciated   Chest wall: Non-tender to palpation, no gross deformities noted   Back: Non-tender to " palpation, no abnormal curvature noted, symmetric rise with respiration   Respiratory: Crackles in R lower lung base. Stable on room air, normal work of breathing, no conversational dyspnea, no accessory muscle use noted,   Abdominal: Non-distended, soft, normoactive bowel sounds, non-tender to palpation, no guarding  Extremities: Atraumatic, non-edematous, moving all four extremities  Pulses: 2+ and symmetric radial artery, posterior tibial artery, dorsalis pedis artery  Skin: Non-diaphoretic, warm, dry  Neurologic: No gross focal neurologic deficits noted      Laboratory:  Recent Labs   Lab 06/13/23  1129 06/14/23  0441 06/14/23  1709 06/15/23  0350 06/16/23  1301 06/18/23  1647   WBC 11.07  --  10.09  --  7.58 9.99   HGB 15.2  --  14.0  --  14.0 14.7     --  225  --  238 292   MCV 93  --  94  --  91 90    138  --  134* 136 131*   K 5.8* 4.9  --  4.8 4.3 4.3    108  --  102 105 95   CO2 17* 21*  --  24 24 26   BUN 35* 29*  --  19 21 23   CREATININE 2.3* 1.1  --  1.0 1.0 1.2   * 200*  --  183* 151* 171*   CALCIUM 10.2 8.8  --  8.8 9.6 9.4   PROT 8.1 7.1  --  7.2 7.6 7.9   ALBUMIN 4.1 3.5  --  3.3* 3.3* 3.4*   MG 1.7  --   --   --   --  1.6   AST 20 21  --  20 16 15   ALT 16 14  --  9* 10 11   ALKPHOS 82 70  --  64 69 69   TROPONINI 0.017  --   --   --   --  0.039*   BNP  --   --   --   --   --  92       All laboratory data reviewed    Microbiology Data: None    EKG Data: Reviewed  NSR with RBBB, left anterior fascicular block, previous EKG    Radiology Data: Reviewed    CXR portable     FINDINGS:  The lungs are mildly under expanded with crowding of the pulmonary vascularity.No consolidation.  No pleural effusion. No evident pneumothorax.     The cardiac silhouette is normal in size. The hilar and mediastinal contours are unremarkable.     Bones are intact.     Impression:     No acute abnormality.    CXR AP pending    Assessment/Plan     Carlin Madrigal is a 68 y.o. male with past medical  history of HTN, HLD, DM2, hypothyroidism, recent SDH who presented on 6/18/2023 for fatigue, fever, and shortness of breath x 1 day. Patient is admitted to [facility] for elevated troponin, suspected CAP.     Community Acquired Pneumonia  - Patient with fatigue, shortness of breath, reported fever to 102 at home, decreased appetite x1-2 days  - Crackles noted in R lower lung base  - AP CXR ordered  - WBC count WNL, afebrile in ED  - Ceftriaxone IV and azithromycin for CAP coverage, transition to PO at discharge, 5 day treatment course if afebrile >48 hrs    Elevated Troponin  - Patient with elevated troponin to 0.039 in ED, denies chest pain, palpitations, nausea/vomiting, abdominal pain  - EKG largely similar to previous, no significant ST abnormalities  - Patient with normal TTE 6/14/23  - Potentially elevated to due to hypovolemia, infection  - patient received 500cc IVF bolus x2  - Continue to trend troponin    Subdural Hematoma  - Patient with recent subdural hematoma following syncope, has undergone serial recent Head Cts, MRI brain, bleed is stable  - Followed by neurosurgery out-pt  - No acute issues at this time, chemical DVT prophylaxis held at this time    Hyponatremia  - 131 on admission, down from 136 on 6/16  - Possibly hypovolemic hyponatremia 2/2 to decreased PO intake since hospital discharge  - Asymptomatic at this time, continue to monitor    Type 2 DM  -A1c 7.8  - Home regimen metformin 100mg BID, novalin 70/30 15u BID  - detemir 10u nightly, accuchecks, SSI while admitted    Hypertension  -170s/80s upon admission  - Per daughter, BP consistently elevated at appts  - Continue lisinopril 40,  consider adding additional agents    Hyperlipidemia  - No acute issues at this time  - Continue pravastatin 20    Hypothyroidism  - Recent TSH WNL  - Continue synthroid 50mcg    Healthcare maintenance   -[facility] care provider is [facility]     Diet: Cardiac diet  VTE PPx: SCD  Code Status:  Full    Dispo: Pending troponin trend  Estimated LOS: 24 hours         Hemant Antony DO  [facility]  [facility] Hospitalist Medicine Team A      [facility] Hospitalist Pager numbers:   [facility] Hospitalist Medicine Team A (Haile/Jc): [phone]  [facility] Hospitalist Medicine Team B (Van/Jessy):  [phone]

## 2023-06-19 NOTE — PLAN OF CARE
Problem: Adult Inpatient Plan of Care  Goal: Optimal Comfort and Wellbeing  Outcome: Ongoing, Progressing     Problem: Diabetes Comorbidity  Goal: Blood Glucose Level Within Targeted Range  Outcome: Ongoing, Progressing     Problem: Electrolyte Imbalance  Goal: Electrolyte Balance  Outcome: Ongoing, Progressing     Problem: Fall Injury Risk  Goal: Absence of Fall and Fall-Related Injury  Outcome: Ongoing, Progressing

## 2023-06-19 NOTE — PLAN OF CARE
Patient seen for physical therapy evaluation on this date.  Patient is at independent level with all functional mobility and ADLs.  No further PT indicated.   No DME needs.  Full report to follow.  Discharge PT.    Problem: Physical Therapy  Goal: Physical Therapy Goal  Description:     Patient will increase functional independence with mobility by performin. Gait  x 200 feet with [place] using No Assistive Device:  GOAL MET    Outcome: Adequate for Care Transition

## 2023-06-19 NOTE — PLAN OF CARE
LUIS F met with pt and pt's wife Marleny at bedside to complete DCA and final d/c plan. Pt lives at home with his wife and his drt and her children. Pt will have his drt Bobbi [phone] help transport pt home at time of d.c. Pt does not use any HME at home and is fully independent in his ADL's. SW updated pt's AVS. White board updated with CM name and contact information.  Discharge brochure provided.  Pt encouraged to call with any questions or concerns.  Cm will continue to follow pt through transitions of care and assist with any discharge needs. Rounds completed on pt. All questions addressed. Bedside nurse to discuss d/c medications. Discussed importance to attend all f/u appts and take medications as prescribed. Verbalized understanding. Case Management to sign off.     ROXANA Solo  [phone]    Future Appointments   Date Time Provider Department Center   6/29/2023  9:40 AM [facility] CT1 LIMIT 400 LBS [facility] CT SCAN [facility] [place]   6/29/2023 10:40 AM Sarah Cisneros PA-C [facility] NEUSUR [facility] [place] [facility]        06/19/23 1045   Final Note   Assessment Type Final Discharge Note   Anticipated Discharge Disposition Home   What phone number can be called within the next 1-3 days to see how you are doing after discharge? [phone]   Hospital Resources/Appts/Education Provided Appointments scheduled by Navigator/Coordinator   Post-Acute Status   Discharge Delays None known at this time

## 2023-06-19 NOTE — DISCHARGE SUMMARY
hospitals Hospital Medicine Discharge Summary    Primary Team: hospitals Hospitalist Team A  Attending Physician: Natalia Greene MD  Resident: Dr. Perez  Intern: Dr. Antony    Date of Admit: 6/18/2023  Date of Discharge: 6/19/2023    Discharge to: Home/Self-Care  Condition: Stable    Discharge Diagnoses     Patient Active Problem List   Diagnosis    Hypertension    Diabetes mellitus    Hyperlipidemia    Hypothyroidism    ABDIRIZAK (acute kidney injury)    Recurrent syncope    Traumatic intracerebral hemorrhage    Elevated troponin    Shortness of breath    Hyponatremia    Type 2 diabetes mellitus with diabetic polyneuropathy, with long-term current use of insulin    Upper respiratory infection, acute       Consultants and Procedures     Consultants:  None    Procedures:   None    Brief History of Present Illness      Carlin Madrigal is a 68 y.o. male with past medical history of HTN, HLD, DM2, hypothyroidism, recent SDH who presented on 6/18/2023 for fatigue, fever, and shortness of breath x 1 day.     The patient was in their usual state of health which includes independent of all ADLs until 6/13/23 when patient had 2 episodes of syncope while at work. Patient was diaphoretic and confused afterwards and presented to ED, where he was found to have small subdural bleed on CT. Patient had multiple CT heads, MRI brain during last admission and SDH was noted to be stable. Patient was discharged home 6/15/23 but per his daughter was still feeling weak, unsteady on his feet at time of discharge. The day prior to admission, patient's daughter noticed that the patient was complaining of being cold, seemed to be having trouble breathing, discomfort with laying flat. The day of admission patient's daughter checked his temp several times due to increased fatigue and stated he had tmax of 102F around lunch time, despite repeated doses of tylenol - which prompted her to bring the patient to ED.     Patient denies any headaches, neck pain, sick  contacts, chest pain, shortness of breath, cough, sputum production, abdominal pain, nausea, vomiting, abdominal pain, dysuria, hematuria, urinary frequency or urgency, diarrhea, constipation.    In ED, patient had mild troponin elevation, however no symptoms and no dynamic EKG changes. Patient also noted to have crackles on physical exam but no effusion or consolidation noted on CXR. Patient was admitted to [facility] to trend troponin. Troponins peaked and declined overnight, patient was started on ceftriaxone and azithromycin for clinical diagnosis of CAP. Morning of discharge, patient was stable with no complaints, discharged on PO levaquin for CAP with 5 day treatment course total, close PCP follow up.    For the full HPI please refer to the History & Physical from this admission.    Hospital Course By Problem with Pertinent Findings     Community Acquired Pneumonia  - Patient with fatigue, shortness of breath, reported fever to 102 at home, decreased appetite x1-2 days  - Crackles noted in R lower lung base  - AP CXR ordered  - WBC count WNL, afebrile in ED  - Patient febrile to 100.9 overnight  - Ceftriaxone IV and azithromycin for CAP coverage, transitioned to PO Levaquin at discharge, 5 day treatment course if afebrile >48 hrs  - Tylenol PRN for fevers     Elevated Troponin (downtrending)  - Patient with elevated troponin to 0.039 in ED, denies chest pain, palpitations, nausea/vomiting, abdominal pain  - EKG largely similar to previous, no significant ST abnormalities  - Patient with normal TTE 6/14/23  - Potentially elevated to due to hypovolemia, infection  - patient received 500cc IVF bolus x2  Troponin 0.039 -> 0.052 -> 0.028 stop trending        Subdural Hematoma  - Patient with recent subdural hematoma following syncope, has undergone serial recent Head Cts, MRI brain, bleed is stable  - Followed by neurosurgery out-pt  - No acute issues at this time, chemical DVT prophylaxis held at this time    "  Hyponatremia (Improving)  - 131 on admission, down from 136 on 6/16  - Possibly hypovolemic hyponatremia 2/2 to decreased PO intake since hospital discharge  - Asymptomatic at this time, continue to monitor  - Follow up with PCP     Type 2 DM  -A1c 7.8  - Home regimen metformin 100mg BID, novalin 70/30 15u BID  - detemir 10u nightly, accuchecks, SSI while admitted  - Goal -180 while admitted  - Resume home regimen at discharge, follow up with PCP     Hypertension  -170s/80s upon admission  - Per daughter, BP consistently elevated at appts  - Continue lisinopril 40, Start amlodipine 5  - Follow up with PCP for further management     Hyperlipidemia  - No acute issues at this time  - Continue pravastatin 20     Hypothyroidism  - Recent TSH WNL  - Continue synthroid 50mcg      Discharge vital signs     /75 (BP Location: Right arm, Patient Position: Lying)   Pulse 70   Temp 99 °F (37.2 °C) (Oral)   Resp 18   Ht 5' 11" (1.803 m)   Wt 87.8 kg (193 lb 9 oz)   SpO2 95%   BMI 27.00 kg/m²      Discharge Medications        Medication List        START taking these medications      amLODIPine 5 MG tablet  Commonly known as: NORVASC  Take 1 tablet (5 mg total) by mouth once daily.  Start taking on: June 20, 2023     levoFLOXacin 750 MG tablet  Commonly known as: LEVAQUIN  Take 1 tablet (750 mg total) by mouth once daily. for 4 days            CONTINUE taking these medications      butalbital-acetaminophen-caffeine -40 mg -40 mg per tablet  Commonly known as: FIORICET, ESGIC  Take 1 tablet by mouth every 6 (six) hours as needed for Pain or Headaches.     gabapentin 300 MG capsule  Commonly known as: NEURONTIN     levothyroxine 50 MCG tablet  Commonly known as: SYNTHROID     lisinopriL 40 MG tablet  Commonly known as: PRINIVIL,ZESTRIL     metFORMIN 1000 MG tablet  Commonly known as: GLUCOPHAGE     NovoLIN 70/30 U-100 Insulin 100 unit/mL (70-30) injection  Generic drug: insulin NPH-insulin regular " (70/30)     pravastatin 20 MG tablet  Commonly known as: PRAVACHOL               Where to Get Your Medications        These medications were sent to [facility] [address], [place] [address]      Hours: Mon-Fri, 8a-5:30p Phone: [phone]   amLODIPine 5 MG tablet  levoFLOXacin 750 MG tablet          Discharge Information:   Diet:  Diabetic    Physical Activity:  As tolerated             Instructions:  1. Take all medications as prescribed  2. Keep all follow-up appointments  3. Return to the hospital or call your primary care physicians if any worsening symptoms such as fever, chest pain, shortness of breath, return of symptoms, or any other concerns.    Follow-Up Appointments:  PCP      Hemant Antony DO  [facility]  06/19/2023 10:47 AM

## 2023-06-19 NOTE — NURSING
Pt received from ED and assessment done. Pt in room air. Pt AAO x4. Pt denies pain or discomfort. No signs of distress. Family at bedside. Bed wheels locked, bed alarm on. Plan of care reviewed and pt verbalized understanding. Call light within reach. Instructed to call staffs for assistance.

## 2023-06-19 NOTE — PROGRESS NOTES
"[facility] Medicine Progress Note      Subjective/Interval History      Overnight, patient febrile to 100.9. This morning, patient's wife at bedside, patient eating breakfast sitting up in bed. Patient denies any subjective fevers or chills, headache, neck pain, shortness of breath, cough, sputum production, abdominal pain, n/v. No other complaints this morning.     Objective     Physical Examination:  BP (!) 161/77 (Patient Position: Lying)   Pulse 65   Temp 97.7 °F (36.5 °C) (Oral)   Resp 20   Ht 5' 11" (1.803 m)   Wt 87.8 kg (193 lb 9 oz)   SpO2 98%   BMI 27.00 kg/m²    General: No acute distress, resting comfortably   HEENT: Atraumatic, normocephalic, moist mucous membranes  Cardiovascular: Regular rate and rhythm, normal S1 and S2, no extra heart sounds or murmurs appreciated   Chest wall: Non-tender to palpation, no gross deformities noted   Back: Non-tender to palpation, no abnormal curvature noted, symmetric rise with respiration   Respiratory: Crackles in R lower lung base. Stable on room air, normal work of breathing, no conversational dyspnea, no accessory muscle use noted,   Abdominal: Non-distended, soft, normoactive bowel sounds, non-tender to palpation, no guarding  Extremities: Atraumatic, non-edematous, moving all four extremities  Pulses: 2+ and symmetric radial artery, posterior tibial artery, dorsalis pedis artery  Skin: Non-diaphoretic, warm, dry  Neurologic: No gross focal neurologic deficits noted      Intake/Output:    Intake/Output Summary (Last 24 hours) at 6/19/2023 0713  Last data filed at 6/19/2023 0640  Gross per 24 hour   Intake 0 ml   Output 200 ml   Net -200 ml     Net IO Since Admission: -200 mL [06/19/23 0713]    Microbiology Data: None     EKG Data: Reviewed  NSR with RBBB, left anterior fascicular block, previous EKG     Radiology Data: Reviewed     CXR portable      FINDINGS:  The lungs are mildly under expanded with crowding of the pulmonary vascularity.No " consolidation.  No pleural effusion. No evident pneumothorax.     The cardiac silhouette is normal in size. The hilar and mediastinal contours are unremarkable.     Bones are intact.     Impression:     No acute abnormality.     CXR AP     FINDINGS:  The lungs are well expanded and clear. No focal opacities are seen. The pleural spaces are clear.     The cardiac silhouette is unremarkable.  There are calcifications of the aortic arch     The visualized osseous structures demonstrate degenerative changes.     Impression:     No acute abnormality.     Current Medications:     Infusions:       Scheduled:   azithromycin  500 mg Oral Daily    cefTRIAXone (ROCEPHIN) IVPB  1 g Intravenous Q24H    gabapentin  300 mg Oral TID    insulin detemir U-100  10 Units Subcutaneous BID    levothyroxine  50 mcg Oral Before breakfast    lisinopriL  40 mg Oral Daily    pravastatin  20 mg Oral QHS        PRN:  acetaminophen, dextrose 10%, dextrose 10%, dextrose, dextrose, glucagon (human recombinant), insulin aspart U-100, melatonin, sodium chloride 0.9%      Assessment/Plan:     Carlin Madrigal is a 68 y.o. male with past medical history of HTN, HLD, DM2, hypothyroidism, recent SDH who presented on 6/18/2023 for fatigue, fever, and shortness of breath x 1 day. Patient is admitted to [facility] for elevated troponin, suspected CAP.     Community Acquired Pneumonia  - Patient with fatigue, shortness of breath, reported fever to 102 at home, decreased appetite x1-2 days  - Crackles noted in R lower lung base  - AP CXR ordered  - WBC count WNL, afebrile in ED  - Ceftriaxone IV and azithromycin for CAP coverage, transition to PO at discharge, 5 day treatment course if afebrile >48 hrs     Elevated Troponin (downtrending)  - Patient with elevated troponin to 0.039 in ED, denies chest pain, palpitations, nausea/vomiting, abdominal pain  - EKG largely similar to previous, no significant ST abnormalities  - Patient with normal TTE 6/14/23  -  Potentially elevated to due to hypovolemia, infection  - patient received 500cc IVF bolus x2  Troponin 0.039 -> 0.052 -> 0.028 stop trending       Subdural Hematoma  - Patient with recent subdural hematoma following syncope, has undergone serial recent Head Cts, MRI brain, bleed is stable  - Followed by neurosurgery out-pt  - No acute issues at this time, chemical DVT prophylaxis held at this time     Hyponatremia (Improving)  - 131 on admission, down from 136 on 6/16  - Possibly hypovolemic hyponatremia 2/2 to decreased PO intake since hospital discharge  - Asymptomatic at this time, continue to monitor     Type 2 DM  -A1c 7.8  - Home regimen metformin 100mg BID, novalin 70/30 15u BID  - detemir 10u nightly, accuchecks, SSI while admitted  - Goal -180 while admitted     Hypertension  -170s/80s upon admission  - Per daughter, BP consistently elevated at appts  - Continue lisinopril 40  - Start amlodipine 5     Hyperlipidemia  - No acute issues at this time  - Continue pravastatin 20     Hypothyroidism  - Recent TSH WNL  - Continue synthroid 50mcg     Healthcare maintenance   -primary care provider is [facility]      Diet: Cardiac diet  VTE PPx: SCD  Code Status: Full     Dispo: Pending troponin trend  Estimated LOS: 24 hours            Hemant Antony DO  [facility] HO-I  [facility]ist Medicine Team A        [facility] Medicine Hospitalist Pager numbers:   [facility]ist Medicine Team A (Haile/Jc):          [phone]  [facility]ist Medicine Team B (Van/Jessy):        [phone]

## 2023-06-19 NOTE — PROGRESS NOTES
Virtual Nurse:Discharge orders noted; additional clinical references attached.  and pharmacy tech notified.  Patient's discharge instruction packet given by bedside RN.    Cued into patient's room.  Permission received per patient & wife to turn camera to view patient.  Introduced as VN that will be instructing on discharge instructions.  Family member at bedside.  Educated patient on reason for admission; medications to hold, continue, and start, appointment to follow-up with doctor, and when to return to ED. Teach back method used. Verbalized understanding  Number given for 24/7 Nurse Line. Opportunity given for questions and questions answered.  Bedside nurse updated. Transport to [facility] requested.        06/19/23 1033   Admission   Communication Issues? None   Shift   Virtual Nurse - Patient Verbalized Approval Of Camera Use;VN Rounding   Type of Frequent Check   Type Patient Rounds

## 2023-06-19 NOTE — PT/OT/SLP EVAL
Physical Therapy Evaluation and Discharge Note    Patient Name:  Carlin Madrigal   MRN:  [id number]    Recommendations:     Discharge Recommendations: home  Discharge Equipment Recommendations: none   Barriers to discharge: None    Assessment:     Carlin Madrigal is a 68 y.o. male admitted with a medical diagnosis of Elevated troponin. .  At this time, patient is functioning at their prior level of function and does not require further acute PT services.     Recent Surgery: * No surgery found *      Plan:     During this hospitalization, patient does not require further acute PT services.  Please re-consult if situation changes.      Subjective     Chief Complaint: No complaints  Patient/Family Comments/goals: To return home   Pain/Comfort:  Pain Rating 1: 0/10  Pain Rating Post-Intervention 1: 0/10    Patients cultural, spiritual, Bahai conflicts given the current situation:      Living Environment:  Patient lives with spouse in a 1 SH, no LIZANDRO, WIS in bathroom.  Prior to admission, patients level of function was independent, has not worked for 2 weeks.  Recent history of 2 falls.  Independent with gait and ADLs.  Equipment used at home: none.  DME owned (not currently used): none.  Upon discharge, patient will have assistance from spouse.    Objective:     Communicated with nurse Wallace prior to session.  Patient found supine with peripheral IV, telemetry upon PT entry to room.    General Precautions: Standard, fall    Orthopedic Precautions:N/A   Braces: N/A  Respiratory Status: Room air    Exams:  Cognitive Exam:  Patient is oriented to Person, Place, Time, and Situation  Fine Motor Coordination:    -       Intact  Gross Motor Coordination:  WFL  Postural Exam:  Patient presented with the following abnormalities:    -       Rounded shoulders  -       Forward head  Sensation:    -       Intact  Skin Integrity/Edema:      -       Skin integrity: Visible skin intact  RLE ROM: WFL  RLE Strength: WFL  LLE ROM:  WFL  LLE Strength: WFL    Functional Mobility:  Bed Mobility:     Rolling Right: independence  Scooting: independence  Supine to Sit: independence  Sit to Supine: independence  Transfers:     Sit to Stand:  independence with no AD  Gait: with no AD x 200' at independent level  Balance: no LOB with sitting, standing, gait    AM-PAC 6 CLICK MOBILITY  Total Score:24       Treatment and Education:  Patient at [facility] [address].  Patient with no complaints of dizziness or off balance with standing or gait.  Patient with no PT needs.  Discharge PT    AM-PAC 6 CLICK MOBILITY  Total Score:24     Patient left supine with all lines intact, call button in reach, bed alarm on, and nurse notified.    GOALS:   Multidisciplinary Problems       Physical Therapy Goals          Problem: Physical Therapy    Goal Priority Disciplines Outcome Goal Variances Interventions   Physical Therapy Goal     PT, PT/OT Adequate for Care Transition     Description:     Patient will increase functional independence with mobility by performin. Gait  x 200 feet with [place] using No Assistive Device:  GOAL MET                         History:     Past Medical History:   Diagnosis Date    Diabetes mellitus     Hypertension        No past surgical history on file.    Time Tracking:     PT Received On: 23  PT Start Time: 954     PT Stop Time: 1008  PT Total Time (min): 14 min     Billable Minutes: Evaluation 6 and Gait Training 8      2023

## 2023-06-27 ENCOUNTER — NURSE TRIAGE (OUTPATIENT)
Dept: ADMINISTRATIVE | Facility: CLINIC | Age: 68
End: 2023-06-27

## 2023-06-27 NOTE — TELEPHONE ENCOUNTER
Pt's daughter calls on behalf of patient who is s/p intracranial hemorrhage on 6/13/23. Pt was sen in ED on 6/16/23 and then admitted on 6/18-6/19. His daughter calls today stating that pt is experiencing minimal energy, headaches, and low bloodpressures. His most recent BP was 110/53. Pt is currently in bed and does not want to get out of bed. Daughter notes that pt has been difficult to awaken and a bit confused today.    Care Advice recommends that  be called now. Daughter states that they live two blocks from the hospital and that she is going to drive pt there now. NT reiterates importance of care advice and advises her to call back if pt develops any new/worsening sxs, or if they have any additional questions or concerns. Pt's daughter verbalizes understanding.   Reason for Disposition   Difficult to awaken or acting confused (e.g., disoriented, slurred speech)    Additional Information   Negative: Systolic BP < 90 and feeling dizzy, lightheaded, or weak   Negative: Started suddenly after an allergic medicine, an allergic food, or bee sting   Negative: Shock suspected (e.g., cold/pale/clammy skin, too weak to stand, low BP, rapid pulse)    Protocols used: Blood Pressure - Low-A-OH

## 2023-07-10 ENCOUNTER — TELEPHONE (OUTPATIENT)
Dept: URGENT CARE | Facility: CLINIC | Age: 68
End: 2023-07-10
Payer: OTHER MISCELLANEOUS

## 2023-07-11 ENCOUNTER — OFFICE VISIT (OUTPATIENT)
Dept: URGENT CARE | Facility: CLINIC | Age: 68
End: 2023-07-11
Payer: OTHER MISCELLANEOUS

## 2023-07-11 ENCOUNTER — HOSPITAL ENCOUNTER (OUTPATIENT)
Dept: RADIOLOGY | Facility: HOSPITAL | Age: 68
Discharge: HOME | End: 2023-07-11
Attending: STUDENT IN AN ORGANIZED HEALTH CARE EDUCATION/TRAINING PROGRAM
Payer: OTHER MISCELLANEOUS

## 2023-07-11 VITALS
BODY MASS INDEX: 27.02 KG/M2 | TEMPERATURE: 98 F | SYSTOLIC BLOOD PRESSURE: 131 MMHG | WEIGHT: 193 LBS | DIASTOLIC BLOOD PRESSURE: 75 MMHG | HEART RATE: 75 BPM | OXYGEN SATURATION: 98 % | HEIGHT: 71 IN

## 2023-07-11 DIAGNOSIS — S06.0X9A: ICD-10-CM

## 2023-07-11 DIAGNOSIS — Z02.6 ENCOUNTER RELATED TO WORKER'S COMPENSATION CLAIM: ICD-10-CM

## 2023-07-11 DIAGNOSIS — G44.319 ACUTE POST-TRAUMATIC HEADACHE, NOT INTRACTABLE: ICD-10-CM

## 2023-07-11 DIAGNOSIS — R42 DIZZY: Primary | ICD-10-CM

## 2023-07-11 DIAGNOSIS — S29.9XXA RIB INJURY: ICD-10-CM

## 2023-07-11 DIAGNOSIS — S06.339D CONTUSION OF CEREBRUM WITH LOSS OF CONSCIOUSNESS, UNSPECIFIED LATERALITY, SUBSEQUENT ENCOUNTER: ICD-10-CM

## 2023-07-11 DIAGNOSIS — R42 DIZZY: ICD-10-CM

## 2023-07-11 DIAGNOSIS — S06.362D: ICD-10-CM

## 2023-07-11 DIAGNOSIS — S06.6X9D SUBARACHNOID HEMORRHAGE FOLLOWING INJURY, WITH LOSS OF CONSCIOUSNESS, SUBSEQUENT ENCOUNTER: Primary | ICD-10-CM

## 2023-07-11 PROCEDURE — 70553 MRI BRAIN STEM W/O & W/DYE: CPT | Mod: 26,,, | Performed by: RADIOLOGY

## 2023-07-11 PROCEDURE — 70553 MRI BRAIN W WO CONTRAST: ICD-10-PCS | Mod: 26,,, | Performed by: RADIOLOGY

## 2023-07-11 PROCEDURE — A9585 GADOBUTROL INJECTION: HCPCS | Performed by: STUDENT IN AN ORGANIZED HEALTH CARE EDUCATION/TRAINING PROGRAM

## 2023-07-11 PROCEDURE — 99215 PR OFFICE/OUTPT VISIT, EST, LEVL V, 40-54 MIN: ICD-10-PCS | Mod: S$GLB,,, | Performed by: STUDENT IN AN ORGANIZED HEALTH CARE EDUCATION/TRAINING PROGRAM

## 2023-07-11 PROCEDURE — 71101 XR RIBS MIN 3 VIEWS W/ PA CHEST RIGHT: ICD-10-PCS | Mod: RT,S$GLB,, | Performed by: RADIOLOGY

## 2023-07-11 PROCEDURE — 71101 X-RAY EXAM UNILAT RIBS/CHEST: CPT | Mod: RT,S$GLB,, | Performed by: RADIOLOGY

## 2023-07-11 PROCEDURE — 70553 MRI BRAIN STEM W/O & W/DYE: CPT | Mod: TC

## 2023-07-11 PROCEDURE — 25500020 PHARM REV CODE 255: Performed by: STUDENT IN AN ORGANIZED HEALTH CARE EDUCATION/TRAINING PROGRAM

## 2023-07-11 PROCEDURE — 99215 OFFICE O/P EST HI 40 MIN: CPT | Mod: S$GLB,,, | Performed by: STUDENT IN AN ORGANIZED HEALTH CARE EDUCATION/TRAINING PROGRAM

## 2023-07-11 RX ORDER — GADOBUTROL 604.72 MG/ML
9 INJECTION INTRAVENOUS
Status: COMPLETED | OUTPATIENT
Start: 2023-07-11 | End: 2023-07-11

## 2023-07-11 RX ADMIN — GADOBUTROL 9 ML: 604.72 INJECTION INTRAVENOUS at 03:07

## 2023-07-11 NOTE — PROGRESS NOTES
Subjective:      Patient ID: Carlin Madrigal is a 68 y.o. male.    Chief Complaint: Head Injury and Back Injury    Patient's place of employment - [employer]   Patient's job title -   Date of injury - 6*13/23  Body part injured including left or right - Head // Back  Injury Mechanism - Fall  What they were doing when they got hurt - He was standing by his car when he started to feel dizzy and passed out hitting his head and hurting his back. He states that he doesn't remember anything after that other than waking up in the hospital.  What they did immediately after - Was taken to the hospital  Pain scale right now - 5/10  EC    See MA note above. Begin MD note:  History per patient and his daughter, Bobbi Bragg    On 6/13, Mr. Madrigal states he woke up in his usual state of health and went to work. He had a sudden onset of dizziness and  passed out hitting his right side and head on the ground.  He denies chest pain, SOB, sweating, no nausea or vomiting. Patient was taken to the [facility] ED pm 6/13/23 and had a head CT on 6/14/23 which showed bleeding on his brain and the decision was made to keep him for observation. He was seen by a neurosurgeon during his observation and since the bleeding did not progress he was discharged on 6/15/23. He was advised to f/u with neurosurgery and was given an appointment for July 6. He went to the ED in [place] on 6/16/23 due to severe headaches and severely elevated BP. He was discharged home from the ED with no medication changes and advised to follow up with his neurosurgeon. A couple days later he began to run fever (101-102F) and appeared to be gasping for air and he was again taken to [place] ED on 6/18/23. He was found to have elevated troponin and his CXR showed some fluid on the lungs. He was diagnosed with pneumonia and was admitted for obs and was treated IV antibiotics. He also had a virtual cardiology consult due to the elevated troponin. He was discharged  the following day with a new BP med (amlodipine) to add to his home regimen, but his daughter stopped the medicine due to concern for low BP (109/52) for a few days. Once stopping, BP returned to 120s/70-80s.    The neurosurgeon did not take workman's comp so the July 6 appointment was cancelled.     The patient states that since his head injury, he is having intermittent headaches described as a dull ache and rated 6/10 when they come. No associated vision changes, nausea, vomiting, dizziness, or fatigue. The patient's daughter states he is very restless, has insomina and fatigue. She has been giving him melatonin to help him. She also says he has had increased irritability, anxiousness, and initial balance issues that have improved. The patient denies these symptoms. They agree that he has not experienced any confusion or memory loss. The right rib pain has been present since the fall and not previously evaluated. Hurts the most with movement or laying on the side. No other reported injuries, complaints, or concerns at this time.     Head Injury   Associated symptoms include blurred vision and headaches. Pertinent negatives include no numbness.     Constitution: Positive for activity change. Negative for generalized weakness.   Eyes:  Positive for blurred vision. Negative for double vision.   Musculoskeletal:  Positive for pain, abnormal ROM of joint, back pain, muscle cramps and muscle ache.   Skin:  Negative for erythema.   Neurological:  Positive for dizziness, passing out, headaches and loss of consciousness. Negative for light-headedness, numbness and tingling.   Psychiatric/Behavioral:  Positive for sleep disturbance.      Objective:     Physical Exam  Vitals and nursing note reviewed.   Constitutional:       General: He is not in acute distress.     Appearance: Normal appearance. He is not ill-appearing.   HENT:      Head: Normocephalic.   Eyes:      Conjunctiva/sclera: Conjunctivae normal.   Pulmonary:       Effort: No respiratory distress.   Chest:      Chest wall: Tenderness present.      Comments: Significant tenderness to palpation right distal ribs in axillary line/slightly anterior. No bruising, swelling, or redness. Pain reported with deep breathing or any movement of the torso.   Skin:     General: Skin is warm and dry.      Findings: No erythema.   Neurological:      General: No focal deficit present.      Mental Status: He is alert and oriented to person, place, and time.      GCS: GCS eye subscore is 4. GCS verbal subscore is 5. GCS motor subscore is 6.      Cranial Nerves: Cranial nerves 2-12 are intact. No cranial nerve deficit, dysarthria or facial asymmetry.      Sensory: Sensation is intact. No sensory deficit.      Motor: Motor function is intact. No weakness, tremor, seizure activity or pronator drift.      Coordination: Coordination is intact. Romberg sign negative. Coordination normal. Finger-Nose-Finger Test and Heel to Shin Test normal. Rapid alternating movements normal.      Gait: Gait is intact. Gait and tandem walk normal.      Deep Tendon Reflexes: Reflexes normal.   Psychiatric:         Attention and Perception: Attention and perception normal.         Mood and Affect: Mood normal.         Speech: Speech normal.         Behavior: Behavior normal.      Assessment:      1. Subarachnoid hemorrhage following injury, with loss of consciousness, subsequent encounter    2. Encounter related to worker's compensation claim    3. Contusion of cerebrum with loss of consciousness, unspecified laterality, subsequent encounter    4. Concussion with loss of consciousness from 31 to 59 minutes    5. Traumatic intracerebral hemorrhage with loss of consciousness of 31 minutes to 59 minutes, unspecified laterality, subsequent encounter    6. Rib injury    7. Acute post-traumatic headache, not intractable      Plan:     I discussed the diagnoses with the patient and his daughter and answered all questions about  symptoms and next steps. Mr. Madrigal was referred neurosurgery and a message was sent to the [facility] to expedite the referral. He will keep the appointment for his MRI today. He was advised not to resume work until neurosurgery evaluation due to brain hemorrhages. Disability paperwork completed today. Advised ED evaluation for any severe or worsening symptoms.     Radiologist's interpretation of rib series pending at the completion of the visit. The patient will be notified of any acute abnormalities noted in the report.         Patient Instructions: Referral to specialist to be scheduled, once authorized (Keep MRI appointment today.)   Restrictions: Disabled until next office visit  Follow up in about 1 week (around 7/18/2023) for GO TO EMERGENCY DEPARTMENT IF SYMPTOMS WORSEN.    I spent a total of 40 minutes on the day of the visit. This includes face to face time and non-face to face time preparing to see the patient (eg, review of tests, prior records/notes), obtaining and/or reviewing separately obtained history, documenting clinical information in the electronic or other health record, independently interpreting results and communicating results to the patient and family.

## 2023-07-11 NOTE — LETTER
[facility]  [address] [address]  Phone: [phone]  Fax: [phone]  [facility]: [id number]    Pt Name: Carlin Madrigal  Injury Date: 06/13/2023   Employee ID: [id number] Date of First Treatment: 07/11/2023   Company: [employer]       Appointment Time: 08:05 AM Arrived: 8:20am   Provider: Arti Roberts MD Time Out: 10:00am     Office Treatment:   1. Subarachnoid hemorrhage following injury, with loss of consciousness, subsequent encounter    2. Encounter related to worker's compensation claim    3. Anterograde amnesia    4. Contusion of cerebrum with loss of consciousness, unspecified laterality, subsequent encounter    5. Concussion with loss of consciousness, subsequent encounter    6. Traumatic intracerebral hemorrhage with loss of consciousness of 31 minutes to 59 minutes, unspecified laterality, subsequent encounter    7. Rib injury    8. Acute post-traumatic headache, not intractable          Patient Instructions: Referral to specialist to be scheduled, once authorized (Keep MRI appointment today.)      Restrictions: Disabled until next office visit     Return Appointment: 7/18/2023 at 9:15am.  EC

## 2023-07-17 ENCOUNTER — TELEPHONE (OUTPATIENT)
Dept: NEUROSURGERY | Facility: CLINIC | Age: 68
End: 2023-07-17
Payer: OTHER MISCELLANEOUS

## 2023-07-17 NOTE — TELEPHONE ENCOUNTER
----- Message from Braulio Hill MA sent at 7/12/2023  4:53 PM CDT -----  Regarding: FW: For Olga/ or other, Workman's Comp case, transfer from [facility], needs to know if [facility] can accept case  Contact: @[phone]    ----- Message -----  From: Lupe Zavala  Sent: 7/12/2023   2:25 PM CDT  To: [facility] Neurosurgery Clinical Support  Subject: For Olga/ or other, Workman's Comp case, tr#    For Olga/ or other, Workman's Comp case, transfer from [facility], needs to know if [facility] can accept case    I have an VARGAS (Nina) from the  neurosurgery clinic, they were referred a workman's comp case, but do not take workers comp cases- they are wondering if the case can be transferred to [facility]      MRN [id number] call back at @[phone]

## 2023-07-18 ENCOUNTER — OFFICE VISIT (OUTPATIENT)
Dept: URGENT CARE | Facility: CLINIC | Age: 68
End: 2023-07-18
Payer: OTHER MISCELLANEOUS

## 2023-07-18 VITALS
HEIGHT: 71 IN | DIASTOLIC BLOOD PRESSURE: 66 MMHG | HEART RATE: 76 BPM | WEIGHT: 199 LBS | OXYGEN SATURATION: 97 % | TEMPERATURE: 98 F | RESPIRATION RATE: 18 BRPM | BODY MASS INDEX: 27.86 KG/M2 | SYSTOLIC BLOOD PRESSURE: 107 MMHG

## 2023-07-18 DIAGNOSIS — S22.31XD CLOSED TRAUMATIC NONDISPLACED FRACTURE OF ONE RIB OF RIGHT SIDE WITH ROUTINE HEALING, SUBSEQUENT ENCOUNTER: ICD-10-CM

## 2023-07-18 DIAGNOSIS — S06.0X9A: Primary | ICD-10-CM

## 2023-07-18 DIAGNOSIS — S06.362D: ICD-10-CM

## 2023-07-18 DIAGNOSIS — G44.319 ACUTE POST-TRAUMATIC HEADACHE, NOT INTRACTABLE: ICD-10-CM

## 2023-07-18 DIAGNOSIS — S06.6X9D SUBARACHNOID HEMORRHAGE FOLLOWING INJURY, WITH LOSS OF CONSCIOUSNESS, SUBSEQUENT ENCOUNTER: ICD-10-CM

## 2023-07-18 DIAGNOSIS — S06.339D CONTUSION OF CEREBRUM WITH LOSS OF CONSCIOUSNESS, UNSPECIFIED LATERALITY, SUBSEQUENT ENCOUNTER: ICD-10-CM

## 2023-07-18 DIAGNOSIS — Z02.6 ENCOUNTER RELATED TO WORKER'S COMPENSATION CLAIM: ICD-10-CM

## 2023-07-18 PROCEDURE — 99215 PR OFFICE/OUTPT VISIT, EST, LEVL V, 40-54 MIN: ICD-10-PCS | Mod: S$GLB,,, | Performed by: STUDENT IN AN ORGANIZED HEALTH CARE EDUCATION/TRAINING PROGRAM

## 2023-07-18 PROCEDURE — 99215 OFFICE O/P EST HI 40 MIN: CPT | Mod: S$GLB,,, | Performed by: STUDENT IN AN ORGANIZED HEALTH CARE EDUCATION/TRAINING PROGRAM

## 2023-07-18 RX ORDER — LIDOCAINE 50 MG/G
1 PATCH TOPICAL DAILY PRN
Qty: 30 PATCH | Refills: 0 | Status: SHIPPED | OUTPATIENT
Start: 2023-07-18 | End: 2023-09-11

## 2023-07-18 NOTE — LETTER
[employer] [facility]  [address] [address]  Phone: [phone]  Fax: [phone]  [facility]: [id number]    Pt Name: Carlin Madrigal  Injury Date: 06/13/2023   Employee ID: [id number] Date of Treatment: 07/18/2023   Company: [employer]      Appointment Time: 09:15 AM Arrived: 9:05 AM    Provider: Arti Roberts MD Time Out:10:17 AM      Office Treatment:   1. Concussion with loss of consciousness from 31 to 59 minutes    2. Encounter related to worker's compensation claim    3. Subarachnoid hemorrhage following injury, with loss of consciousness, subsequent encounter    4. Contusion of cerebrum with loss of consciousness, unspecified laterality, subsequent encounter    5. Traumatic intracerebral hemorrhage with loss of consciousness of 31 minutes to 59 minutes, unspecified laterality, subsequent encounter    6. Closed traumatic nondisplaced fracture of one rib of right side with routine healing, subsequent encounter    7. Acute post-traumatic headache, not intractable      Medications Ordered This Encounter   Medications    LIDOcaine (LIDODERM) 5 %        Patient Instructions:  (Keep appointment with the neurosurgeon.)      Restrictions: Disabled until next office visit     Return Appointment: 8/4/2023 at 9:15 AM FARHEEN

## 2023-07-18 NOTE — PROGRESS NOTES
Subjective:      Patient ID: Carlin Madrigal is a 68 y.o. male.    Chief Complaint: Head Injury, Flank Pain (right), and Rib Injury (fx)    Patient's place of employment - [employer]  Patient's job title -   Date of Injury - 06/18/23  Body part injured - Head / right rib fx  Current work status per last visit - No activity  Improved, same, or worse - Same  Pain Scale right now (1-10) -  10 Ribs / Head -4    See MA note above. Begin MD note:  Since his last visit, Mr. Madrigal denies any new symptoms or worsening symptoms. Headaches are still intermittent and rated about 4/10. No dizziness, nausea, vomiting, vision changes, numbness, tingling, or weakness. He is still easily fatigued and has a low activity tolerance. His rib pain is severe with certain movements, mild with deep breathing. No SOB or increasing pain. His daughter states they have not heard anything regarding the neurosurgery referral that was placed at his last office visit.     Head Injury   Associated symptoms include headaches. Pertinent negatives include no blurred vision or numbness.   Flank Pain  Associated symptoms include headaches. Pertinent negatives include no numbness.     Constitution: Positive for activity change. Negative for generalized weakness.   Eyes:  Negative for double vision and blurred vision.   Genitourinary:  Positive for flank pain.   Musculoskeletal:  Positive for pain, abnormal ROM of joint, back pain, muscle cramps and muscle ache.   Skin:  Negative for erythema.   Neurological:  Positive for headaches. Negative for dizziness, light-headedness, passing out, loss of consciousness, numbness and tingling.   Psychiatric/Behavioral:  Positive for sleep disturbance.    Objective:     Physical Exam  Vitals and nursing note reviewed.   Constitutional:       General: He is not in acute distress.     Appearance: Normal appearance. He is not ill-appearing.   HENT:      Head: Normocephalic.   Eyes:      Conjunctiva/sclera:  Conjunctivae normal.   Pulmonary:      Effort: No respiratory distress.   Chest:      Chest wall: Tenderness present. No deformity, swelling or crepitus.      Comments: Right distal lateral rib tenderness with palpation. Pain with movement of torso. No erythema, warmth, swelling, or bruising  Skin:     General: Skin is warm and dry.      Findings: No erythema.   Neurological:      General: No focal deficit present.      Mental Status: He is alert and oriented to person, place, and time.      GCS: GCS eye subscore is 4. GCS verbal subscore is 5. GCS motor subscore is 6.      Cranial Nerves: No cranial nerve deficit.      Sensory: No sensory deficit.      Motor: No weakness.      Coordination: Coordination normal.      Gait: Gait normal.      Deep Tendon Reflexes: Reflexes normal.   Psychiatric:         Attention and Perception: Attention normal.         Mood and Affect: Mood normal.         Behavior: Behavior normal.      Assessment:      1. Concussion with loss of consciousness from 31 to 59 minutes    2. Encounter related to worker's compensation claim    3. Subarachnoid hemorrhage following injury, with loss of consciousness, subsequent encounter    4. Contusion of cerebrum with loss of consciousness, unspecified laterality, subsequent encounter    5. Traumatic intracerebral hemorrhage with loss of consciousness of 31 minutes to 59 minutes, unspecified laterality, subsequent encounter    6. Closed traumatic nondisplaced fracture of one rib of right side with routine healing, subsequent encounter    7. Acute post-traumatic headache, not intractable      EXAMINATION:  MRI BRAIN W WO CONTRAST     CLINICAL HISTORY:  Head trauma, GCS=15, loss of consciousness (LOC) (Ped 0-18y); Dizziness and giddiness     TECHNIQUE:  Multiplanar multisequence MR imaging of the brain was performed before and after the administration of 9 mL Gadavist intravenous contrast.     COMPARISON:  Multiple prior studies most recent is  6/16/23    Impression:     Continued temporal evolution/maturation of patient's known hemorrhagic contusions involving the inferior frontal lobes and left anterior temporal lobe.  No new intracranial hemorrhage, worsening mass effect or acute major vascular territory infarct.        Electronically signed by: Donny Alexandre MD  Date:                                            07/11/2023  Time:                                           16:04        EXAMINATION:  XR RIBS MIN 3 VIEWS W/ PA CHEST RIGHT     CLINICAL HISTORY:  Unspecified injury of thorax, initial encounter     TECHNIQUE:  PA of the chest and right ribs     COMPARISON:  06/18/2023     FINDINGS:  The heart and lungs are normal.  There is a little irregularity of the anterior tip of the right 9th rib that may represent an undisplaced fracture.  No bony destruction is noted degenerative changes seen in the thoracic spine.     Impression:     See above        Electronically signed by: Yemi Yañez MD  Date:                                            07/11/2023  Time:                                           10:19    Plan:     Reviewed recent MRI results (stable, resolving bleed) and rib xray noting 9th rib fracture. Informed them of neurosurgery referral approval and scheduled appointment on 8/3/23. They will keep this appointment and return here the following day for evaluation. Prescribed topical lidoderm patches for prn use for rib fracture. Will defer any narcotic meds due to brain injury. Ok to take OTC extra strength tylenol prn. Avoid NSAIDs. All questions and concerns regarding the treatment, plan of care, and follow up visits were discussed in detail with the patient and his daughter. They were in agreement with the plan of care and appeared to be happy to proceed as outlined.     Medications Ordered This Encounter   Medications    LIDOcaine (LIDODERM) 5 %     Sig: Place 1 patch onto the skin daily as needed. Remove & Discard patch within 12  hours or as directed by MD     Dispense:  30 patch     Refill:  0     Patient Instructions:  (Keep appointment with the neurosurgeon.)   Restrictions: Disabled until next office visit  Follow up in about 17 days (around 8/4/2023).      I spent a total of 40 minutes on the day of the visit. This includes face to face time and non-face to face time preparing to see the patient (eg, review of tests, prior records/notes), obtaining and/or reviewing separately obtained history, documenting clinical information in the electronic or other health record, independently interpreting results and communicating results to the patient and family.

## 2023-07-25 ENCOUNTER — TELEPHONE (OUTPATIENT)
Dept: NEUROSURGERY | Facility: CLINIC | Age: 68
End: 2023-07-25
Payer: OTHER MISCELLANEOUS

## 2023-08-03 ENCOUNTER — OFFICE VISIT (OUTPATIENT)
Dept: NEUROSURGERY | Facility: CLINIC | Age: 68
End: 2023-08-03
Payer: OTHER MISCELLANEOUS

## 2023-08-03 VITALS
WEIGHT: 190 LBS | HEART RATE: 99 BPM | HEIGHT: 71 IN | BODY MASS INDEX: 26.6 KG/M2 | TEMPERATURE: 98 F | SYSTOLIC BLOOD PRESSURE: 115 MMHG | DIASTOLIC BLOOD PRESSURE: 77 MMHG

## 2023-08-03 DIAGNOSIS — S06.362D: Primary | ICD-10-CM

## 2023-08-03 PROCEDURE — 99999 PR PBB SHADOW E&M-EST. PATIENT-LVL IV: CPT | Mod: PBBFAC,,, | Performed by: NEUROLOGICAL SURGERY

## 2023-08-03 PROCEDURE — 99999 PR PBB SHADOW E&M-EST. PATIENT-LVL IV: ICD-10-PCS | Mod: PBBFAC,,, | Performed by: NEUROLOGICAL SURGERY

## 2023-08-03 PROCEDURE — 99214 PR OFFICE/OUTPT VISIT, EST, LEVL IV, 30-39 MIN: ICD-10-PCS | Mod: S$GLB,,, | Performed by: NEUROLOGICAL SURGERY

## 2023-08-03 PROCEDURE — 99214 OFFICE O/P EST MOD 30 MIN: CPT | Mod: S$GLB,,, | Performed by: NEUROLOGICAL SURGERY

## 2023-08-03 NOTE — PROGRESS NOTES
Neurosurgery  Established Patient    SUBJECTIVE:     History of Present Illness:  Mr. Madrigal is a 68-year-old male who is seeing me today in follow-up.  He was admitted to [facility] in June 2023 after an episode of syncope during which she fell onto his back.  He was found to be hypoglycemic and admitted for observation.  However, he complained of headache and dizziness at that time.  A head CT done at that time showed left frontal contusions and subarachnoid hemorrhage.  Repeat CT scans were stable while he was admitted to the hospital.  He was ultimately discharged with outpatient follow-up.  He is here today to see me in follow-up.  Currently, he still complains of intermittent headaches.  He rates the headaches as 4/10 in intensity.  He denies dizziness, nausea, vomiting, or vision changes.  He does still complain of easy fatigability.  He denies any weakness or seizure-like activity.    Review of patient's allergies indicates:  No Known Allergies    Current Outpatient Medications   Medication Sig Dispense Refill    amLODIPine (NORVASC) 5 MG tablet Take 1 tablet (5 mg total) by mouth once daily. 30 tablet 1    gabapentin (NEURONTIN) 300 MG capsule Take 300 mg by mouth 3 (three) times daily.      levothyroxine (SYNTHROID) 50 MCG tablet Take 50 mcg by mouth Daily.      LIDOcaine (LIDODERM) 5 % Place 1 patch onto the skin daily as needed. Remove & Discard patch within 12 hours or as directed by MD 30 patch 0    lisinopriL (PRINIVIL,ZESTRIL) 40 MG tablet Take 40 mg by mouth Daily.      metFORMIN (GLUCOPHAGE) 1000 MG tablet Take 1,000 mg by mouth 2 (two) times a day.      NOVOLIN 70/30 U-100 INSULIN 100 unit/mL (70-30) injection Inject 15 Units into the skin 2 (two) times a day.      pravastatin (PRAVACHOL) 20 MG tablet Take 20 mg by mouth Daily.       No current facility-administered medications for this visit.       Past Medical History:   Diagnosis Date    Diabetes mellitus     Hypertension      History  "reviewed. No pertinent surgical history.  Family History    None       Social History     Socioeconomic History    Marital status:    Tobacco Use    Smoking status: Never     Passive exposure: Never    Smokeless tobacco: Never   Substance and Sexual Activity    Alcohol use: Yes     Comment: 06/13/23: occ    Drug use: Not Currently       Review of Systems    OBJECTIVE:     Vital Signs  Temp: 97.6 °F (36.4 °C)  Pulse: 99  BP: 115/77  Pain Score:   5  Height: 5' 11" (180.3 cm)  Weight: 86.2 kg (190 lb)  Body mass index is 26.5 kg/m².    Physical Exam:  Vitals reviewed.    Constitutional: He appears well-developed and well-nourished. No distress.     Eyes: Pupils are equal, round, and reactive to light. Conjunctivae and EOM are normal.     Cardiovascular: Normal rate, regular rhythm, normal pulses and no edema.     Abdominal: Soft. Bowel sounds are normal.     Skin: Skin displays no rash on trunk and no rash on extremities. Skin displays no lesions on trunk and no lesions on extremities.     Psych/Behavior: He is alert. He is oriented to person, place, and time. He has a normal mood and affect.     Musculoskeletal: Gait is normal.        Neck: Range of motion is full. There is no tenderness. Muscle strength is 5/5. Tone is normal.        Back: Range of motion is full. There is no tenderness. Muscle strength is 5/5. Tone is normal.        Right Upper Extremities: Range of motion is full. There is no tenderness. Muscle strength is 5/5. Tone is normal.        Left Upper Extremities: Range of motion is full. There is no tenderness. Muscle strength is 5/5. Tone is normal.       Right Lower Extremities: Range of motion is full. There is no tenderness. Muscle strength is 5/5. Tone is normal.        Left Lower Extremities: Range of motion is full. There is no tenderness. Muscle strength is 5/5. Tone is normal.     Neurological:        Coordination: He has a normal Romberg Test, normal finger to nose coordination and " normal tandem walking coordination.        Sensory: There is no sensory deficit in the trunk. There is no sensory deficit in the extremities.        DTRs: DTRs are DTRS NORMAL AND SYMMETRICnormal and symmetric. He displays no Babinski's sign on the right side. He displays no Babinski's sign on the left side.        Cranial nerves: Cranial nerve(s) II, III, IV, V, VI, VII, VIII, IX, X, XI and XII are intact.         Diagnostic Results:  He has an MRI with and without contrast of the brain available for review which I personally reviewed.  This shows continued temporal evolution/maturation of patient's known hemorrhagic contusions involving the inferior frontal lobes and left anterior temporal lobe.    ASSESSMENT/PLAN:     Mr. Madrigal is a 68-year-old male status post a syncopal episode in June 2023 with head trauma at that time.  Imaging studies at that time showed left frontal contusions as well as scattered subarachnoid hemorrhage.  His most recent MRI with and without contrast of the brain show evolution of these contusions without any evidence of new hemorrhage.  The patient was still symptomatic with headaches.  This is likely postconcussive in nature.  Given the fact that there are no new hemorrhages or new intracranial findings, I do not believe that there is any role for neurosurgical intervention at this time.  I have recommended a referral to 1 of our concussion specialist for further management of his persistent symptoms and headaches.  I will see him on an as-needed basis.  He knows he can call with any further questions or concerns in the meantime.        Note dictated with voice recognition software, please excuse any grammatical errors.

## 2023-08-04 ENCOUNTER — OFFICE VISIT (OUTPATIENT)
Dept: URGENT CARE | Facility: CLINIC | Age: 68
End: 2023-08-04
Payer: OTHER MISCELLANEOUS

## 2023-08-04 VITALS
WEIGHT: 190 LBS | SYSTOLIC BLOOD PRESSURE: 132 MMHG | OXYGEN SATURATION: 96 % | HEART RATE: 77 BPM | DIASTOLIC BLOOD PRESSURE: 74 MMHG | HEIGHT: 71 IN | BODY MASS INDEX: 26.6 KG/M2

## 2023-08-04 DIAGNOSIS — G44.319 ACUTE POST-TRAUMATIC HEADACHE, NOT INTRACTABLE: ICD-10-CM

## 2023-08-04 DIAGNOSIS — S06.0X9A: Primary | ICD-10-CM

## 2023-08-04 DIAGNOSIS — S22.31XD CLOSED TRAUMATIC NONDISPLACED FRACTURE OF ONE RIB OF RIGHT SIDE WITH ROUTINE HEALING, SUBSEQUENT ENCOUNTER: ICD-10-CM

## 2023-08-04 DIAGNOSIS — Z02.6 ENCOUNTER RELATED TO WORKER'S COMPENSATION CLAIM: ICD-10-CM

## 2023-08-04 DIAGNOSIS — S06.362D: ICD-10-CM

## 2023-08-04 DIAGNOSIS — S06.339D CONTUSION OF CEREBRUM WITH LOSS OF CONSCIOUSNESS, UNSPECIFIED LATERALITY, SUBSEQUENT ENCOUNTER: ICD-10-CM

## 2023-08-04 PROCEDURE — 99214 PR OFFICE/OUTPT VISIT, EST, LEVL IV, 30-39 MIN: ICD-10-PCS | Mod: S$GLB,,, | Performed by: STUDENT IN AN ORGANIZED HEALTH CARE EDUCATION/TRAINING PROGRAM

## 2023-08-04 PROCEDURE — 99214 OFFICE O/P EST MOD 30 MIN: CPT | Mod: S$GLB,,, | Performed by: STUDENT IN AN ORGANIZED HEALTH CARE EDUCATION/TRAINING PROGRAM

## 2023-08-04 NOTE — LETTER
[facility]  [address] [address]  Phone: [phone]  Fax: [phone]  [facility]: [id number]    Pt Name: Carlin Madrigal  Injury Date: 06/13/2023   Employee ID: [id number] Date of  Treatment: 08/04/2023   Company: [employer]      Appointment Time: 09:15 AM Arrived: 9:14 AM   Provider: Arti Roberts MD Time Out:10:15 AM      Office Treatment:   1. Concussion with loss of consciousness from 31 to 59 minutes    2. Encounter related to worker's compensation claim    3. Closed traumatic nondisplaced fracture of one rib of right side with routine healing, subsequent encounter    4. Acute post-traumatic headache, not intractable    5. Traumatic intracerebral hemorrhage with loss of consciousness of 31 minutes to 59 minutes, unspecified laterality, subsequent encounter    6. Contusion of cerebrum with loss of consciousness, unspecified laterality, subsequent encounter          Patient Instructions: Referral to specialist to be scheduled, once authorized      Restrictions: Disabled until next office visit     Return Appointment: 9/1/2023 at 8:45 AM [facility]

## 2023-08-04 NOTE — PROGRESS NOTES
Subjective:      Patient ID: Carlin Madrigal is a 68 y.o. male.    Chief Complaint: Head Injury and Rib Injury (Right)    Patient's place of employment - [employer]  Patient's job title -   Date of Injury - 6/18/23  Body part injured - Head / Right Rib fx  Current work status per last visit - No activity  Improved, same, or worse - Same  Pain Scale right now (1-10) -  6/10 Ribs / Head 9-10/10 (with movement)    See MA note above. Begin MD note:  Mr. Madrigal is accompanied by his wife for this visit.  He says that he saw the neurosurgeon yesterday and says he was told that his brain bleed is improving in size. He says he was referred to neurology and did not require any intervention or further follow up with the neurosurgeon. Since his last visit here in [facility] he is still having intermittent posterior headaches. He was dizzy approx 3-4 days ago when getting out of bed. This resolved and no occurrences since then. He still fatigues easily and has decreased activity tolerance. No vision changes,insomnia, weakness, numbness, or tingling .  His rib pain from the fracture is still present but improving. He is still taking tylenol for headaches or rib pain prn. The insurance company denied the lidocaine patches that were prescribed at the last visit.         Constitution: Positive for activity change and generalized weakness.   Neck: Positive for neck pain and neck stiffness.   Musculoskeletal:  Positive for pain, abnormal ROM of joint and back pain. Negative for pain with walking, muscle cramps and muscle ache.   Skin:  Negative for erythema.   Neurological:  Positive for dizziness and headaches. Negative for light-headedness, passing out, numbness and tingling.   Psychiatric/Behavioral:  Negative for sleep disturbance.      Objective:     Physical Exam  Vitals and nursing note reviewed.   Constitutional:       General: He is not in acute distress.     Appearance: Normal appearance. He is not ill-appearing.    HENT:      Head: Normocephalic.   Eyes:      Conjunctiva/sclera: Conjunctivae normal.   Pulmonary:      Effort: No respiratory distress.   Chest:      Comments: TTP distal anterior right ribs. No bruising or swelling.   Musculoskeletal:      Comments: Pain right rib area with rotation and bending to the right.    Skin:     Findings: No erythema.   Neurological:      General: No focal deficit present.      Mental Status: He is alert and oriented to person, place, and time.      GCS: GCS eye subscore is 4. GCS verbal subscore is 5. GCS motor subscore is 6.      Cranial Nerves: Cranial nerves 2-12 are intact.      Sensory: Sensation is intact.      Motor: Motor function is intact. No weakness or pronator drift.      Coordination: Coordination is intact.      Gait: Gait is intact.   Psychiatric:         Attention and Perception: Attention normal.         Mood and Affect: Mood normal.         Behavior: Behavior normal.        Assessment:      1. Concussion with loss of consciousness from 31 to 59 minutes    2. Encounter related to worker's compensation claim    3. Closed traumatic nondisplaced fracture of one rib of right side with routine healing, subsequent encounter    4. Acute post-traumatic headache, not intractable    5. Traumatic intracerebral hemorrhage with loss of consciousness of 31 minutes to 59 minutes, unspecified laterality, subsequent encounter    6. Contusion of cerebrum with loss of consciousness, unspecified laterality, subsequent encounter      Plan:     The specialist note has not been completed from his office visit yesterday so the plan as discussed is unable to be verified on chart review at this time.  However the referral to the neurologist was placed and currently in pending status.  He was encouraged to schedule this appointment once authorized. His concussion and head injury symptoms are stable and improving and the patient seems to be encouraged by this.  No acute findings on exam  today.    The Lidoderm patches that were prescribed at the last visit were denied by his insurance company.  I am still recommended that he avoids NSAIDs due to recent intracranial hemorrhage.  It is okay to continue Tylenol as needed and also okay to purchase the lidocaine patches OTC if needed.        Patient will follow up in 4 weeks for continued monitoring of rib fracture healing, concussion recovery and to discuss neurology visit.  Okay to return to clinic sooner if needed.  ED precautions were given.       Patient Instructions: Referral to specialist to be scheduled, once authorized   Restrictions: Disabled until next office visit  Follow up in about 4 weeks (around 9/1/2023).    I spent a total of 30 minutes on the day of the visit. This includes face to face time and non-face to face time preparing to see the patient (eg, review of tests, prior records/notes), obtaining and/or reviewing separately obtained history, documenting clinical information in the electronic or other health record, independently interpreting results and communicating results to the patient and family.

## 2023-08-11 ENCOUNTER — PATIENT MESSAGE (OUTPATIENT)
Dept: NEUROSURGERY | Facility: CLINIC | Age: 68
End: 2023-08-11
Payer: OTHER MISCELLANEOUS

## 2023-09-01 ENCOUNTER — OFFICE VISIT (OUTPATIENT)
Dept: URGENT CARE | Facility: CLINIC | Age: 68
End: 2023-09-01
Payer: OTHER MISCELLANEOUS

## 2023-09-01 VITALS
DIASTOLIC BLOOD PRESSURE: 72 MMHG | BODY MASS INDEX: 27.58 KG/M2 | HEART RATE: 76 BPM | OXYGEN SATURATION: 97 % | HEIGHT: 71 IN | SYSTOLIC BLOOD PRESSURE: 113 MMHG | WEIGHT: 197 LBS | TEMPERATURE: 98 F | RESPIRATION RATE: 18 BRPM

## 2023-09-01 DIAGNOSIS — G44.319 ACUTE POST-TRAUMATIC HEADACHE, NOT INTRACTABLE: ICD-10-CM

## 2023-09-01 DIAGNOSIS — S06.0X9A: Primary | ICD-10-CM

## 2023-09-01 DIAGNOSIS — S06.6X9D SUBARACHNOID HEMORRHAGE FOLLOWING INJURY, WITH LOSS OF CONSCIOUSNESS, SUBSEQUENT ENCOUNTER: ICD-10-CM

## 2023-09-01 DIAGNOSIS — S06.362D: ICD-10-CM

## 2023-09-01 DIAGNOSIS — Z02.6 ENCOUNTER RELATED TO WORKER'S COMPENSATION CLAIM: ICD-10-CM

## 2023-09-01 DIAGNOSIS — S06.339D CONTUSION OF CEREBRUM WITH LOSS OF CONSCIOUSNESS, UNSPECIFIED LATERALITY, SUBSEQUENT ENCOUNTER: ICD-10-CM

## 2023-09-01 DIAGNOSIS — F07.81 POST CONCUSSION SYNDROME: ICD-10-CM

## 2023-09-01 DIAGNOSIS — S22.31XD CLOSED TRAUMATIC NONDISPLACED FRACTURE OF ONE RIB OF RIGHT SIDE WITH ROUTINE HEALING, SUBSEQUENT ENCOUNTER: ICD-10-CM

## 2023-09-01 PROCEDURE — 71100 XR RIBS 2 VIEW RIGHT: ICD-10-PCS | Mod: RT,S$GLB,, | Performed by: RADIOLOGY

## 2023-09-01 PROCEDURE — 99215 PR OFFICE/OUTPT VISIT, EST, LEVL V, 40-54 MIN: ICD-10-PCS | Mod: S$GLB,,, | Performed by: STUDENT IN AN ORGANIZED HEALTH CARE EDUCATION/TRAINING PROGRAM

## 2023-09-01 PROCEDURE — 71100 X-RAY EXAM RIBS UNI 2 VIEWS: CPT | Mod: RT,S$GLB,, | Performed by: RADIOLOGY

## 2023-09-01 PROCEDURE — 99215 OFFICE O/P EST HI 40 MIN: CPT | Mod: S$GLB,,, | Performed by: STUDENT IN AN ORGANIZED HEALTH CARE EDUCATION/TRAINING PROGRAM

## 2023-09-01 RX ORDER — DICLOFENAC SODIUM 10 MG/G
2 GEL TOPICAL 3 TIMES DAILY
Qty: 100 G | Refills: 0 | Status: SHIPPED | OUTPATIENT
Start: 2023-09-01

## 2023-09-01 NOTE — LETTER
[facility]  [address] [address]  Phone: [phone]  Fax: [phone]  [facility]: [id number]    Pt Name: Carlin Madrigal  Injury Date: 06/13/2023   Employee ID: [id number] Date of Treatment: 09/01/2023   Company: [employer]      Appointment Time: 08:45 AM Arrived: 8:53 AM   Provider: Arti Roberts MD Time Out:9:56 AM      Office Treatment:   1. Concussion with loss of consciousness from 31 to 59 minutes    2. Encounter related to worker's compensation claim    3. Closed traumatic nondisplaced fracture of one rib of right side with routine healing, subsequent encounter    4. Acute post-traumatic headache, not intractable    5. Traumatic intracerebral hemorrhage with loss of consciousness of 31 minutes to 59 minutes, unspecified laterality, subsequent encounter    6. Contusion of cerebrum with loss of consciousness, unspecified laterality, subsequent encounter    7. Subarachnoid hemorrhage following injury, with loss of consciousness, subsequent encounter    8. Post concussion syndrome      Medications Ordered This Encounter   Medications    diclofenac sodium (VOLTAREN) 1 % Gel        Patient Instructions: Referral to specialist to be scheduled, once authorized, PT to be scheduled once authorized      Restrictions: Disabled until next office visit     Return Appointment: 9/22/2023 at 8:45 AM [facility]

## 2023-09-01 NOTE — PROGRESS NOTES
Subjective:      Patient ID: Carlin Madrigal is a 68 y.o. male.    Chief Complaint: Rib Injury and Head Injury    Patient's place of employment - [employer]  Patient's job title -   Date of Injury - 6/18/23  Body part injured - Head / Right Rib fx  Current work status per last visit - No activity (disabled)   Improved, same, or worse - Same  Pain Scale right now (1-10) -  10/10 headaches constantly; 0/10 ribs, only bothers him when he gets up from lying down.       See MA note above. Begin MD note:  Since his last office visit, he reports experiencing increased headaches. Last week he had a headache x 2 days constantly. Took Tylenol extra strength which helped. His daughter (Bobbi Bragg) is present with him and states he sleeps a lot. Daughter notes increased irritability which correlates with the headaches. The patient says when he sleeps it's the only time he is pain free. He also reports mild dizziness that seems to be mostly positional. No vision changes, numbness, tingling, no extremity weakness. He has not yet heard anything regarding his neuro referral.       Constitution: Positive for activity change and generalized weakness.   Neck: Positive for neck pain and neck stiffness.   Musculoskeletal:  Positive for pain, abnormal ROM of joint and back pain. Negative for pain with walking, muscle cramps and muscle ache.   Skin:  Negative for erythema.   Neurological:  Positive for dizziness and headaches. Negative for light-headedness, passing out, numbness and tingling.   Psychiatric/Behavioral:  Negative for sleep disturbance.      Objective:     Physical Exam  Vitals and nursing note reviewed.   Constitutional:       General: He is not in acute distress.     Appearance: Normal appearance. He is not ill-appearing.   HENT:      Head: Normocephalic.   Eyes:      Conjunctiva/sclera: Conjunctivae normal.   Pulmonary:      Effort: No respiratory distress.   Chest:      Chest wall: Tenderness present. No mass,  deformity, swelling, crepitus or edema.          Comments: TTP distal anterolateral chest wall.   Skin:     General: Skin is warm and dry.      Findings: No erythema.   Neurological:      General: No focal deficit present.      Mental Status: He is alert and oriented to person, place, and time.      GCS: GCS eye subscore is 4. GCS verbal subscore is 5. GCS motor subscore is 6.      Sensory: No sensory deficit.      Motor: No weakness.      Coordination: Coordination normal.      Gait: Gait normal.   Psychiatric:         Attention and Perception: Attention normal.         Mood and Affect: Mood normal.         Behavior: Behavior normal.         Thought Content: Thought content normal.         Judgment: Judgment normal.        Assessment:      1. Concussion with loss of consciousness from 31 to 59 minutes    2. Encounter related to worker's compensation claim    3. Closed traumatic nondisplaced fracture of one rib of right side with routine healing, subsequent encounter    4. Acute post-traumatic headache, not intractable    5. Traumatic intracerebral hemorrhage with loss of consciousness of 31 minutes to 59 minutes, unspecified laterality, subsequent encounter    6. Contusion of cerebrum with loss of consciousness, unspecified laterality, subsequent encounter    7. Subarachnoid hemorrhage following injury, with loss of consciousness, subsequent encounter    8. Post concussion syndrome      EXAMINATION:  XR RIBS 2 VIEW RIGHT     CLINICAL HISTORY:  Fracture of one rib, right side, subsequent encounter for fracture with routine healing     TECHNIQUE:  Two views of the right ribs were performed.     COMPARISON:  07/11/2023     FINDINGS:  The patient may have healing or healed fractures involving the anterior aspect of the 9th and 10th ribs on the right side.  No acute fracture or dislocation.  No bone destruction identified     Impression:     See above        Electronically signed by: Hemanth Short MD  Date:                                             09/01/2023  Time:                                           11:40    Plan:     New imaging due to reported increased rib pain. Xrays without any displaplaced fractures and his fractures appear to be healing well. Mr. Madrigal will use the voltaren gel prn for his rib pain and continue tylenol prn.     [place] epic and [place] [facility] [place]. In an effort to aid in his recovery and due to increasing headaches, I have referred him to neuro PT. Plan to transfer all post concussion syndrome care to neurologist upon establishing care. F/u in 2 weeks for rib fracture monitoring and to check status of his referral.     Medications Ordered This Encounter   Medications    diclofenac sodium (VOLTAREN) 1 % Gel     Sig: Apply 2 g topically 3 (three) times daily.     Dispense:  100 g     Refill:  0     Patient Instructions: Referral to specialist to be scheduled, once authorized, PT to be scheduled once authorized   Restrictions: Disabled until next office visit  Follow up in about 3 weeks (around 9/22/2023).    I spent a total of 40 minutes on the day of the visit. This includes face to face time and non-face to face time preparing to see the patient (eg, review of tests, prior records/notes), obtaining and/or reviewing separately obtained history, documenting clinical information in the electronic or other health record, independently interpreting results and communicating results to the patient and family.

## 2023-09-05 ENCOUNTER — TELEPHONE (OUTPATIENT)
Dept: NEUROLOGY | Facility: CLINIC | Age: 68
End: 2023-09-05
Payer: OTHER MISCELLANEOUS

## 2023-09-05 NOTE — TELEPHONE ENCOUNTER
Pt's daughter was returning my call about schedule an appt. Pt is scheduled for September 11 at [place].

## 2023-09-05 NOTE — TELEPHONE ENCOUNTER
Called Pt's daughter to schedule a concussion appt. I was unable to speak with her but left a vm.

## 2023-09-11 ENCOUNTER — OFFICE VISIT (OUTPATIENT)
Dept: NEUROLOGY | Facility: CLINIC | Age: 68
End: 2023-09-11
Payer: OTHER MISCELLANEOUS

## 2023-09-11 VITALS
HEART RATE: 99 BPM | BODY MASS INDEX: 27.98 KG/M2 | DIASTOLIC BLOOD PRESSURE: 79 MMHG | SYSTOLIC BLOOD PRESSURE: 125 MMHG | WEIGHT: 200.63 LBS

## 2023-09-11 DIAGNOSIS — S13.4XXA WHIPLASH INJURY TO NECK, INITIAL ENCOUNTER: ICD-10-CM

## 2023-09-11 DIAGNOSIS — S06.0X9S CONCUSSION WITH LOSS OF CONSCIOUSNESS, SEQUELA: Primary | ICD-10-CM

## 2023-09-11 DIAGNOSIS — G47.9 FATIGUE DUE TO SLEEP PATTERN DISTURBANCE: ICD-10-CM

## 2023-09-11 DIAGNOSIS — S06.6X9S SUBARACHNOID HEMORRHAGE FOLLOWING INJURY, WITH LOSS OF CONSCIOUSNESS, SEQUELA: ICD-10-CM

## 2023-09-11 DIAGNOSIS — M54.2 CERVICALGIA OF OCCIPITO-ATLANTO-AXIAL REGION: ICD-10-CM

## 2023-09-11 DIAGNOSIS — R53.83 FATIGUE DUE TO SLEEP PATTERN DISTURBANCE: ICD-10-CM

## 2023-09-11 DIAGNOSIS — R26.89 IMBALANCE: ICD-10-CM

## 2023-09-11 DIAGNOSIS — M54.81 OCCIPITAL NEURITIS: ICD-10-CM

## 2023-09-11 DIAGNOSIS — H55.81 SACCADIC EYE MOVEMENT DEFICIENCY: ICD-10-CM

## 2023-09-11 DIAGNOSIS — G44.86 CERVICOGENIC HEADACHE: ICD-10-CM

## 2023-09-11 PROBLEM — S06.0X9A CONCUSSION WITH LOSS OF CONSCIOUSNESS: Status: ACTIVE | Noted: 2023-09-11

## 2023-09-11 PROBLEM — S06.6X9A SUBARACHNOID HEMORRHAGE FOLLOWING INJURY, WITH LOSS OF CONSCIOUSNESS: Status: ACTIVE | Noted: 2023-09-11

## 2023-09-11 PROCEDURE — 99205 OFFICE O/P NEW HI 60 MIN: CPT | Mod: S$GLB,,, | Performed by: PSYCHIATRY & NEUROLOGY

## 2023-09-11 PROCEDURE — 99205 PR OFFICE/OUTPT VISIT, NEW, LEVL V, 60-74 MIN: ICD-10-PCS | Mod: S$GLB,,, | Performed by: PSYCHIATRY & NEUROLOGY

## 2023-09-11 PROCEDURE — 99999 PR PBB SHADOW E&M-EST. PATIENT-LVL III: CPT | Mod: PBBFAC,,, | Performed by: PSYCHIATRY & NEUROLOGY

## 2023-09-11 PROCEDURE — 99999 PR PBB SHADOW E&M-EST. PATIENT-LVL III: ICD-10-PCS | Mod: PBBFAC,,, | Performed by: PSYCHIATRY & NEUROLOGY

## 2023-09-11 RX ORDER — METHYLPREDNISOLONE 4 MG/1
TABLET ORAL
Qty: 1 EACH | Refills: 0 | Status: SHIPPED | OUTPATIENT
Start: 2023-09-11 | End: 2023-09-27

## 2023-09-11 NOTE — PATIENT INSTRUCTIONS
Medrol dose pack prescribed. Take as instructed on package insert.  Monitor sugar while on Medrol and if goes greater than 200, stop it and please contact me  The patient was instructed to ice the occipital region for no more than 20 minutes at least once a day but may repeat this as many times as they would like.   Discussed ergonomic accommodations for occipital neuritis/neuralgia. Mainly perform all work at eye level to minimize continued neck flexion which will aggravate the nerve.  Patient was encouraged to do daily light cardio exercise but instructed to limit physical activities to walking, walking in water up to the waist only or riding a stationary bike, recumbent preferred. No weight lifting in upper body, no neck massage, no acupuncture of neck, and no dry needling of upper neck. No neck PT unless otherwise stated. No chiropractor work on neck. Lower body strengthening with resistant bands, leg machines, and strapping weights to legs okay. No core body workouts. No running or use of cardio equipment other than stationary bike. No swimming or body surfing. No amusement park rides. No lifting more than 5-10 lbs and bend at the knees, not the waist.  Discussed care plan in detail for post traumatic occipital neuritis including a trial of oral medications followed by series of trigger point steroid injections with occipital nerve blocks. To be referred for consultation for occipital nerve release procedure if initially clinically responsive to injections but always with a return of symptoms.  Referral for vestibular PT for balance and eye movements  Do not start neck PT at this time  You do not feel your feet well due to neuropathy. Please wear footwear with good tread at all times. This recommendation includes wearing socks with tread on them. Make sure all of your walkways, hallways, and julio are well lit at all times day and night. Use night lights to light up commonly used pathways in your home,  meaning from the bedroom to the bathroom or kitchen. Make sure all of your walkways, hallways, and julio are clear of obstacles at all times. Obstacles include decorations, rugs/mats, pet beds, shoes, and clothes. Make sure you have good hand holds to grab onto as you walk around your home.  It is with a high degree of medical certainty that this patient's current signs and symptoms were caused or exacerbated by the work related injury mentioned in the note above  The patient can do light duty desk work only with the above restrictions at this time  Any delay in treatment that I am recommending for the patient's work related injury as the result of things like IMEs, FCEs, and denials in the care plan may delay the patient's recovery. Delays in recovery may cause or further worsen any underlying permanent injury that was caused by the result of the patient's injury. Delays in recovery may also cause the development of new medical conditions that will then need to be treated. The development of some forms of permanent injuries may result in nerve injuries that are refractory to initial treatment, which could result in further medical expenses as more expensive methods of treatment or prolonged treatment may be needed to treat the underlying injury. In my collective clinical experience, my care plan as documented from the initial note leads to significant recovery from injuries similar to the patient's in the majority of my other patients. Delays in recovery will prevent the patient from returning to work full time in a timely fashion. Delays in recovery caused by workers compensation for patients with similar injury have also lead to significant financial settlements for the patients should a legal dispute arise. I have counseled the patient on all of the above.  Please be advised that I do not determine MMI and will update in the assessment whether patient is improving or not, or has plateaued to the point of  permanent symptoms despite successfully following recommended treatment by myself  Please be advised that the above work status is re-evaluated each visit and that the nature of the patient's injury described in the assessment and diagnoses do not have a standard or expected timeline for recovery as demonstrated in multiple up to date peer-reviewed publications  Please be advised that I do not perform FCEs. I will also not answer or comment on any FCE questions.  Please be advised I will not fill out additional paperwork that asks me to restate diagnoses, plan of care, work status, and/or accommodations as these are all documented in my clinic note.  Please be advised I will not fill out paperwork asking me to agree with a workers compensation representative's interpretation of my plan of care and/or evaluation of the patient as I stand by what is documented in my clinic note.  Please be advised that any peer to peer requests made on the behalf of workers compensation will need to be scheduled based on my availability and will be completed by myself within 1 week of the request. My clinic is not capable of doing on demand peer to peer requests with less than 48 hours of notice.

## 2023-09-11 NOTE — PROGRESS NOTES
Chief Complaint: Head Injury    Subjective:     History of Present Illness    Referring Provider: ED  Date of Injury: 6/13/23  Accompanied by: Daughter  Workers Comp    09/11/2023: Carlin Madrigal is a 68 y.o. male with h/o DM, HTN, HLD who presents for concussion evaluation. 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 Currently, headaches are right occipital, can last all day, 2-3 times a week, pounding. Per daughter, he has described head as bloated. He has a scar on right side of neck from the above injury. He has lower neck pain that is new since above injury. He has associated phonophobia per daughter, weakness in legs, imbalance that is new since above injury, spinning that was present prior to injury that is worse since above injury. He denies photophobia, numbness, tingling, lightheadedness, N/V. He has been having dry mouth. He notes fluctuating vision from diabetes and denies vision changes since above injury. He denies changes in taste, smell, hearing, other vision changes, appetite. He denies tinnitus. Per daughter, he gets bilateral jaw pain for the last 3 weeks that is sharp that is new since above injury and has looked swollen when eating before. He denies cognitive fogginess, concentration difficulties, and memory changes. He is sleeping well and wakes up rested. He has daytime fatigue. He snores and denies it worsening since above injury and denies apnea. He denies positional component but headache worsens when lays on right side and vasal vagal maneuvers do not significantly worsen headaches. He denies headaches waking him up and  does not wake up with headaches. Mood is okay and per daughter he is easily irritable since the above injury that he agrees with. He denies emotional lability. He has only tried Tylenol. He was just prescribed PT to help with headaches. He denies other prior head and neck injuries. Prior to current injury, headaches would be once every 2 weeks from being out in the sun and no associated photophobia, phonophobia, weakness, numbness, tingling, N/V, change in vision, aura and would not stop ADLs. Glucose most recently 124 and has been 160s and 180s.    Per ED note dated 6/13/23, per daughter he passed out at work today per coworkers and upon awakening could not remember where he was or what happened, he reports becoming dizzy but does not remember passing out    Per ED note dated 5/25/23, he has had 1 syncopal episode, has had intermittent blurred vision and dizziness described as lightheadedness that started 1 week ago, 1 week of right shoulder pain, and neck pain that began 2 weeks ago and denies trauma or injuries to the area and was sudden onset.    Current Outpatient Medications on File Prior to Visit   Medication Sig Dispense Refill    amLODIPine (NORVASC) 5 MG tablet Take 1 tablet (5 mg total) by mouth once daily. 30 tablet 1    diclofenac sodium (VOLTAREN) 1 % Gel Apply 2 g topically 3 (three) times daily. 100 g 0    gabapentin (NEURONTIN) 300 MG capsule Take 300 mg by mouth 3 (three) times daily.      lisinopriL (PRINIVIL,ZESTRIL) 40 MG tablet Take 40 mg by mouth Daily.      metFORMIN (GLUCOPHAGE) 1000 MG tablet Take 1,000 mg by mouth 2 (two) times a day.      NOVOLIN 70/30 U-100 INSULIN 100 unit/mL (70-30) injection Inject 15 Units into the skin 2 (two) times a day.      pravastatin (PRAVACHOL) 20 MG tablet Take 20 mg by mouth Daily.      [DISCONTINUED] levothyroxine (SYNTHROID) 50 MCG tablet Take 50 mcg by mouth Daily.      [DISCONTINUED] LIDOcaine (LIDODERM) 5 % Place 1 patch onto the skin daily as needed.  Remove & Discard patch within 12 hours or as directed by MD 30 patch 0     No current facility-administered medications on file prior to visit.       Review of patient's allergies indicates:  No Known Allergies    History reviewed. No pertinent family history.    Social History     Tobacco Use    Smoking status: Former     Types: Cigarettes     Passive exposure: Never    Smokeless tobacco: Never   Substance Use Topics    Alcohol use: Not Currently     Comment: 06/13/23: occ    Drug use: Not Currently       Review of Systems  Constitutional: No fevers, no chills, no change in weight  Eye/Vision: See HPI  Ear/Nose/Mouth/Throat: See HPI; cough sometimes, no runny nose, no sore throat  Respiratory: No shortness of breath, no problems breathing  Cardiovascular: No chest pain  Gastrointestinal: See HPI, no diarrhea, no constipation  Genitourinary: No dysuria  Musculoskeletal: See HPI  Integumentary: No skin changes  Neurologic: See HPI  Psychiatric: Denies depression, denies anxiety, denies SI and HI.  Additional System Information:    Objective:     Vitals:    09/11/23 1504   BP: 125/79   Pulse: 99       General: Alert and awake, Well nourished, Well groomed, No acute distress, no photophobia with 60 Hz hypersensitivity.  Eyes: Pupils are equal, round and reactive to light; Extraocular movements are intact; Normal conjunctiva; no nystagmus; Visual fields are intact bilaterally in all cardinal directions; Head thrust negative left side and positive right side. VOR cancellation WNL  HENT: Normocephalic, Rinne test positive bilaterally, Oral mucosa is moist, No pharyngeal erythema.  Neck: Supple  Stiffness  Patient has occipital point tenderness over the bilateral greater and lesser occipital nerve with induction of headaches with jump sign and twitch response and referred pain to right base of skull: 2+   No high, medial cervical pain with lateral movement of C1 over C2 and with isometric neck flexion and extension  Fluid  "patient turnaround with concurrent neck movement in direction of torso movement.  Right paraspinal cervical muscle spasm present  Cardiovascular: Normal rate, Regular rhythm, No murmur, No edema; no carotid bruits noted.  Musculoskeletal: No swelling.  Spine/torso exam: Spine/ torso exam is within normal limits   Integumentary: Warm, Dry, Intact, No pallor, No rash.    Neurologic Exam  Mental Status: orientated to time, person, and place; good recent and remote memory; attention and concentration WNL; naming intact; adequate fund of knowledge. No aphasia or dysarthria. Repetition intact. Follows complex commands    Cranial Nerves: as above, V1-V3 temperature sensation WNL bilaterally, face symmetric, symmetrical palatal rise, SCM 5/5 bilaterally, tongue protrusion midline and movements WNL  saccadic intrusions of volitional ocular smooth pursuits  no saccadic dysmetria  no pain with sustained upgaze and convergence  no visual motion sensitivity/dizziness produced with rapid eye movements or neck movements  non-lateralizing Reis tuning fork exam  no convergence insufficiency with no diplopia developed > 5 " accommodation    Muscle Tone/Motor Function: Normal bulk and tone throughout. No drift. Normal rapidly alternating movements. No tremors. No abnormal movements                                                                                                          Right                   Left                                  Deltoid          5/5                      5/5                                  Biceps          5/5                      5/5                                  Triceps         5/5                      5/5                                  Iliopsoas       5/5                     5/5                                  Quadriceps   5/5                     5/5                                  Hamstring     5/5                     5/5                                  Dorsiflexion   5/5                   "   5/5    Sensory: Vibration sensation mildly decreased bilateral fingers and absent bilateral toes and moderately reduced bilateral ankles, Temperature sensation WNL x4, Negative Romberg, unsteady on tandem stance    Reflexes: Symmetrical DTR's, Biceps 2+, Brachioradialis 2+, Patellar 2+, No Wartenberg or Lhermitte    Coordination: No truncal ataxia. Finger to nose WNL bilaterally    Gait: Gait WNL, Heel to toe walking WNL    Labs:  Admission on 06/18/2023, Discharged on 06/19/2023   Component Date Value Ref Range Status    POCT Glucose 06/18/2023 178 (H)  70 - 110 mg/dL Final    POC Rapid COVID 06/18/2023 Negative  Negative Final     Acceptable 06/18/2023 Yes   Final    POC Molecular Influenza A Ag 06/18/2023 Negative  Negative, Not Reported Final    POC Molecular Influenza B Ag 06/18/2023 Negative  Negative, Not Reported Final     Acceptable 06/18/2023 Yes   Final    WBC 06/18/2023 9.99  3.90 - 12.70 K/uL Final    RBC 06/18/2023 4.84  4.60 - 6.20 M/uL Final    Hemoglobin 06/18/2023 14.7  14.0 - 18.0 g/dL Final    Hematocrit 06/18/2023 43.4  40.0 - 54.0 % Final    MCV 06/18/2023 90  82 - 98 fL Final    MCH 06/18/2023 30.4  27.0 - 31.0 pg Final    MCHC 06/18/2023 33.9  32.0 - 36.0 g/dL Final    RDW 06/18/2023 13.3  11.5 - 14.5 % Final    Platelets 06/18/2023 292  150 - 450 K/uL Final    MPV 06/18/2023 9.0 (L)  9.2 - 12.9 fL Final    Immature Granulocytes 06/18/2023 0.3  0.0 - 0.5 % Final    Gran # (ANC) 06/18/2023 6.2  1.8 - 7.7 K/uL Final    Immature Grans (Abs) 06/18/2023 0.03  0.00 - 0.04 K/uL Final    Comment: Mild elevation in immature granulocytes is non specific and   can be seen in a variety of conditions including stress response,   acute inflammation, trauma and pregnancy. Correlation with other   laboratory and clinical findings is essential.      Lymph # 06/18/2023 2.6  1.0 - 4.8 K/uL Final    Mono # 06/18/2023 1.1 (H)  0.3 - 1.0 K/uL Final    Eos # 06/18/2023 0.1  0.0 - 0.5  K/uL Final    Baso # 06/18/2023 0.04  0.00 - 0.20 K/uL Final    nRBC 06/18/2023 0  0 /100 WBC Final    Gran % 06/18/2023 61.6  38.0 - 73.0 % Final    Lymph % 06/18/2023 26.3  18.0 - 48.0 % Final    Mono % 06/18/2023 10.7  4.0 - 15.0 % Final    Eosinophil % 06/18/2023 0.7  0.0 - 8.0 % Final    Basophil % 06/18/2023 0.4  0.0 - 1.9 % Final    Differential Method 06/18/2023 Automated   Final    Sodium 06/18/2023 131 (L)  136 - 145 mmol/L Final    Potassium 06/18/2023 4.3  3.5 - 5.1 mmol/L Final    Chloride 06/18/2023 95  95 - 110 mmol/L Final    CO2 06/18/2023 26  23 - 29 mmol/L Final    Glucose 06/18/2023 171 (H)  70 - 110 mg/dL Final    BUN 06/18/2023 23  8 - 23 mg/dL Final    Creatinine 06/18/2023 1.2  0.5 - 1.4 mg/dL Final    Calcium 06/18/2023 9.4  8.7 - 10.5 mg/dL Final    Total Protein 06/18/2023 7.9  6.0 - 8.4 g/dL Final    Albumin 06/18/2023 3.4 (L)  3.5 - 5.2 g/dL Final    Total Bilirubin 06/18/2023 0.7  0.1 - 1.0 mg/dL Final    Comment: For infants and newborns, interpretation of results should be based  on gestational age, weight and in agreement with clinical  observations.    Premature Infant recommended reference ranges:  Up to 24 hours.............<8.0 mg/dL  Up to 48 hours............<12.0 mg/dL  3-5 days..................<15.0 mg/dL  6-29 days.................<15.0 mg/dL      Alkaline Phosphatase 06/18/2023 69  55 - 135 U/L Final    AST 06/18/2023 15  10 - 40 U/L Final    ALT 06/18/2023 11  10 - 44 U/L Final    Anion Gap 06/18/2023 10  8 - 16 mmol/L Final    eGFR 06/18/2023 >60  >60 mL/min/1.73 m^2 Final    CPK 06/18/2023 92  20 - 200 U/L Final    TSH 06/18/2023 1.386  0.400 - 4.000 uIU/mL Final    Specimen UA 06/18/2023 Urine, Clean Catch   Final    Color, UA 06/18/2023 Yellow  Yellow, Straw, Annie Final    Appearance, UA 06/18/2023 Clear  Clear Final    pH, UA 06/18/2023 6.0  5.0 - 8.0 Final    Specific Gravity, UA 06/18/2023 1.030  1.005 - 1.030 Final    Protein, UA 06/18/2023 1+ (A)  Negative Final     Comment: Recommend a 24 hour urine protein or a urine   protein/creatinine ratio if globulin induced proteinuria is  clinically suspected.      Glucose, UA 06/18/2023 1+ (A)  Negative Final    Ketones, UA 06/18/2023 Negative  Negative Final    Bilirubin (UA) 06/18/2023 Negative  Negative Final    Occult Blood UA 06/18/2023 1+ (A)  Negative Final    Nitrite, UA 06/18/2023 Negative  Negative Final    Urobilinogen, UA 06/18/2023 4.0-6.0 (A)  <2.0 EU/dL Final    Leukocytes, UA 06/18/2023 Negative  Negative Final    Troponin I 06/18/2023 0.039 (H)  0.000 - 0.026 ng/mL Final    Comment: The reference interval for Troponin I represents the 99th percentile   cutoff   for our facility and is consistent with 3rd generation assay   performance.      Magnesium 06/18/2023 1.6  1.6 - 2.6 mg/dL Final    BNP 06/18/2023 92  0 - 99 pg/mL Final    Values of less than 100 pg/ml are consistent with non-CHF populations.    RBC, UA 06/18/2023 3  0 - 4 /hpf Final    WBC, UA 06/18/2023 1  0 - 5 /hpf Final    Bacteria 06/18/2023 Rare  None-Occ /hpf Final    Hyaline Casts, UA 06/18/2023 0  0-1/lpf /lpf Final    Microscopic Comment 06/18/2023 SEE COMMENT   Final    Comment: Other formed elements not mentioned in the report are not   present in the microscopic examination.       POCT Glucose 06/18/2023 141 (H)  70 - 110 mg/dL Final    Troponin I 06/18/2023 0.052 (H)  0.000 - 0.026 ng/mL Final    Comment: The reference interval for Troponin I represents the 99th percentile   cutoff   for our facility and is consistent with 3rd generation assay   performance.      Sodium 06/19/2023 133 (L)  136 - 145 mmol/L Final    Potassium 06/19/2023 4.0  3.5 - 5.1 mmol/L Final    Chloride 06/19/2023 99  95 - 110 mmol/L Final    CO2 06/19/2023 21 (L)  23 - 29 mmol/L Final    Glucose 06/19/2023 168 (H)  70 - 110 mg/dL Final    BUN 06/19/2023 21  8 - 23 mg/dL Final    Creatinine 06/19/2023 1.0  0.5 - 1.4 mg/dL Final    Calcium 06/19/2023 9.1  8.7 - 10.5 mg/dL  Final    Anion Gap 06/19/2023 13  8 - 16 mmol/L Final    eGFR 06/19/2023 >60  >60 mL/min/1.73 m^2 Final    Troponin I 06/19/2023 0.028 (H)  0.000 - 0.026 ng/mL Final    Comment: The reference interval for Troponin I represents the 99th percentile   cutoff   for our facility and is consistent with 3rd generation assay   performance.      POCT Glucose 06/19/2023 138 (H)  70 - 110 mg/dL Final   Admission on 06/16/2023, Discharged on 06/16/2023   Component Date Value Ref Range Status    WBC 06/16/2023 7.58  3.90 - 12.70 K/uL Final    RBC 06/16/2023 4.65  4.60 - 6.20 M/uL Final    Hemoglobin 06/16/2023 14.0  14.0 - 18.0 g/dL Final    Hematocrit 06/16/2023 42.3  40.0 - 54.0 % Final    MCV 06/16/2023 91  82 - 98 fL Final    MCH 06/16/2023 30.1  27.0 - 31.0 pg Final    MCHC 06/16/2023 33.1  32.0 - 36.0 g/dL Final    RDW 06/16/2023 13.7  11.5 - 14.5 % Final    Platelets 06/16/2023 238  150 - 450 K/uL Final    MPV 06/16/2023 9.1 (L)  9.2 - 12.9 fL Final    Immature Granulocytes 06/16/2023 0.5  0.0 - 0.5 % Final    Gran # (ANC) 06/16/2023 4.6  1.8 - 7.7 K/uL Final    Immature Grans (Abs) 06/16/2023 0.04  0.00 - 0.04 K/uL Final    Comment: Mild elevation in immature granulocytes is non specific and   can be seen in a variety of conditions including stress response,   acute inflammation, trauma and pregnancy. Correlation with other   laboratory and clinical findings is essential.      Lymph # 06/16/2023 2.0  1.0 - 4.8 K/uL Final    Mono # 06/16/2023 0.8  0.3 - 1.0 K/uL Final    Eos # 06/16/2023 0.2  0.0 - 0.5 K/uL Final    Baso # 06/16/2023 0.04  0.00 - 0.20 K/uL Final    nRBC 06/16/2023 0  0 /100 WBC Final    Gran % 06/16/2023 60.2  38.0 - 73.0 % Final    Lymph % 06/16/2023 26.3  18.0 - 48.0 % Final    Mono % 06/16/2023 10.4  4.0 - 15.0 % Final    Eosinophil % 06/16/2023 2.1  0.0 - 8.0 % Final    Basophil % 06/16/2023 0.5  0.0 - 1.9 % Final    Differential Method 06/16/2023 Automated   Final    Sodium 06/16/2023 136  136 - 145  mmol/L Final    Potassium 06/16/2023 4.3  3.5 - 5.1 mmol/L Final    Chloride 06/16/2023 105  95 - 110 mmol/L Final    CO2 06/16/2023 24  23 - 29 mmol/L Final    Glucose 06/16/2023 151 (H)  70 - 110 mg/dL Final    BUN 06/16/2023 21  8 - 23 mg/dL Final    Creatinine 06/16/2023 1.0  0.5 - 1.4 mg/dL Final    Calcium 06/16/2023 9.6  8.7 - 10.5 mg/dL Final    Total Protein 06/16/2023 7.6  6.0 - 8.4 g/dL Final    Albumin 06/16/2023 3.3 (L)  3.5 - 5.2 g/dL Final    Total Bilirubin 06/16/2023 0.4  0.1 - 1.0 mg/dL Final    Comment: For infants and newborns, interpretation of results should be based  on gestational age, weight and in agreement with clinical  observations.    Premature Infant recommended reference ranges:  Up to 24 hours.............<8.0 mg/dL  Up to 48 hours............<12.0 mg/dL  3-5 days..................<15.0 mg/dL  6-29 days.................<15.0 mg/dL      Alkaline Phosphatase 06/16/2023 69  55 - 135 U/L Final    AST 06/16/2023 16  10 - 40 U/L Final    ALT 06/16/2023 10  10 - 44 U/L Final    Anion Gap 06/16/2023 7 (L)  8 - 16 mmol/L Final    eGFR 06/16/2023 >60  >60 mL/min/1.73 m^2 Final    Lactate (Lactic Acid) 06/16/2023 1.8  0.5 - 2.2 mmol/L Final    Comment: Falsely low lactic acid results can be found in samples   containing >=13.0 mg/dL total bilirubin and/or >=3.5 mg/dL   direct bilirubin.      CPK 06/16/2023 157  20 - 200 U/L Final   Admission on 06/13/2023, Discharged on 06/15/2023   Component Date Value Ref Range Status    WBC 06/13/2023 11.07  3.90 - 12.70 K/uL Final    RBC 06/13/2023 5.02  4.60 - 6.20 M/uL Final    Hemoglobin 06/13/2023 15.2  14.0 - 18.0 g/dL Final    Hematocrit 06/13/2023 46.5  40.0 - 54.0 % Final    MCV 06/13/2023 93  82 - 98 fL Final    MCH 06/13/2023 30.3  27.0 - 31.0 pg Final    MCHC 06/13/2023 32.7  32.0 - 36.0 g/dL Final    RDW 06/13/2023 13.8  11.5 - 14.5 % Final    Platelets 06/13/2023 258  150 - 450 K/uL Final    MPV 06/13/2023 9.5  9.2 - 12.9 fL Final    Immature  Granulocytes 06/13/2023 0.6 (H)  0.0 - 0.5 % Final    Gran # (ANC) 06/13/2023 7.3  1.8 - 7.7 K/uL Final    Immature Grans (Abs) 06/13/2023 0.07 (H)  0.00 - 0.04 K/uL Final    Comment: Mild elevation in immature granulocytes is non specific and   can be seen in a variety of conditions including stress response,   acute inflammation, trauma and pregnancy. Correlation with other   laboratory and clinical findings is essential.      Lymph # 06/13/2023 2.7  1.0 - 4.8 K/uL Final    Mono # 06/13/2023 0.8  0.3 - 1.0 K/uL Final    Eos # 06/13/2023 0.2  0.0 - 0.5 K/uL Final    Baso # 06/13/2023 0.06  0.00 - 0.20 K/uL Final    nRBC 06/13/2023 0  0 /100 WBC Final    Gran % 06/13/2023 65.9  38.0 - 73.0 % Final    Lymph % 06/13/2023 24.1  18.0 - 48.0 % Final    Mono % 06/13/2023 7.4  4.0 - 15.0 % Final    Eosinophil % 06/13/2023 1.5  0.0 - 8.0 % Final    Basophil % 06/13/2023 0.5  0.0 - 1.9 % Final    Differential Method 06/13/2023 Automated   Final    Sodium 06/13/2023 139  136 - 145 mmol/L Final    Potassium 06/13/2023 5.8 (H)  3.5 - 5.1 mmol/L Final    Chloride 06/13/2023 108  95 - 110 mmol/L Final    CO2 06/13/2023 17 (L)  23 - 29 mmol/L Final    Glucose 06/13/2023 248 (H)  70 - 110 mg/dL Final    BUN 06/13/2023 35 (H)  8 - 23 mg/dL Final    Creatinine 06/13/2023 2.3 (H)  0.5 - 1.4 mg/dL Final    Calcium 06/13/2023 10.2  8.7 - 10.5 mg/dL Final    Total Protein 06/13/2023 8.1  6.0 - 8.4 g/dL Final    Albumin 06/13/2023 4.1  3.5 - 5.2 g/dL Final    Total Bilirubin 06/13/2023 0.4  0.1 - 1.0 mg/dL Final    Comment: For infants and newborns, interpretation of results should be based  on gestational age, weight and in agreement with clinical  observations.    Premature Infant recommended reference ranges:  Up to 24 hours.............<8.0 mg/dL  Up to 48 hours............<12.0 mg/dL  3-5 days..................<15.0 mg/dL  6-29 days.................<15.0 mg/dL      Alkaline Phosphatase 06/13/2023 82  55 - 135 U/L Final    AST  06/13/2023 20  10 - 40 U/L Final    ALT 06/13/2023 16  10 - 44 U/L Final    Anion Gap 06/13/2023 14  8 - 16 mmol/L Final    eGFR 06/13/2023 31 (A)  >60 mL/min/1.73 m^2 Final    Troponin I 06/13/2023 0.017  0.000 - 0.026 ng/mL Final    Comment: The reference interval for Troponin I represents the 99th percentile   cutoff   for our facility and is consistent with 3rd generation assay   performance.      Specimen UA 06/13/2023 Urine, Clean Catch   Final    Color, UA 06/13/2023 Yellow  Yellow, Straw, Annie Final    Appearance, UA 06/13/2023 Hazy (A)  Clear Final    pH, UA 06/13/2023 5.0  5.0 - 8.0 Final    Specific Gravity, UA 06/13/2023 1.025  1.005 - 1.030 Final    Protein, UA 06/13/2023 Trace (A)  Negative Final    Comment: Recommend a 24 hour urine protein or a urine   protein/creatinine ratio if globulin induced proteinuria is  clinically suspected.      Glucose, UA 06/13/2023 1+ (A)  Negative Final    Ketones, UA 06/13/2023 Negative  Negative Final    Bilirubin (UA) 06/13/2023 Negative  Negative Final    Occult Blood UA 06/13/2023 Negative  Negative Final    Nitrite, UA 06/13/2023 Negative  Negative Final    Urobilinogen, UA 06/13/2023 Negative  <2.0 EU/dL Final    Leukocytes, UA 06/13/2023 Negative  Negative Final    CPK 06/13/2023 130  20 - 200 U/L Final    Magnesium 06/13/2023 1.7  1.6 - 2.6 mg/dL Final    POCT Glucose 06/13/2023 159 (H)  70 - 110 mg/dL Final    Sodium 06/14/2023 138  136 - 145 mmol/L Final    Potassium 06/14/2023 4.9  3.5 - 5.1 mmol/L Final    Chloride 06/14/2023 108  95 - 110 mmol/L Final    CO2 06/14/2023 21 (L)  23 - 29 mmol/L Final    Glucose 06/14/2023 200 (H)  70 - 110 mg/dL Final    BUN 06/14/2023 29 (H)  8 - 23 mg/dL Final    Creatinine 06/14/2023 1.1  0.5 - 1.4 mg/dL Final    Calcium 06/14/2023 8.8  8.7 - 10.5 mg/dL Final    Total Protein 06/14/2023 7.1  6.0 - 8.4 g/dL Final    Albumin 06/14/2023 3.5  3.5 - 5.2 g/dL Final    Total Bilirubin 06/14/2023 0.6  0.1 - 1.0 mg/dL Final     Comment: For infants and newborns, interpretation of results should be based  on gestational age, weight and in agreement with clinical  observations.    Premature Infant recommended reference ranges:  Up to 24 hours.............<8.0 mg/dL  Up to 48 hours............<12.0 mg/dL  3-5 days..................<15.0 mg/dL  6-29 days.................<15.0 mg/dL      Alkaline Phosphatase 06/14/2023 70  55 - 135 U/L Final    AST 06/14/2023 21  10 - 40 U/L Final    ALT 06/14/2023 14  10 - 44 U/L Final    Anion Gap 06/14/2023 9  8 - 16 mmol/L Final    eGFR 06/14/2023 >60  >60 mL/min/1.73 m^2 Final    POCT Glucose 06/14/2023 185 (H)  70 - 110 mg/dL Final    Hemoglobin A1C 06/13/2023 7.8 (H)  4.0 - 5.6 % Final    Comment: ADA Screening Guidelines:  5.7-6.4%  Consistent with prediabetes  >or=6.5%  Consistent with diabetes    High levels of fetal hemoglobin interfere with the HbA1C  assay. Heterozygous hemoglobin variants (HbS, HgC, etc)do  not significantly interfere with this assay.   However, presence of multiple variants may affect accuracy.      Estimated Avg Glucose 06/13/2023 177 (H)  68 - 131 mg/dL Final    POCT Glucose 06/14/2023 197 (H)  70 - 110 mg/dL Final    BSA 06/14/2023 2.14  m2 Final    TDI SEPTAL 06/14/2023 0.09  m/s Final    LV LATERAL E/E' RATIO 06/14/2023 7.42  m/s Final    LV SEPTAL E/E' RATIO 06/14/2023 9.89  m/s Final    LA WIDTH 06/14/2023 4.00  cm Final    Left Ventricular Outflow Tract Imani* 06/14/2023 0.62  cm/s Final    Left Ventricular Outflow Tract Imani* 06/14/2023 1.64  mmHg Final    AORTIC VALVE CUSP SEPERATION 06/14/2023 1.70  cm Final    TDI LATERAL 06/14/2023 0.12  m/s Final    LVIDd 06/14/2023 4.73  3.5 - 6.0 cm Final    IVS 06/14/2023 0.93  0.6 - 1.1 cm Final    Posterior Wall 06/14/2023 0.96  0.6 - 1.1 cm Final    Ao root annulus 06/14/2023 2.48  cm Final    LVIDs 06/14/2023 2.87  2.1 - 4.0 cm Final    FS 06/14/2023 39  28 - 44 % Final    LA volume 06/14/2023 61.17  cm3 Final    Sinus  06/14/2023 2.91  cm Final    STJ 06/14/2023 2.81  cm Final    LV mass 06/14/2023 153.93  g Final    LA size 06/14/2023 4.02  cm Final    RVDD 06/14/2023 3.99  cm Final    TAPSE 06/14/2023 2.24  cm Final    Left Ventricle Relative Wall Thick* 06/14/2023 0.41  cm Final    AV mean gradient 06/14/2023 6  mmHg Final    AV valve area 06/14/2023 3.61  cm2 Final    AV Velocity Ratio 06/14/2023 0.51   Final    AV index (prosthetic) 06/14/2023 0.78   Final    MV valve area p 1/2 method 06/14/2023 3.10  cm2 Final    E/A ratio 06/14/2023 0.97   Final    Mean e' 06/14/2023 0.11  m/s Final    E wave deceleration time 06/14/2023 244.71  msec Final    LVOT diameter 06/14/2023 2.43  cm Final    LVOT area 06/14/2023 4.6  cm2 Final    LVOT peak rivas 06/14/2023 0.83  m/s Final    LVOT peak VTI 06/14/2023 22.60  cm Final    Ao peak rivas 06/14/2023 1.62  m/s Final    Ao VTI 06/14/2023 29.0  cm Final    LVOT stroke volume 06/14/2023 104.76  cm3 Final    AV peak gradient 06/14/2023 10  mmHg Final    E/E' ratio 06/14/2023 8.48  m/s Final    MV Peak E Rivas 06/14/2023 0.89  m/s Final    MV stenosis pressure 1/2 time 06/14/2023 70.97  ms Final    MV Peak A Rivas 06/14/2023 0.92  m/s Final    LV Systolic Volume 06/14/2023 31.43  mL Final    LV Systolic Volume Index 06/14/2023 14.9  mL/m2 Final    LV Diastolic Volume 06/14/2023 103.91  mL Final    LV Diastolic Volume Index 06/14/2023 49.25  mL/m2 Final    LA Volume Index 06/14/2023 29.0  mL/m2 Final    LV Mass Index 06/14/2023 73  g/m2 Final    RA Major Axis 06/14/2023 4.66  cm Final    Left Atrium Minor Axis 06/14/2023 4.05  cm Final    Left Atrium Major Axis 06/14/2023 5.00  cm Final    RA Width 06/14/2023 4.79  cm Final    Right Atrial Pressure (from IVC) 06/14/2023 3  mmHg Final    EF 06/14/2023 60  % Final    POCT Glucose 06/14/2023 195 (H)  70 - 110 mg/dL Final    WBC 06/14/2023 10.09  3.90 - 12.70 K/uL Final    RBC 06/14/2023 4.59 (L)  4.60 - 6.20 M/uL Final    Hemoglobin 06/14/2023 14.0  14.0  - 18.0 g/dL Final    Hematocrit 06/14/2023 43.0  40.0 - 54.0 % Final    MCV 06/14/2023 94  82 - 98 fL Final    MCH 06/14/2023 30.5  27.0 - 31.0 pg Final    MCHC 06/14/2023 32.6  32.0 - 36.0 g/dL Final    RDW 06/14/2023 14.3  11.5 - 14.5 % Final    Platelets 06/14/2023 225  150 - 450 K/uL Final    MPV 06/14/2023 8.9 (L)  9.2 - 12.9 fL Final    Immature Granulocytes 06/14/2023 0.3  0.0 - 0.5 % Final    Gran # (ANC) 06/14/2023 7.1  1.8 - 7.7 K/uL Final    Immature Grans (Abs) 06/14/2023 0.03  0.00 - 0.04 K/uL Final    Comment: Mild elevation in immature granulocytes is non specific and   can be seen in a variety of conditions including stress response,   acute inflammation, trauma and pregnancy. Correlation with other   laboratory and clinical findings is essential.      Lymph # 06/14/2023 2.1  1.0 - 4.8 K/uL Final    Mono # 06/14/2023 0.9  0.3 - 1.0 K/uL Final    Eos # 06/14/2023 0.1  0.0 - 0.5 K/uL Final    Baso # 06/14/2023 0.03  0.00 - 0.20 K/uL Final    nRBC 06/14/2023 0  0 /100 WBC Final    Gran % 06/14/2023 70.1  38.0 - 73.0 % Final    Lymph % 06/14/2023 20.3  18.0 - 48.0 % Final    Mono % 06/14/2023 8.4  4.0 - 15.0 % Final    Eosinophil % 06/14/2023 0.6  0.0 - 8.0 % Final    Basophil % 06/14/2023 0.3  0.0 - 1.9 % Final    Differential Method 06/14/2023 Automated   Final    POCT Glucose 06/14/2023 171 (H)  70 - 110 mg/dL Final    Sodium 06/15/2023 134 (L)  136 - 145 mmol/L Final    Potassium 06/15/2023 4.8  3.5 - 5.1 mmol/L Final    Chloride 06/15/2023 102  95 - 110 mmol/L Final    CO2 06/15/2023 24  23 - 29 mmol/L Final    Glucose 06/15/2023 183 (H)  70 - 110 mg/dL Final    BUN 06/15/2023 19  8 - 23 mg/dL Final    Creatinine 06/15/2023 1.0  0.5 - 1.4 mg/dL Final    Calcium 06/15/2023 8.8  8.7 - 10.5 mg/dL Final    Total Protein 06/15/2023 7.2  6.0 - 8.4 g/dL Final    Albumin 06/15/2023 3.3 (L)  3.5 - 5.2 g/dL Final    Total Bilirubin 06/15/2023 0.9  0.1 - 1.0 mg/dL Final    Comment: For infants and newborns,  interpretation of results should be based  on gestational age, weight and in agreement with clinical  observations.    Premature Infant recommended reference ranges:  Up to 24 hours.............<8.0 mg/dL  Up to 48 hours............<12.0 mg/dL  3-5 days..................<15.0 mg/dL  6-29 days.................<15.0 mg/dL      Alkaline Phosphatase 06/15/2023 64  55 - 135 U/L Final    AST 06/15/2023 20  10 - 40 U/L Final    ALT 06/15/2023 9 (L)  10 - 44 U/L Final    Anion Gap 06/15/2023 8  8 - 16 mmol/L Final    eGFR 06/15/2023 >60  >60 mL/min/1.73 m^2 Final    POCT Glucose 06/15/2023 180 (H)  70 - 110 mg/dL Final    POCT Glucose 06/15/2023 161 (H)  70 - 110 mg/dL Final   Admission on 05/25/2023, Discharged on 05/25/2023   Component Date Value Ref Range Status    WBC 05/25/2023 8.05  3.90 - 12.70 K/uL Final    RBC 05/25/2023 4.60  4.60 - 6.20 M/uL Final    Hemoglobin 05/25/2023 13.9 (L)  14.0 - 18.0 g/dL Final    Hematocrit 05/25/2023 42.6  40.0 - 54.0 % Final    MCV 05/25/2023 93  82 - 98 fL Final    MCH 05/25/2023 30.2  27.0 - 31.0 pg Final    MCHC 05/25/2023 32.6  32.0 - 36.0 g/dL Final    RDW 05/25/2023 13.6  11.5 - 14.5 % Final    Platelets 05/25/2023 249  150 - 450 K/uL Final    MPV 05/25/2023 9.2  9.2 - 12.9 fL Final    Immature Granulocytes 05/25/2023 0.1  0.0 - 0.5 % Final    Gran # (ANC) 05/25/2023 3.9  1.8 - 7.7 K/uL Final    Immature Grans (Abs) 05/25/2023 0.01  0.00 - 0.04 K/uL Final    Comment: Mild elevation in immature granulocytes is non specific and   can be seen in a variety of conditions including stress response,   acute inflammation, trauma and pregnancy. Correlation with other   laboratory and clinical findings is essential.      Lymph # 05/25/2023 3.1  1.0 - 4.8 K/uL Final    Mono # 05/25/2023 0.8  0.3 - 1.0 K/uL Final    Eos # 05/25/2023 0.2  0.0 - 0.5 K/uL Final    Baso # 05/25/2023 0.04  0.00 - 0.20 K/uL Final    nRBC 05/25/2023 0  0 /100 WBC Final    Gran % 05/25/2023 48.1  38.0 - 73.0 %  Final    Lymph % 05/25/2023 38.9  18.0 - 48.0 % Final    Mono % 05/25/2023 9.8  4.0 - 15.0 % Final    Eosinophil % 05/25/2023 2.6  0.0 - 8.0 % Final    Basophil % 05/25/2023 0.5  0.0 - 1.9 % Final    Differential Method 05/25/2023 Automated   Final    Sodium 05/25/2023 139  136 - 145 mmol/L Final    Potassium 05/25/2023 4.4  3.5 - 5.1 mmol/L Final    Chloride 05/25/2023 105  95 - 110 mmol/L Final    CO2 05/25/2023 23  23 - 29 mmol/L Final    Glucose 05/25/2023 130 (H)  70 - 110 mg/dL Final    BUN 05/25/2023 30 (H)  8 - 23 mg/dL Final    Creatinine 05/25/2023 1.4  0.5 - 1.4 mg/dL Final    Calcium 05/25/2023 9.3  8.7 - 10.5 mg/dL Final    Total Protein 05/25/2023 7.7  6.0 - 8.4 g/dL Final    Albumin 05/25/2023 3.9  3.5 - 5.2 g/dL Final    Total Bilirubin 05/25/2023 0.2  0.1 - 1.0 mg/dL Final    Comment: For infants and newborns, interpretation of results should be based  on gestational age, weight and in agreement with clinical  observations.    Premature Infant recommended reference ranges:  Up to 24 hours.............<8.0 mg/dL  Up to 48 hours............<12.0 mg/dL  3-5 days..................<15.0 mg/dL  6-29 days.................<15.0 mg/dL      Alkaline Phosphatase 05/25/2023 64  55 - 135 U/L Final    AST 05/25/2023 19  10 - 40 U/L Final    ALT 05/25/2023 15  10 - 44 U/L Final    Anion Gap 05/25/2023 11  8 - 16 mmol/L Final    eGFR 05/25/2023 55 (A)  >60 mL/min/1.73 m^2 Final    BNP 05/25/2023 21  0 - 99 pg/mL Final    Values of less than 100 pg/ml are consistent with non-CHF populations.    Troponin I 05/25/2023 0.008  0.000 - 0.026 ng/mL Final    Comment: The reference interval for Troponin I represents the 99th percentile   cutoff   for our facility and is consistent with 3rd generation assay   performance.        I personally reviewed all current labs noted in today's chart.    Imaging:  I personally reviewed all diagnostic images and reports and agree with findings in the radiographical report as noted below  unless otherwise specified.    CT Head 6/14/23:  FINDINGS:  The ventricular system is normal in size and not distorted by mass effect.  Within the inferior left frontal region is a moderate-sized region of parenchymal hypoattenuation.  Several small foci of hyperattenuation are extra-axial about the left frontal lobe compatible with subarachnoid hemorrhage with additional more punctate foci of hyperattenuation within the area of abnormal parenchyma in the left frontal lobe, most compatible with small foci of hemorrhage.  The minimal hyperattenuation along the right frontoparietal region is likely additional small foci of extra-axial subarachnoid hemorrhage.     Visualized paranasal sinuses demonstrate moderate fluid in the right sphenoid sinus with trace mucosal membrane thickening in the left sphenoid sinus.     The osseous structures show no significant abnormality.     Impression:     Inferior left frontal contusion with associated small foci of parenchymal and subarachnoid hemorrhage.  Some minimal additional subarachnoid hemorrhage is present along the right posterior frontoparietal region.    My read: Left frontal lobe contusion with small IPH and SAH and small right frontoparietal SAH present, normal sella    CT Head 6/14/23:  FINDINGS:  Intracranial compartment:     No midline shift or hydrocephalus.     Persistence cortical edema in the inferior left frontal lobe with minimal associated parenchymal and subarachnoid hemorrhage, similar to the earlier study.     No definite mass is identified.  MRI with without contrast would better characterize.     Skull/extracranial contents (limited evaluation): No fracture. Mild bilateral ethmoid, right maxillary and right sphenoid sinus disease.     Impression:     1. No significant change from the earlier study.  2. Inferior left frontal parenchymal contusion with mild associated edema and minimal parenchymal and subarachnoid hemorrhage.  Findings are similar to the  prior study.  Recommend surveillance/follow-up.  See above comments.  3. Paranasal sinus disease.  4.  This report was flagged in Epic as abnormal.    My read: Left frontal lobe contusion with small IPH and SAH and small right frontoparietal SAH present, normal sella    CT Head 6/15/23:  FINDINGS:  Relative to prior head CT 06/14/2023, 16:38 hours, no acute detrimental change.  Continued maturing bifrontal hemorrhagic, left larger than right, and anterior left temporal lobe hemorrhagic contusions.  Scattered relatively small volume subarachnoid hemorrhage, similar in volume.  Questioned thin parafalcine subdural blood without substantial interval change.     Overall, no definite new or enlarging areas of intracranial hemorrhage appreciated.  No evidence of acute transcortical infarct by noncontrast CT.  Similar size and configuration of ventricles.  No evidence of worsening mass effect or new herniation syndrome.     Remainder of examination not substantially changed.     Impression:     1. No acute detrimental change relative prior head CT 06/14/2023, 16:38 hours.  2. Maturing bifrontal and left temporal lobe hemorrhagic contusions.  Similar relatively small volume subarachnoid hemorrhage and questioned thin parafalcine subdural blood essentially unchanged.  3. No new or enlarging areas of intracranial hemorrhage or worsening mass effect appreciated.    My read: Bilateral frontal lobe contusions and and left temporal lobe contusions. Small SAH present.    MRI Brain 6/15/23:  FINDINGS:  Pituitary, craniocervical junction, and midline structures are unremarkable.  There are bifrontal hemorrhagic contusions.  There is left temporal hemorrhagic contusion.  There is right parietooccipital junction hemorrhagic contusion.  There is a right cerebellar hemorrhagic contusion.  These are unchanged from the prior CT dated 06/15/2023.  There is no evidence of diffuse axonal injury or shear injury.  There is a small amount of  thin subdural blood along the falx and tentorium.  There is no midline shift or herniation.  There is some baseline mild small vessel ischemic change.  There may be a small amount of subarachnoid blood.     Impression:     No mass effect or herniation seen.  There is no evidence of diffuse axonal injury or shear injury.     Multifocal hemorrhagic contusions, subarachnoid blood, and subdural blood without mass effect.    My read: Bifrontal and left temporal and right parietoccipital and right cerebellar hemorrhagic contusions. Small SDH in falx. Normal sella    CT Head 6/16/23:  FINDINGS:  Relative to prior CT and MR imaging performed 06/15/2023, there is continued maturation of dominant hemorrhagic contusion in the left frontal lobe, now mostly hypoattenuating.  Evolving smaller hemorrhagic contusions in the inferior right frontal and anterior inferior left temporal lobes.  Small focus of hypoattenuation in the right cerebellum and right parieto-occipital junction, at site of additional small hemorrhagic contusions.     Decreasing small volume subarachnoid hemorrhage.  Minimal mixed attenuation left subdural collection measures on the order-1-2 mm similar to prior MR of 06/15/2023.  Decreased conspicuity of previously demonstrated parafalcine and tentorial subdural blood no new mass effect.     No definite new or enlarging areas of intracranial hemorrhage appreciated.     No evidence of acute transcortical infarct by noncontrast CT.  Grossly unchanged size and configuration of the ventricles.  No evidence of worsening mass effect or new herniation syndrome appreciated.     Background of generalized age-related parenchymal volume loss noted.  Areas of nonspecific white matter hypoattenuation may relate to sequela of chronic small vessel ischemic disease.  Orbital contents grossly unremarkable.  No acute findings involving the scalp soft tissues.  No acute change involving the visualized osseous structures.      Scattered paranasal sinus mucosal thickening retention cysts.     Impression:     1. No definite acute detrimental change relative to prior CT and MR imaging performed 06/15/2023.  2. Continued maturing multifocal hemorrhagic parenchymal contusions and decrease in conspicuity of posttraumatic subarachnoid and subdural blood.  No new or enlarging areas of intracranial hemorrhage or worsening mass effect appreciated.    My read: Bilateral frontal lobe contusions and and left temporal lobe contusions. Small SAH present.    MRI Brain 7/11/23:  FINDINGS:  Continued temporal evolution of known hemorrhagic contusions involving the bilateral inferior frontal lobes left greater than right and left anterior temporal lobe.  There are some serpiginous areas of enhancement involving the left frontal contusion.  Contrast enhancement can be seen the setting of subacute cerebral contusions.  There is pachymeningeal enhancement and suspected trace extra-axial fluid over the left frontal lobe.  No new intracranial hemorrhage or acute major vascular territory infarct.  No midline shift or hydrocephalus.  Structures in the sellar region and craniocervical junction show no significant abnormalities.  The major skull base flow voids are present.  Mild mucosal membrane thickening in the paranasal sinuses.  Mastoid air cells are essentially clear.     Impression:     Continued temporal evolution/maturation of patient's known hemorrhagic contusions involving the inferior frontal lobes and left anterior temporal lobe.  No new intracranial hemorrhage, worsening mass effect or acute major vascular territory infarct.    My read: Evolution of bifrontal and left temporal lobe hemorraghic contusions    Assessment:       ICD-10-CM ICD-9-CM    1. Concussion with loss of consciousness, sequela  S06.0X9S 907.0       2. Subarachnoid hemorrhage following injury, with loss of consciousness, sequela  S06.6X9S 907.0       3. Cervicalgia of  wyahrior-ahgacim-mylad region  M54.2 723.1       4. Cervicogenic headache  G44.86 784.0       5. Occipital neuritis  M54.81 723.8       6. Fatigue due to sleep pattern disturbance  R53.83 780.79     G47.9 780.50       7. Imbalance  R26.89 781.2       8. Whiplash injury to neck, initial encounter  S13.4XXA 847.0       9. Saccadic eye movement deficiency  H55.81 379.57           68 y.o. male with h/o DM, HTN, HLD who presents for concussion evaluation. Imaging from 6/13/23 injury revealed bilateral frontal and left temporal and right parietoccipital and right cerebellar hemorrhagic contusions with small amount of thin subdural blood along the falx and tentorium with left frontal lobe SAH and small SAH in right posterior frontoparietal region. On exam, he has occipital point tenderness over the bilateral greater and lesser occipital nerve with induction of headaches with jump sign and twitch response and referred pain to right base of skull, imbalance, saccadic intrusions, vibratory loss in fingers and distal lower extremities, positive right head thrust. We discussed that he had a TBI given imaging evidence. We discussed based on imaging evidence, he may have problems with memory and executive functioning but he is not complaining of those functions at this time. We discussed that there is currently no universally accepted definition of concussion. We discussed that concussion is a traumatic brain injury. We discussed that concussion can occur as a result of an impact to the head or to the body. We discussed that our clinic considers concussion definitive if there is transient disruption of brain activity such as with loss of consciousness, amnesia as it relates to the day of the injury, or reports of neurological dysfunction on the day of injury. We discussed typical course, signs & symptoms, diagnostic findings, and treatment for concussion. Patient's exam and symptoms are the result of a kinetic force injury with  resultant cranio cervical trauma and occipital neuritis. We discussed at length about the anatomy, symptomology, and exam findings of occipital neuritis. We discussed the risks vs benefits of waiting vs acute therapy for occipital neuritis in that there is a risk of waiting for treatment in that permanent nerve damage could result as the nerve is chronically scarred into the muscle but there is no way to predict whether damage has already occurred until we start the treatment plan as described below. We discussed that head and/or neck injury can result in pain as well as cognitive, mood, and sleep disruption. We discussed that pain disturbances can disrupt sleep, cognition, and mood. We discussed that sleep disturbances can disrupt cognition, mood, and worsen pain. We discussed that mood disturbances can disrupt sleep, cognition, and worsen pain. Counseled the patient that steroids suppress the immune response, which means that they are at increased risk for radha bacterial or viral infections including COVID19 and if they were to become infected, they may have a more severe disease course. We discussed that any form of steroids including oral or injectable should not be taken within 2 weeks of COVID19 vaccination/booster. The patient has elected to take steroids. The patient's last COVID19 vaccination/booster was more than 2 weeks ago. We discussed exam also indicative of long fiber neuropathy that is likely 2/2 to DM and not related to his injury.    Plan:     Medrol dose pack prescribed. Take as instructed on package insert.  Monitor sugar while on Medrol and if goes greater than 200, stop it and please contact me  The patient was instructed to ice the occipital region for no more than 20 minutes at least once a day but may repeat this as many times as they would like.   Discussed ergonomic accommodations for occipital neuritis/neuralgia. Mainly perform all work at eye level to minimize continued neck  flexion which will aggravate the nerve.  Patient was encouraged to do daily light cardio exercise but instructed to limit physical activities to walking, walking in water up to the waist only or riding a stationary bike, recumbent preferred. No weight lifting in upper body, no neck massage, no acupuncture of neck, and no dry needling of upper neck. No neck PT unless otherwise stated. No chiropractor work on neck. Lower body strengthening with resistant bands, leg machines, and strapping weights to legs okay. No core body workouts. No running or use of cardio equipment other than stationary bike. No swimming or body surfing. No amusement park rides. No lifting more than 5-10 lbs and bend at the knees, not the waist.  Discussed care plan in detail for post traumatic occipital neuritis including a trial of oral medications followed by series of trigger point steroid injections with occipital nerve blocks. To be referred for consultation for occipital nerve release procedure if initially clinically responsive to injections but always with a return of symptoms.  Referral for vestibular PT for balance and eye movements  Do not start neck PT at this time  You do not feel your feet well due to neuropathy. Please wear footwear with good tread at all times. This recommendation includes wearing socks with tread on them. Make sure all of your walkways, hallways, and julio are well lit at all times day and night. Use night lights to light up commonly used pathways in your home, meaning from the bedroom to the bathroom or kitchen. Make sure all of your walkways, hallways, and julio are clear of obstacles at all times. Obstacles include decorations, rugs/mats, pet beds, shoes, and clothes. Make sure you have good hand holds to grab onto as you walk around your home.  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 Delays in recovery may cause or further worsen any underlying permanent injury that was caused by the result of the patient's injury. Delays in recovery may also cause the development of new medical conditions that will then need to be treated. The development of some forms of permanent injuries may result in nerve injuries that are refractory to initial treatment, which could result in further medical expenses as more expensive methods of treatment or prolonged treatment may be needed to treat the underlying injury. In my collective clinical experience, my care plan as documented from the initial note leads to significant recovery from injuries similar to the patient's in the majority of my other patients. Delays in recovery will prevent the patient from returning to work full time in a timely fashion. Delays in recovery caused by workers compensation for patients with similar injury have also lead to significant financial settlements for the patients should a legal dispute arise. I have counseled the patient on all of the above.  Please be advised that I do not determine MMI and will update in the assessment whether patient is improving or not, or has plateaued to the point of permanent symptoms despite successfully following recommended treatment by myself  Please be advised that the above work status is re-evaluated each visit and that the nature of the patient's injury described in the assessment and diagnoses do not have a standard or expected timeline for recovery as demonstrated in multiple up to date peer-reviewed publications  Please be advised that I do not perform FCEs. I will also not answer or comment on any FCE  questions.  Please be advised I will not fill out additional paperwork that asks me to restate diagnoses, plan of care, work status, and/or accommodations as these are all documented in my clinic note.  Please be advised I will not fill out paperwork asking me to agree with a workers compensation representative's interpretation of my plan of care and/or evaluation of the patient as I stand by what is documented in my clinic note.  Please be advised that any peer to peer requests made on the behalf of workers compensation will need to be scheduled based on my availability and will be completed by myself within 1 week of the request. My clinic is not capable of doing on demand peer to peer requests with less than 48 hours of notice.     80 minutes were spent on the date of this patient encounter, which includes: preparing to see the patient, reviewing previous history, obtaining new patient history, performing the physical exam, counseling and educating the patient and/or family/caregiver, ordering necessary medications or tests or referrals, documenting in the electronic medical record, coordinating care.    Chandrakant Laughlin MD  [facility]

## 2023-09-18 ENCOUNTER — TELEPHONE (OUTPATIENT)
Dept: NEUROLOGY | Facility: CLINIC | Age: 68
End: 2023-09-18
Payer: OTHER MISCELLANEOUS

## 2023-09-18 NOTE — TELEPHONE ENCOUNTER
Spoke to Pt's daughter. I let her know that Mr. Gillis should stop taking the steroids. She said she will take the steroids off Pt's medication list.     ----- Message from Chandrakant Laughlin MD sent at 9/18/2023  4:54 PM CDT -----  Regarding: RE: Elevated Glucose  Contact: Bobbi [phone]  Please advise to stop the steroids and will continue rest of plan for now and see how he feels and what exam looks like on follow up. Thanks!  ----- Message -----  From: Marilu Hoyt MA  Sent: 9/18/2023   4:24 PM CDT  To: Chandrakant Laughlin MD  Subject: FW: Elevated Glucose                               ----- Message -----  From: Hernan Carlson  Sent: 9/18/2023   3:24 PM CDT  To: Qian Stallings Staff  Subject: Elevated Glucose                                 Pt daughter Bobbi states when Steroid was taking on  Friday morning the Glucose level was 160 and the afternoon on Friday was 240 Glucose.  Bobbi states it seems as when her dad takes the steroids it elevates his Glucose levels and she was instructed to call.  Bobbi has a list of elevations that she has been keeping track of.  Please call to discuss further.

## 2023-09-27 ENCOUNTER — OFFICE VISIT (OUTPATIENT)
Dept: NEUROLOGY | Facility: CLINIC | Age: 68
End: 2023-09-27
Payer: OTHER MISCELLANEOUS

## 2023-09-27 VITALS
BODY MASS INDEX: 28 KG/M2 | DIASTOLIC BLOOD PRESSURE: 75 MMHG | WEIGHT: 200 LBS | HEIGHT: 71 IN | SYSTOLIC BLOOD PRESSURE: 117 MMHG | HEART RATE: 73 BPM

## 2023-09-27 DIAGNOSIS — M54.81 OCCIPITAL NEURITIS: ICD-10-CM

## 2023-09-27 DIAGNOSIS — R26.89 IMBALANCE: ICD-10-CM

## 2023-09-27 DIAGNOSIS — S06.6X9S SUBARACHNOID HEMORRHAGE FOLLOWING INJURY, WITH LOSS OF CONSCIOUSNESS, SEQUELA: Primary | ICD-10-CM

## 2023-09-27 DIAGNOSIS — M54.2 CERVICALGIA OF OCCIPITO-ATLANTO-AXIAL REGION: ICD-10-CM

## 2023-09-27 DIAGNOSIS — S13.4XXD WHIPLASH INJURY TO NECK, SUBSEQUENT ENCOUNTER: ICD-10-CM

## 2023-09-27 DIAGNOSIS — G44.86 CERVICOGENIC HEADACHE: ICD-10-CM

## 2023-09-27 DIAGNOSIS — S06.0X9S CONCUSSION WITH LOSS OF CONSCIOUSNESS, SEQUELA: ICD-10-CM

## 2023-09-27 PROCEDURE — 99999 PR PBB SHADOW E&M-EST. PATIENT-LVL IV: ICD-10-PCS | Mod: PBBFAC,,, | Performed by: PSYCHIATRY & NEUROLOGY

## 2023-09-27 PROCEDURE — 99213 OFFICE O/P EST LOW 20 MIN: CPT | Mod: S$GLB,,, | Performed by: PSYCHIATRY & NEUROLOGY

## 2023-09-27 PROCEDURE — 99999 PR PBB SHADOW E&M-EST. PATIENT-LVL IV: CPT | Mod: PBBFAC,,, | Performed by: PSYCHIATRY & NEUROLOGY

## 2023-09-27 PROCEDURE — 99213 PR OFFICE/OUTPT VISIT, EST, LEVL III, 20-29 MIN: ICD-10-PCS | Mod: S$GLB,,, | Performed by: PSYCHIATRY & NEUROLOGY

## 2023-09-27 NOTE — PATIENT INSTRUCTIONS
Referral for lower neck/upper back to mid back PT with myofascial release (deep tissue massage, cupping, dry needling, etc). No manipulation of suboccipital region.  We discussed if PT does not help headaches, will start medication at that time  The patient was instructed to ice the occipital region for no more than 20 minutes at least once a day but may repeat this as many times as they would like.   Discussed ergonomic accommodations for occipital neuritis/neuralgia. Mainly perform all work at eye level to minimize continued neck flexion which will aggravate the nerve.  Patient was encouraged to do daily light cardio exercise but instructed to limit physical activities to walking, walking in water up to the waist only or riding a stationary bike, recumbent preferred. No weight lifting in upper body, no neck massage, no acupuncture of neck, and no dry needling of upper neck. No neck PT unless otherwise stated. No chiropractor work on neck. Lower body strengthening with resistant bands, leg machines, and strapping weights to legs okay. No core body workouts. No running or use of cardio equipment other than stationary bike. No swimming or body surfing. No amusement park rides. No lifting more than 5-10 lbs and bend at the knees, not the waist.  Discussed care plan in detail for post traumatic occipital neuritis including a trial of oral medications followed by series of trigger point steroid injections with occipital nerve blocks. To be referred for consultation for occipital nerve release procedure if initially clinically responsive to injections but always with a return of symptoms.  Referral for vestibular PT for balance and eye movements placed previously  You do not feel your feet well due to neuropathy. Please wear footwear with good tread at all times. This recommendation includes wearing socks with tread on them. Make sure all of your walkways, hallways, and julio are well lit at all times day and night.  Use night lights to light up commonly used pathways in your home, meaning from the bedroom to the bathroom or kitchen. Make sure all of your walkways, hallways, and julio are clear of obstacles at all times. Obstacles include decorations, rugs/mats, pet beds, shoes, and clothes. Make sure you have good hand holds to grab onto as you walk around your home.  It is with a high degree of medical certainty that this patient's current signs and symptoms were caused or exacerbated by the work related injury mentioned in the note above  The patient can do light duty desk work only with the above restrictions at this time  Any delay in treatment that I am recommending for the patient's work related injury as the result of things like IMEs, FCEs, and denials in the care plan may delay the patient's recovery. Delays in recovery may cause or further worsen any underlying permanent injury that was caused by the result of the patient's injury. Delays in recovery may also cause the development of new medical conditions that will then need to be treated. The development of some forms of permanent injuries may result in nerve injuries that are refractory to initial treatment, which could result in further medical expenses as more expensive methods of treatment or prolonged treatment may be needed to treat the underlying injury. In my collective clinical experience, my care plan as documented from the initial note leads to significant recovery from injuries similar to the patient's in the majority of my other patients. Delays in recovery will prevent the patient from returning to work full time in a timely fashion. Delays in recovery caused by workers compensation for patients with similar injury have also lead to significant financial settlements for the patients should a legal dispute arise. I have counseled the patient on all of the above.  Please be advised that I do not determine MMI and will update in the assessment whether  patient is improving or not, or has plateaued to the point of permanent symptoms despite successfully following recommended treatment by myself  Please be advised that the above work status is re-evaluated each visit and that the nature of the patient's injury described in the assessment and diagnoses do not have a standard or expected timeline for recovery as demonstrated in multiple up to date peer-reviewed publications  Please be advised that I do not perform FCEs. I will also not answer or comment on any FCE questions.  Please be advised I will not fill out additional paperwork that asks me to restate diagnoses, plan of care, work status, and/or accommodations as these are all documented in my clinic note.  Please be advised I will not fill out paperwork asking me to agree with a workers compensation representative's interpretation of my plan of care and/or evaluation of the patient as I stand by what is documented in my clinic note.  Please be advised that any peer to peer requests made on the behalf of workers compensation will need to be scheduled based on my availability and will be completed by myself within 1 week of the request. My clinic is not capable of doing on demand peer to peer requests with less than 48 hours of notice.

## 2023-09-27 NOTE — PROGRESS NOTES
Chief Complaint: Headache    Subjective:     History of Present Illness    Referring Provider: ED  Date of Injury: 6/13/23  Accompanied by: No one  Workers Comp     09/11/2023: Carlin Madrigal is a 68 y.o. male with h/o DM, HTN, HLD who presents for concussion evaluation. 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 Currently, headaches are right occipital, can last all day, 2-3 times a week, pounding. Per daughter, he has described head as bloated. He has a scar on right side of neck from the above injury. He has lower neck pain that is new since above injury. He has associated phonophobia per daughter, weakness in legs, imbalance that is new since above injury, spinning that was present prior to injury that is worse since above injury. He denies photophobia, numbness, tingling, lightheadedness, N/V. He has been having dry mouth. He notes fluctuating vision from diabetes and denies vision changes since above injury. He denies changes in taste, smell, hearing, other vision changes, appetite. He denies tinnitus. Per daughter, he gets bilateral jaw pain for the last 3 weeks that is sharp that is new since above injury and has looked swollen when eating before. He denies cognitive fogginess, concentration difficulties, and memory changes. He is sleeping well and wakes up rested. He has daytime fatigue. He snores and denies it worsening since above injury and denies apnea. He denies positional component but headache worsens when lays on right side and vasal vagal maneuvers do not significantly worsen headaches. He denies headaches waking him up and does  not wake up with headaches. Mood is okay and per daughter he is easily irritable since the above injury that he agrees with. He denies emotional lability. He has only tried Tylenol. He was just prescribed PT to help with headaches. He denies other prior head and neck injuries. Prior to current injury, headaches would be once every 2 weeks from being out in the sun and no associated photophobia, phonophobia, weakness, numbness, tingling, N/V, change in vision, aura and would not stop ADLs. Glucose most recently 124 and has been 160s and 180s.     Per ED note dated 6/13/23, per daughter he passed out at work today per coworkers and upon awakening could not remember where he was or what happened, he reports becoming dizzy but does not remember passing out     Per ED note dated 5/25/23, he has had 1 syncopal episode, has had intermittent blurred vision and dizziness described as lightheadedness that started 1 week ago, 1 week of right shoulder pain, and neck pain that began 2 weeks ago and denies trauma or injuries to the area and was sudden onset.    09/27/2023: Carlin Madrigal is a 68 y.o. male  with h/o DM, HTN, HLD, SAH and concussion with LOC, kinetic force injury with resultant cranio cervical trauma and occipital neuritis s/p medrol dose pack x1 who presents for follow up. On last visit, patient was prescribed a Medrol dose pack and started on ice therapy, ergonomics, and exercise restrictions and referred for vestibular PT and not to start neck PT and counseled on neuropathy precautions. In the interim, stopped Medrol as glucose went > 200. Offered patient translational services if needed and he declines at this time and let him know it is a free service should he choose to need it during the visit. He feels better compared to when injury first happened. Headaches are 1-3 times a week per a calendar he shows me, right occipital, cannot describe pain quality, lasts whole day but not into next day. He denies neck  pain. He describes generalized weakness. He denies photophobia, phonophobia, numbness, tingling, dizziness, N/V. He thinks he has difficulties seeing from diabetes and denies changes from his injury. He is sleeping well and wakes up feeling well. Mood is okay. He stopped taking Medrol on day 3 of the pack as his glucose was going > 200 and denies side effects. He is icing. He has not heard about vestibular PT. He is not doing neck PT. He is following neuropathy precautions.     Current Outpatient Medications on File Prior to Visit   Medication Sig Dispense Refill    amLODIPine (NORVASC) 5 MG tablet Take 1 tablet (5 mg total) by mouth once daily. 30 tablet 1    diclofenac sodium (VOLTAREN) 1 % Gel Apply 2 g topically 3 (three) times daily. 100 g 0    gabapentin (NEURONTIN) 300 MG capsule Take 300 mg by mouth 3 (three) times daily.      lisinopriL (PRINIVIL,ZESTRIL) 40 MG tablet Take 40 mg by mouth Daily.      metFORMIN (GLUCOPHAGE) 1000 MG tablet Take 1,000 mg by mouth 2 (two) times a day.      NOVOLIN 70/30 U-100 INSULIN 100 unit/mL (70-30) injection Inject 15 Units into the skin 2 (two) times a day.      pravastatin (PRAVACHOL) 20 MG tablet Take 20 mg by mouth Daily.      [DISCONTINUED] methylPREDNISolone (MEDROL DOSEPACK) 4 mg tablet use as directed 1 each 0     No current facility-administered medications on file prior to visit.       Review of patient's allergies indicates:  No Known Allergies    History reviewed. No pertinent family history.    Social History     Tobacco Use    Smoking status: Former     Types: Cigarettes     Passive exposure: Never    Smokeless tobacco: Never   Substance Use Topics    Alcohol use: Not Currently     Comment: 06/13/23: occ    Drug use: Not Currently       Review of Systems  Constitutional: No fevers, no chills, no change in weight  Eye/Vision: See HPI  Ear/Nose/Mouth/Throat: See HPI; no cough, no runny nose, no sore throat  Respiratory: No shortness of breath, no problems  "breathing  Cardiovascular: No chest pain  Gastrointestinal: See HPI, no diarrhea, no constipation  Genitourinary: No dysuria  Musculoskeletal: See HPI  Integumentary: No skin changes  Neurologic: See HPI  Psychiatric: Denies depression, denies anxiety, denies SI and HI.  Additional System Information:     Objective:     There were no vitals filed for this visit.    General: Alert and awake, Well nourished, Well groomed, No acute distress, no photophobia with 60 Hz hypersensitivity.  Eyes: Extraocular movements are intact; Normal conjunctiva; no nystagmus; Visual fields are intact bilaterally to finger counting in all cardinal directions  Neck: Supple  No Stiffness  Patient has occipital point tenderness over the right lesser occipital nerve without induction of headaches with no jump sign and no twitch response and no referred pain: trace   No high, medial cervical pain with lateral movement of C1 over C2 and with isometric neck flexion and extension  Fluid patient turnaround with concurrent neck movement in direction of torso movement.  Bilateral paraspinal cervical muscle spasm present  Spine/torso exam: Spine/ torso exam is within normal limits    Neurologic Exam  no saccadic intrusions of volitional ocular smooth pursuits  no pain with sustained upgaze and convergence  no visual motion sensitivity/dizziness produced with rapid eye movements or neck movements  no convergence insufficiency with no diplopia developed > 5 " accommodation    Sensory: Negative Romberg, unsteady on tandem stance    Gait: Gait WNL, Heel to toe walking WNL    Labs:    No new labs    Imaging:    No new studies    Assessment:       ICD-10-CM ICD-9-CM    1. Subarachnoid hemorrhage following injury, with loss of consciousness, sequela  S06.6X9S 907.0       2. Concussion with loss of consciousness, sequela  S06.0X9S 907.0       3. Whiplash injury to neck, subsequent encounter  S13.4XXD V58.89      847.0       4. Cervicogenic headache  G44.86 " 784.0       5. Occipital neuritis  M54.81 723.8       6. Imbalance  R26.89 781.2       7. Cervicalgia of kderqwoy-dfnawwq-paekp region  M54.2 723.1          68 y.o. male with h/o DM, HTN, HLD, SAH and concussion with LOC, kinetic force injury with resultant cranio cervical trauma and occipital neuritis s/p medrol dose pack x1 who presents for follow up. Imaging from 6/13/23 injury revealed bilateral frontal and left temporal and right parietoccipital and right cerebellar hemorrhagic contusions with small amount of thin subdural blood along the falx and tentorium with left frontal lobe SAH and small SAH in right posterior frontoparietal region. On exam, he  has occipital point tenderness over the right lesser occipital nerve without induction of headaches with no jump sign and no twitch response and no referred pain, bilateral cervical paraspinal muscle spasm, imbalance and on initial exam vibratory loss in fingers and distal lower extremities, positive right head thrust. We discussed previously that he had a TBI given imaging evidence. We discussed previously based on imaging evidence, he may have problems with memory and executive functioning but he is not complaining of those functions at this time. We discussed previously exam also indicative of long fiber neuropathy that is likely 2/2 to DM and not related to his injury. Overall, his exam has improved and is mainly muscular at this time.    Plan:     Referral for lower neck/upper back to mid back PT with myofascial release (deep tissue massage, cupping, dry needling, etc). No manipulation of suboccipital region.  We discussed if PT does not help headaches, will start medication at that time  The patient was instructed to ice the occipital region for no more than 20 minutes at least once a day but may repeat this as many times as they would like.   Discussed ergonomic accommodations for occipital neuritis/neuralgia. Mainly perform all work at eye level to  minimize continued neck flexion which will aggravate the nerve.  Patient was encouraged to do daily light cardio exercise but instructed to limit physical activities to walking, walking in water up to the waist only or riding a stationary bike, recumbent preferred. No weight lifting in upper body, no neck massage, no acupuncture of neck, and no dry needling of upper neck. No neck PT unless otherwise stated. No chiropractor work on neck. Lower body strengthening with resistant bands, leg machines, and strapping weights to legs okay. No core body workouts. No running or use of cardio equipment other than stationary bike. No swimming or body surfing. No amusement park rides. No lifting more than 5-10 lbs and bend at the knees, not the waist.  Discussed care plan in detail for post traumatic occipital neuritis including a trial of oral medications followed by series of trigger point steroid injections with occipital nerve blocks. To be referred for consultation for occipital nerve release procedure if initially clinically responsive to injections but always with a return of symptoms.  Referral for vestibular PT for balance and eye movements placed previously  You do not feel your feet well due to neuropathy. Please wear footwear with good tread at all times. This recommendation includes wearing socks with tread on them. Make sure all of your walkways, hallways, and julio are well lit at all times day and night. Use night lights to light up commonly used pathways in your home, meaning from the bedroom to the bathroom or kitchen. Make sure all of your walkways, hallways, and julio are clear of obstacles at all times. Obstacles include decorations, rugs/mats, pet beds, shoes, and clothes. Make sure you have good hand holds to grab onto as you walk around your home.  It is with a high degree of medical certainty that this patient's current signs and symptoms were caused or exacerbated by the work related injury  mentioned in the note above  The patient can do light duty desk work only with the above restrictions at this time  Any delay in treatment that I am recommending for the patient's work related injury as the result of things like IMEs, FCEs, and denials in the care plan may delay the patient's recovery. Delays in recovery may cause or further worsen any underlying permanent injury that was caused by the result of the patient's injury. Delays in recovery may also cause the development of new medical conditions that will then need to be treated. The development of some forms of permanent injuries may result in nerve injuries that are refractory to initial treatment, which could result in further medical expenses as more expensive methods of treatment or prolonged treatment may be needed to treat the underlying injury. In my collective clinical experience, my care plan as documented from the initial note leads to significant recovery from injuries similar to the patient's in the majority of my other patients. Delays in recovery will prevent the patient from returning to work full time in a timely fashion. Delays in recovery caused by workers compensation for patients with similar injury have also lead to significant financial settlements for the patients should a legal dispute arise. I have counseled the patient on all of the above.  Please be advised that I do not determine MMI and will update in the assessment whether patient is improving or not, or has plateaued to the point of permanent symptoms despite successfully following recommended treatment by myself  Please be advised that the above work status is re-evaluated each visit and that the nature of the patient's injury described in the assessment and diagnoses do not have a standard or expected timeline for recovery as demonstrated in multiple up to date peer-reviewed publications  Please be advised that I do not perform FCEs. I will also not answer or comment on  any FCE questions.  Please be advised I will not fill out additional paperwork that asks me to restate diagnoses, plan of care, work status, and/or accommodations as these are all documented in my clinic note.  Please be advised I will not fill out paperwork asking me to agree with a workers compensation representative's interpretation of my plan of care and/or evaluation of the patient as I stand by what is documented in my clinic note.  Please be advised that any peer to peer requests made on the behalf of workers compensation will need to be scheduled based on my availability and will be completed by myself within 1 week of the request. My clinic is not capable of doing on demand peer to peer requests with less than 48 hours of notice.     23 minutes were spent on the date of this patient encounter, which includes: preparing to see the patient, reviewing previous history, obtaining new patient history, performing the physical exam, counseling and educating the patient and/or family/caregiver, ordering necessary medications or tests or referrals, documenting in the electronic medical record, coordinating care.    Chandrakant Laughlin MD  Sports Neu[facility]

## 2023-10-03 ENCOUNTER — PATIENT MESSAGE (OUTPATIENT)
Dept: NEUROLOGY | Facility: CLINIC | Age: 68
End: 2023-10-03
Payer: OTHER MISCELLANEOUS

## 2023-10-11 ENCOUNTER — CLINICAL SUPPORT (OUTPATIENT)
Dept: REHABILITATION | Facility: HOSPITAL | Age: 68
End: 2023-10-11
Payer: OTHER MISCELLANEOUS

## 2023-10-11 DIAGNOSIS — F07.81 POST CONCUSSION SYNDROME: ICD-10-CM

## 2023-10-11 DIAGNOSIS — R29.898 DECREASED ROM OF NECK: ICD-10-CM

## 2023-10-11 DIAGNOSIS — G44.319 ACUTE POST-TRAUMATIC HEADACHE, NOT INTRACTABLE: Primary | ICD-10-CM

## 2023-10-11 DIAGNOSIS — H81.90 VESTIBULOPATHY, UNSPECIFIED LATERALITY: ICD-10-CM

## 2023-10-11 PROCEDURE — 97162 PT EVAL MOD COMPLEX 30 MIN: CPT | Mod: PN

## 2023-10-11 PROCEDURE — 97112 NEUROMUSCULAR REEDUCATION: CPT | Mod: PN

## 2023-10-11 NOTE — PLAN OF CARE
[facility]   Physical Therapy Workers' Compensation Initial Evaluation      Name: Carlin Madrigal  Clinic Number: [id number]    Therapy Diagnosis:   Encounter Diagnoses   Name Primary?    Decreased ROM of neck     Vestibulopathy, unspecified laterality      Physician: Arti Roberts MD    Physician Orders: PT Eval and Treat   Medical Diagnosis from Referral: Acute post-traumatic headache, not intractable [G44.319], Post concussion syndrome [F07.81]  Evaluation Date: 10/11/2023  Authorization Period Expiration: 3/19/2024  Plan of Care Expiration: 11/24/2023  Progress Note Due: 11/11/2023  Visit # / Visits authorized: 1/12    Foto: # 1/3  Time In: 1:57PM  Time Out: 2:55PM  Total Appointment Time (timed & untimed codes): 58 minutes (1 mod eval; 1 NMR)    Precautions: Standard    Subjective     Date of injury: 6/13/2023  History of current condition: He was working as  in June of this year when he passed out. In his subsequent fall, he broke some ribs on right side and hit the right side of the back of his head. Per chart review, imaging confirmed left frontal contusions and subarachnoid hemorrhage. Initially suffered from dizziness, imbalance, headache, and neck pain. Dizziness and headache have resolved and while other symptoms are getting better, they have not fully absent. No additional falls since his syncopal episode.     Prior medical treatment: Has followed up with neurology and neurosurgery    Occupation/job title:    Job demands: Lays base of concrete walkways; bending/stooping; cutting and nailing boards; all outdoor work    Current work restrictions: Not currently working  Previous work status: Full time  Current work status: Not currently working  Date last worked (if applicable): 6/13/2023    Imaging:  - MRI Brain (7/11/2023): Continued temporal evolution/maturation of patient's known hemorrhagic contusions involving the inferior frontal  lobes and left anterior temporal lobe.  No new intracranial hemorrhage, worsening mass effect or acute major vascular territory infarct.    Social History: lives with family    Pain:  Current 0/10, worst 5/10, best 0/10   Location: right suboccipital   Description: Aching  Aggravating Factors: None noted   Easing Factors: Tylenol    Patient's goals: Patient says he will likely not be returning to role as  due to age and rigor of outdoor work but would still like to pursue therapy to manage cervical dysfunction and imbalance    Medical History:   Past Medical History:   Diagnosis Date    Diabetes mellitus     Hypertension        Surgical History:   Carlin Madrigal  has no past surgical history on file.    Medications:   Carlin has a current medication list which includes the following prescription(s): amlodipine, diclofenac sodium, gabapentin, lisinopril, metformin, novolin 70/30 u-100 insulin, and pravastatin.    Allergies:   Review of patient's allergies indicates:  No Known Allergies     Objective      Mental status: alert, oriented to person, place, and time, normal mood, behavior, speech, dress, motor activity, and thought processes  Appearance: Casually dressed  Behavior: calm, cooperative, and adequate rapport can be established  Attention Span and Concentration:  Normal    OCULOMOTOR ASSESSMENT:   Visual/ Ocular Motor Test:  Headache (0-10) Dizziness (0-10) Nausea (0-10) Fogginess (0-10) Comments    Smooth pursuits (1.5 feet left/right of center; 2 times; 30 bpm each direction; horizontal and vertical) 0 0 0 0 Normal   Saccades- horizontal (1.5 feet left/right of center; 3 feet away; 10 times; no specific speed) 0 0 0 0 Normal quality but slightly decreased speed (likely age-related)   Saccades- vertical (1.5 feet up/down of center; 3 feet away; 10 times; no specific speed) 0 0 0 0 Normal quality but slightly decreased speed (likely age-related)   Convergence (14 pt font; normal 5 cm) 0 0 0  0 Near point (cm):   <10cm  2. <10cm  3. <10cm   VOR- horizontal (14 pt font; 20 degrees rotation left/right; 10 reps; 180 bpm) 0 3 0 0 No slippage   VOR- vertical (14 pt font; 20 degrees rotation up/down; 10 times; 180 bpm) 0 3 0 0 No slippage   Visual motion sensitivity test (80 degrees left/right; 5 times each direction; 50 bpm) 0 0 0 0 Normal     Head Impulse Test: Impaired: corrective saccade with left head impulse    ORTHOPEDIC ASSESSMENT:  Muscle Tone: increased tissue density at bilateral suboccipital region (right > left); bilateral scalenes  TTP: suboccipitals, right    RANGE OF MOTION:  CERVICAL SPINE AROM:   Flexion: (norm: 80-90 deg) 70 deg   Extension: (norm: 70-80 deg) 49 deg   Left Sidebend: (norm: 20-45 deg) 27 deg   Right Sidebend: (norm: 20-45 deg) 25 deg   Left Rotation: (norm: 70-90 deg) 65 deg   Right Rotation: (norm: 70-90 deg) 59 deg     Craniocervical flexion test: + right for decreased range of motion only; no headache recreation   Posture: Mild forward head; flattened cervical spine    UPPER EXTREMITY  (R) UE: WFLs  (L) UE: WFLs         STRENGTH: manual muscle test grades below     Deep Neck Flexors Endurance Test: Able to hold >25 seconds but using superficial cervical flexors    Upper Extremity Strength (in modified seated position - not completed in prone)   SUNDAY HOPPER   Gross shoulder:  4+/5 4+/5   Low traps:  4/5 4/5   Mid traps:  4/5 4/5   Rhomboids:  4/5 4/5     POSTURAL CONTROL:    m-CTSIB (each position held 30 seconds for pass)  - Condition 1 (eyes open, solid surface): P min sway  - Condition 2 (eyes closed, solid surface): P mod sway  - Condition 3 (eyes open, foam surface): P mod sway  - Condition 4 (eyes closed, foam surface): P high sway (consistently pulls to right)    Intake Outcome Measure for FOTO Complications + Unspecified Injuries Survey    Therapist reviewed FOTO scores for Carlin Madrigal on 10/11/2023.   FOTO documents entered into [employer] - see Media section.    Intake  Score: 60% (predicted = 73%)       Treatment   Total Treatment time (time-based codes) separate from Evaluation: 10 minutes    neuromuscular re-education activities to improve: Balance, Coordination, Kinesthetic, Sense, Proprioception, and Posture for 10 minutes:  - Patient education on basic tenants of vestibular physical therapy including expected and appropriate symptomatic response to VOR activity, techniques for self-progression, and recommended HEP volume  - Patient education on the following exercises with return demonstration: VORx1 (horizontal and vertical)  - Chin tucks supine, scapular retractions seated, levator trap stretch seated, upper trap stretch seated with return demonstration of all postural exercises for correct form check; options for progressions provided    Home Exercises and Patient Education Provided  Education provided:   - yes    Home Exercises Provided: yes.  Exercises were reviewed and Carlin was able to demonstrate them prior to the end of the session.  Carlin demonstrated good  understanding of the education provided.     Patient Education and Home Exercises     Education provided:   - PT plan of care  - HEP instruction    Written Home Exercises Provided: yes. Exercises were reviewed and Carlin was able to demonstrate them prior to the end of the session.  Carlin demonstrated good  understanding of the education provided. See EMR under Patient Instructions for exercises provided during therapy sessions.    Assessment     Carlin is a 68 y.o. male referred to outpatient Physical Therapy with a medical diagnosis of Acute post-traumatic headache, not intractable [G44.319], Post concussion syndrome [F07.81]. Patient presents with decreased cervical range of motion; suboccipital discomfort; impaired postural strength; impaired neuromuscular control of upper quarter musculature; impaired VOR; mild sensory integration deficits; and impaired self perception of functional mobility per  FOTO. He would benefit from skilled physical therapy services to address listed impairments, improve functional mobility, improve quality of life, and return to prior level of function.    The patient's current job specific task deficits include the following: any activity involving bending, stooping, head turning, or working in dim/dark environments    Patient prognosis: Good.   Rehab potential: Good    Patient will benefit from skilled outpatient Physical Therapy to address the deficits stated above and in the chart below, provide patient /family education, to maximize patient's level of independence, and to address work-related functional deficits for their job as a .    Plan of care discussed with patient: Yes  Patient's spiritual, cultural and educational needs considered and patient is agreeable to the plan of care and goals as stated below:     Anticipated Barriers for therapy: chronicity of current injury    Medical Necessity is demonstrated by the following  History  Co-morbidities and personal factors that may impact the plan of care [] LOW: no personal factors / co-morbidities  [x] MODERATE: 1-2 personal factors / co-morbidities  [] HIGH: 3+ personal factors / co-morbidities    Moderate / High Support Documentation:   Co-morbidities affecting plan of care:   Diabetes mellitus   Hypertension     Personal Factors:   no deficits     Examination  Body Structures and Functions, activity limitations and participation restrictions that may impact the plan of care [] LOW: addressing 1-2 elements  [] MODERATE: 3+ elements  [x] HIGH: 4+ elements (please support below)    Moderate / High Support Documentation: See above     Clinical Presentation [] LOW: stable  [x] MODERATE: Evolving  [] HIGH: Unstable     Decision Making/ Complexity Score: moderate       Goals:   Short Term Goals: 3 weeks   Patient will be 100% compliant with HEP in order to maximize PT benefits  Patient will improve bilateral  lower extremity MMT grades by >/=1/2 grade in order to improve strength for ADL completion  Patient will improve cervical AROM in plane of rotation by >/= 5 degrees in order to improve functional mobility for activities such as driving  Patient will complete >/=30 seconds of VOR activity at >/=120 bpm with no increase in baseline dizziness in order to improve vestibular function for fixation tasks   Patient will note neck/suboccipital pain at worst as </=3/10 over 3 week period in order to improve general activity tolerance and quality of life    Long Term Goals: 6 weeks   Patient will score >/= 73% on FOTO survey in order to improve self-perception of functional mobility deficits  Patient will improve bilateral lower extremity MMT grades by >/=1 grade in order to improve strength for ADL completion  Patient will improve cervical AROM in plane of rotation by >/= 10 degrees in order to improve functional mobility for activities such as driving  Patient will complete >/=30 seconds of VOR activity at >/=150 bpm with no increase in baseline dizziness in order to improve vestibular function for fixation tasks   Patient will complete all components of m-CTSIB with </=moderate postural sway in order to improve balance in dim/dark environments  Patient will note neck/suboccipital pain at worst as </=1/10 over 3 week period in order to improve general activity tolerance and quality of life    Plan     Plan of care Certification: 10/11/2023 to 11/24/2023.    Outpatient Physical Therapy 2 times weekly for 6 weeks to include the following interventions: Gait Training, Manual Therapy, Moist Heat/ Ice, Neuromuscular Re-ed, Patient Education, Self Care, Therapeutic Activities, Therapeutic Exercise, and Modalities PRN; Functional Dry Needling PRN .     Upon discharge from further skilled PT intervention it will determined if the need for a work conditioning program or Functional Capacity Evaluation is required to allow the injured  worker to return to work with full potential achieved, continued improvement with body mechanics with advanced functional activities, and further minimize future work-related injuries.     Physical therapist and physical therapy assistant(s) will met face to face to discuss patient's treatment plan and progress towards established goals. Pt will be seen by a physical therapist minimally every 6th visit or every 30 days.    JONNY DEMPSEY, PT  10/11/2023       I CERTIFY THE NEED FOR THESE SERVICES FURNISHED UNDER THIS PLAN OF TREATMENT AND WHILE UNDER MY CARE     Physician's comments:           Physician's Signature: ___________________________________________________

## 2023-10-18 ENCOUNTER — CLINICAL SUPPORT (OUTPATIENT)
Dept: REHABILITATION | Facility: HOSPITAL | Age: 68
End: 2023-10-18
Payer: OTHER MISCELLANEOUS

## 2023-10-18 DIAGNOSIS — R29.898 DECREASED ROM OF NECK: Primary | ICD-10-CM

## 2023-10-18 DIAGNOSIS — H81.90 VESTIBULOPATHY, UNSPECIFIED LATERALITY: ICD-10-CM

## 2023-10-18 PROCEDURE — 97110 THERAPEUTIC EXERCISES: CPT | Mod: PN,CQ

## 2023-10-18 PROCEDURE — 97112 NEUROMUSCULAR REEDUCATION: CPT | Mod: PN,CQ

## 2023-10-18 NOTE — PROGRESS NOTES
" [facility]   Physical Therapy Treatment Note      Name: Carlin Madrigal  Clinic Number: [id number]    Therapy Diagnosis:   Encounter Diagnoses   Name Primary?    Decreased ROM of neck Yes    Vestibulopathy, unspecified laterality      Physician: Arti Roberts MD    Visit Date: 10/18/2023    Physician Orders: PT Eval and Treat   Medical Diagnosis from Referral: Acute post-traumatic headache, not intractable [G44.319], Post concussion syndrome [F07.81]  Evaluation Date: 10/11/2023  Authorization Period Expiration: 3/19/2024  Plan of Care Expiration: 11/24/2023  Progress Note Due: 11/11/2023  Visit # / Visits authorized: 2/12  Foto: # 1/3    Time In: 11:24 AM  Time Out: 12:02 PM  Total Appointment Time (timed & untimed codes): 38 minutes (2 TE + 1 NMR)     Precautions: Standard    PTA Visit #: 1/5       Subjective     Patient reports: still having some R sided posterior neck and head pain.  Says his arms and shoulders feel very weak following concussion.  He was compliant with home exercise program.  Response to previous treatment: initial eval  Functional change: ongoing    Pain: 5/10  Location: right neck and posterior head      Objective      Objective Measures updated at progress report unless specified.     Treatment     Carlin received the treatments listed below:      therapeutic exercises to develop strength, endurance, ROM, and flexibility for 28 minutes including:  Seated:  UT stretch 2x30" B  Levator scap stretch: 2x30" B  Cervical AROM wit 5" holds x5 in each direction  Scap retracts 3" holds 2x10  Bilateral ER with red theraband 2x10    Supine:  Chin tucks 5" holds 2 trials of 1' ea  Horizontal abduction with red theraband: 2x10  Serratus punches with 2# dowel 2x10 with 2-3" holds    manual therapy techniques: Soft tissue Mobilization were applied to the: suboccipital, UT, cervical paraspinals for 00 minutes, including:  Not performed today - late arrival    neuromuscular " "re-education activities to improve: Balance, Coordination, Kinesthetic, and Posture for 10 minutes. The following activities were included:  VOR x1 3x30" (horizontal/vertical)  Narrow base of support on airex (eyes open/eyes closed) 2x30" held today because pt wearing flip flops    therapeutic activities to improve functional performance for 00  minutes, including:  Not performed today    Patient Education and Home Exercises       Education provided:   - daily HEP compliance    Written Home Exercises Provided: Patient instructed to cont prior HEP. Exercises were reviewed and Carlin was able to demonstrate them prior to the end of the session.  Carlin demonstrated good  understanding of the education provided. See Electronic Medical Record under Patient Instructions for exercises provided during therapy sessions    Assessment     Carlin arrived to session with moderate complaints of R sided neck but was agreeable to treatment.  Reports daily compliance of HEP provided at initial evaluation last week and did not have any questions.  Session consisted of periscapular and postural strengthening, mostly mat based, completed correctly and with minimal cuing.  Some mild cervical proprioception deficits noted during VOR activity with minimal improvement following cuing.  No exacerbation of neck pain reported with any exercises.  Certain balance exercises were held this session because patient arrived in flip flops and was instructed to wear closed toe shoes next session.  He would benefit from continued PT services to achieve goals set at evaluation.    Carlin Is progressing well towards his goals.   Patient prognosis is Good.     Patient will continue to benefit from skilled outpatient physical therapy to address the deficits listed in the problem list box on initial evaluation, provide pt/family education and to maximize pt's level of independence in the home and community environment.     Patient's spiritual, " cultural and educational needs considered and pt agreeable to plan of care and goals.     Anticipated barriers to physical therapy: chronicity of current injury    Goals:   Short Term Goals: 3 weeks  Patient will be 100% compliant with HEP in order to maximize PT benefits  Patient will improve bilateral lower extremity MMT grades by >/=1/2 grade in order to improve strength for ADL completion  Patient will improve cervical AROM in plane of rotation by >/= 5 degrees in order to improve functional mobility for activities such as driving  Patient will complete >/=30 seconds of VOR activity at >/=120 bpm with no increase in baseline dizziness in order to improve vestibular function for fixation tasks   Patient will note neck/suboccipital pain at worst as </=3/10 over 3 week period in order to improve general activity tolerance and quality of life     Long Term Goals: 6 weeks   Patient will score >/= 73% on FOTO survey in order to improve self-perception of functional mobility deficits  Patient will improve bilateral lower extremity MMT grades by >/=1 grade in order to improve strength for ADL completion  Patient will improve cervical AROM in plane of rotation by >/= 10 degrees in order to improve functional mobility for activities such as driving  Patient will complete >/=30 seconds of VOR activity at >/=150 bpm with no increase in baseline dizziness in order to improve vestibular function for fixation tasks   Patient will complete all components of m-CTSIB with </=moderate postural sway in order to improve balance in dim/dark environments  Patient will note neck/suboccipital pain at worst as </=1/10 over 3 week period in order to improve general activity tolerance and quality of life    Plan     Plan to cont with progression of PT goals per POC.    Rowena Jade, PTA

## 2023-10-23 ENCOUNTER — CLINICAL SUPPORT (OUTPATIENT)
Dept: REHABILITATION | Facility: HOSPITAL | Age: 68
End: 2023-10-23
Payer: OTHER MISCELLANEOUS

## 2023-10-23 DIAGNOSIS — H81.90 VESTIBULOPATHY, UNSPECIFIED LATERALITY: ICD-10-CM

## 2023-10-23 DIAGNOSIS — R29.898 DECREASED ROM OF NECK: Primary | ICD-10-CM

## 2023-10-23 PROCEDURE — 97112 NEUROMUSCULAR REEDUCATION: CPT | Mod: PN,CQ

## 2023-10-23 PROCEDURE — 97110 THERAPEUTIC EXERCISES: CPT | Mod: PN,CQ

## 2023-10-23 NOTE — PROGRESS NOTES
" [facility]   Physical Therapy Treatment Note      Name: Carlin Madrigal  Clinic Number: [id number]    Therapy Diagnosis:   Encounter Diagnoses   Name Primary?    Decreased ROM of neck Yes    Vestibulopathy, unspecified laterality      Physician: Arti Roberts MD    Visit Date: 10/23/2023    Physician Orders: PT Eval and Treat   Medical Diagnosis from Referral: Acute post-traumatic headache, not intractable [G44.319], Post concussion syndrome [F07.81]  Evaluation Date: 10/11/2023  Authorization Period Expiration: 3/19/2024  Plan of Care Expiration: 11/24/2023  Progress Note Due: 11/11/2023  Visit # / Visits authorized: 3/12  Foto: # 1/3    Time In: 11:15 AM  Time Out: 12:00 PM  Total Appointment Time (timed & untimed codes): 45 minutes (2 TE + 1 NMR)     Precautions: Standard    PTA Visit #: 2/5       Subjective     Patient reports: R sided posterior neck and head pain has since resolved.  No issues with HEP.  He was compliant with home exercise program.  Response to previous treatment: initial eval  Functional change: ongoing    Pain: 0/10  Location: right neck and posterior head      Objective      Objective Measures updated at progress report unless specified.     Treatment     Carlin received the treatments listed below:      therapeutic exercises to develop strength, endurance, ROM, and flexibility for 25 minutes including:  Seated:  UT stretch 2x30" B  Levator scap stretch: 2x30" B  Cervical AROM wit 5" holds x5 in each direction  Scap retracts 3" holds 2x10  No monies with red theraband 2x10    Supine:  Chin tucks 5" holds 2 trials of 1' ea  Horizontal abduction with red theraband: 2x10  Serratus punches with 2# dowel 2x10 with 2-3" holds    Standing:  Y slides on wall with lift x 10  Rows 2x10 with red theraband  Lateral Pulldown 2x10 with red therband    manual therapy techniques: Soft tissue Mobilization were applied to the: suboccipital, UT, cervical paraspinals for 00 " "minutes, including:  Not performed today     neuromuscular re-education activities to improve: Balance, Coordination, Kinesthetic, and Posture for 20 minutes. The following activities were included:  VOR x1 3x30" (horizontal/vertical) paced at 80 bpm and standing  Narrow base of support on airex (eyes open/eyes closed) 2x30"                                                       Head turns (horizontal/vertical) 2x30" ea    therapeutic activities to improve functional performance for 00  minutes, including:  Not performed today    Patient Education and Home Exercises       Education provided:   - daily HEP compliance    Written Home Exercises Provided: Patient instructed to cont prior HEP. Exercises were reviewed and Carlin was able to demonstrate them prior to the end of the session.  Carlin demonstrated good  understanding of the education provided. See Electronic Medical Record under Patient Instructions for exercises provided during therapy sessions    Assessment     Carlin arrived to session with resolved R sided neck pain and was agreeable to treatment.  Session consisted of periscapular and postural strengthening, mostly mat based, completed correctly and with minimal cuing.  Some mild cervical proprioception deficits noted during VOR activity with minimal improvement following cuing.  Completed standing and paced at 80 bpm this session, no exacerbation of dizziness.  No exacerbation of neck pain reported with any exercises.  Patient wore appropriate shoe wear this session so balance training completed this session without any loss of balance observed.  He would benefit from continued PT services to achieve goals set at evaluation.    Carlin Is progressing well towards his goals.   Patient prognosis is Good.     Patient will continue to benefit from skilled outpatient physical therapy to address the deficits listed in the problem list box on initial evaluation, provide pt/family education and to maximize " pt's level of independence in the home and community environment.     Patient's spiritual, cultural and educational needs considered and pt agreeable to plan of care and goals.     Anticipated barriers to physical therapy: chronicity of current injury    Goals:   Short Term Goals: 3 weeks  Patient will be 100% compliant with HEP in order to maximize PT benefits  Patient will improve bilateral lower extremity MMT grades by >/=1/2 grade in order to improve strength for ADL completion  Patient will improve cervical AROM in plane of rotation by >/= 5 degrees in order to improve functional mobility for activities such as driving  Patient will complete >/=30 seconds of VOR activity at >/=120 bpm with no increase in baseline dizziness in order to improve vestibular function for fixation tasks   Patient will note neck/suboccipital pain at worst as </=3/10 over 3 week period in order to improve general activity tolerance and quality of life     Long Term Goals: 6 weeks   Patient will score >/= 73% on FOTO survey in order to improve self-perception of functional mobility deficits  Patient will improve bilateral lower extremity MMT grades by >/=1 grade in order to improve strength for ADL completion  Patient will improve cervical AROM in plane of rotation by >/= 10 degrees in order to improve functional mobility for activities such as driving  Patient will complete >/=30 seconds of VOR activity at >/=150 bpm with no increase in baseline dizziness in order to improve vestibular function for fixation tasks   Patient will complete all components of m-CTSIB with </=moderate postural sway in order to improve balance in dim/dark environments  Patient will note neck/suboccipital pain at worst as </=1/10 over 3 week period in order to improve general activity tolerance and quality of life    Plan     Plan to cont with progression of PT goals per POC.    Rowena Jade, PTA

## 2023-10-25 ENCOUNTER — CLINICAL SUPPORT (OUTPATIENT)
Dept: REHABILITATION | Facility: HOSPITAL | Age: 68
End: 2023-10-25
Payer: OTHER MISCELLANEOUS

## 2023-10-25 DIAGNOSIS — H81.90 VESTIBULOPATHY, UNSPECIFIED LATERALITY: ICD-10-CM

## 2023-10-25 DIAGNOSIS — R29.898 DECREASED ROM OF NECK: Primary | ICD-10-CM

## 2023-10-25 PROCEDURE — 97110 THERAPEUTIC EXERCISES: CPT | Mod: PN

## 2023-10-25 PROCEDURE — 97112 NEUROMUSCULAR REEDUCATION: CPT | Mod: PN

## 2023-10-25 NOTE — PROGRESS NOTES
" [facility]   Physical Therapy Treatment Note      Name: Carlin Madrigal  Clinic Number: [id number]    Therapy Diagnosis:   Encounter Diagnoses   Name Primary?    Decreased ROM of neck Yes    Vestibulopathy, unspecified laterality      Physician: Arti Roberts MD    Visit Date: 10/25/2023    Physician Orders: PT Eval and Treat   Medical Diagnosis from Referral: Acute post-traumatic headache, not intractable [G44.319], Post concussion syndrome [F07.81]  Evaluation Date: 10/11/2023  Authorization Period Expiration: 3/19/2024  Plan of Care Expiration: 11/24/2023  Progress Note Due: 11/11/2023  Visit # / Visits authorized: 4/12  Foto: # 1/3    Time In: 11:23 AM  Time Out: 12:02 PM  Total Appointment Time (timed & untimed codes): 39 minutes (2 TE + 1 NMR)     Precautions: Standard    PTA Visit #: 0/5     Subjective     Patient reports: No pain in the right side of his neck, just occasional discomfort at this point. Still would like to work on neck and balance.  He was compliant with home exercise program.  Response to previous treatment: no adverse response  Functional change: improved cervical rotation    Pain: 0/10  Location: right neck and posterior head      Objective      Objective Measures updated at progress report unless specified.     CERVICAL SPINE AROM: (updated values in parentheses)   Flexion: (norm: 80-90 deg) 70 deg (65)   Extension: (norm: 70-80 deg) 49 deg (40)   Left Sidebend: (norm: 20-45 deg) 27 deg (30)   Right Sidebend: (norm: 20-45 deg) 25 deg (25)   Left Rotation: (norm: 70-90 deg) 65 deg (79)   Right Rotation: (norm: 70-90 deg) 59 deg (64)      Treatment     Carlin received the treatments listed below:      therapeutic exercises to develop strength, endurance, ROM, and flexibility for 25 minutes including:    Seated:  Upper trap stretch 2x45" right only today  Levator scap stretch: 2x45" right only  Right rotation SNAGs x8 with 5" holds  No monies with red theraband " "2x10    Supine:  Horizontal abduction with red theraband + chin tuck hold for each rep: 2x10  Serratus punches with 2# dowel 2x10 with 2-3" holds    NOT TODAY  Cervical AROM wit 5" holds x5 in each direction  Scap retracts 3" holds 2x10  Y slides on wall with lift x 10  Rows 2x10 with red theraband  Lateral Pulldown 2x10 with red therband    manual therapy techniques: Soft tissue Mobilization were applied to the: suboccipital, UT, cervical paraspinals for 00 minutes, including:  Not performed today     neuromuscular re-education activities to improve: Balance, Coordination, Kinesthetic, and Posture for 12 minutes. The following activities were included:  VOR x1 3x30" (horizontal/vertical) paced at 100 bpm and standing on Airex  Narrow base of support on Airex + eyes closed 3x30"  Walking + VORx1 2x45' each direction (horizontal/vertical)     therapeutic activities to improve functional performance for 00  minutes, including:  Not performed today    Patient Education and Home Exercises       Education provided:   - daily HEP compliance    Written Home Exercises Provided: Patient instructed to cont prior HEP. Exercises were reviewed and Carlin was able to demonstrate them prior to the end of the session.  Carlin demonstrated good  understanding of the education provided. See Electronic Medical Record under Patient Instructions for exercises provided during therapy sessions    Assessment     Carlin completed session with absent neck pain but occasional discomfort still noted outside of therapy. Improved cervical AROM noted since evaluation but still limited on right side. Progressed paced VOR activity without visual slippage noted but moderate-high levels of postural sway still noted on compliant surfaces. He would benefit from continued PT services to achieve functional goals.    Carlin Is progressing well towards his goals.   Patient prognosis is Good.     Patient will continue to benefit from skilled outpatient " physical therapy to address the deficits listed in the problem list box on initial evaluation, provide pt/family education and to maximize pt's level of independence in the home and community environment.     Patient's spiritual, cultural and educational needs considered and pt agreeable to plan of care and goals.     Anticipated barriers to physical therapy: chronicity of current injury    Goals:   Short Term Goals: 3 weeks  Patient will be 100% compliant with HEP in order to maximize PT benefits (met)  Patient will improve cervical AROM in plane of rotation by >/= 5 degrees in order to improve functional mobility for activities such as driving (met)  Patient will complete >/=30 seconds of VOR activity at >/=120 bpm with no increase in baseline dizziness in order to improve vestibular function for fixation tasks (progressing, not met)  Patient will note neck/suboccipital pain at worst as </=3/10 over 3 week period in order to improve general activity tolerance and quality of life (progressing, not met)     Long Term Goals: 6 weeks   Patient will score >/= 73% on FOTO survey in order to improve self-perception of functional mobility deficits (progressing, not met)  Patient will improve cervical AROM in plane of rotation by >/= 10 degrees in order to improve functional mobility for activities such as driving (progressing, not met)  Patient will complete >/=30 seconds of VOR activity at >/=150 bpm with no increase in baseline dizziness in order to improve vestibular function for fixation tasks (progressing, not met)  Patient will complete all components of m-CTSIB with </=moderate postural sway in order to improve balance in dim/dark environments (progressing, not met)  Patient will note neck/suboccipital pain at worst as </=1/10 over 3 week period in order to improve general activity tolerance and quality of life (progressing, not met)    Plan     Plan to cont with progression of PT goals per POC.    JONNY DEMPSEY,  PT

## 2023-10-30 ENCOUNTER — CLINICAL SUPPORT (OUTPATIENT)
Dept: REHABILITATION | Facility: HOSPITAL | Age: 68
End: 2023-10-30
Payer: OTHER MISCELLANEOUS

## 2023-10-30 DIAGNOSIS — R29.898 DECREASED ROM OF NECK: Primary | ICD-10-CM

## 2023-10-30 DIAGNOSIS — H81.90 VESTIBULOPATHY, UNSPECIFIED LATERALITY: ICD-10-CM

## 2023-10-30 PROCEDURE — 97110 THERAPEUTIC EXERCISES: CPT | Mod: PN

## 2023-10-30 PROCEDURE — 97112 NEUROMUSCULAR REEDUCATION: CPT | Mod: PN

## 2023-10-30 NOTE — PROGRESS NOTES
"  [facility]   Physical Therapy Treatment Note      Name: Carlin Madrigal  Clinic Number: [id number]    Therapy Diagnosis:   Encounter Diagnoses   Name Primary?    Decreased ROM of neck Yes    Vestibulopathy, unspecified laterality      Physician: Arti Roberts MD    Visit Date: 10/30/2023    Physician Orders: PT Eval and Treat   Medical Diagnosis from Referral: Acute post-traumatic headache, not intractable [G44.319], Post concussion syndrome [F07.81]  Evaluation Date: 10/11/2023  Authorization Period Expiration: 3/19/2024  Plan of Care Expiration: 11/24/2023  Progress Note Due: Last assessed 10/25/2023  Visit # / Visits authorized: 5/12  Foto: # 1/3 next    Time In: 8:37 AM  Time Out: 9:27 AM  Total Appointment Time (timed & untimed codes): 50 minutes (3 TE + 1 NMR)     Precautions: Standard    PTA Visit #: 0/5     Subjective     Patient reports: Just with pinpoint neck discomfort where he originally struck his head. Otherwise no complaints. Balance steadily improving.  He was compliant with home exercise program.  Response to previous treatment: no adverse response  Functional change: improved cervical rotation    Pain: 0/10  Location: right neck and posterior head      Objective      Objective Measures updated at progress report unless specified.      Treatment     Carlin received the treatments listed below:      therapeutic exercises to develop strength, endurance, ROM, and flexibility for 38 minutes including:    Seated:  Upper trap stretch 2x30" bilaterally  Levator scap stretch: 2x30" bilaterally  Rotation SNAGs x5B with 10" holds  No monies with red theraband 2x15    Supine:  Horizontal abduction with red theraband + chin tuck hold for each rep: 2x12  Serratus punches with 3# dowel 2x15 with 2-3" holds    Standing:  Rows 2x15 with green theraband  Straight arm pulldown 2x15 with red therband  Standing T's with red theraband x10  Y slides on wall with lift x 10    manual therapy " "techniques: Soft tissue Mobilization were applied to the: suboccipital, UT, cervical paraspinals for 00 minutes, including:  Not performed today     neuromuscular re-education activities to improve: Balance, Coordination, Kinesthetic, and Posture for 12 minutes. The following activities were included:  VOR x1 3x30" (horizontal/vertical) paced at 110 bpm and standing on Airex  Narrow base of support on Airex + eyes closed + head turns 2x30" each direction (horizontal and vertical)  Walking + VORx1 2x45' each direction (horizontal/vertical)     therapeutic activities to improve functional performance for 00  minutes, including:  Not performed today    Patient Education and Home Exercises       Education provided:   - daily HEP compliance    Written Home Exercises Provided: Patient instructed to cont prior HEP. Exercises were reviewed and Carlin was able to demonstrate them prior to the end of the session.  Carlin demonstrated good  understanding of the education provided. See Electronic Medical Record under Patient Instructions for exercises provided during therapy sessions    Assessment     Carlin tolerated progressions in today's program with appropriate levels of fatigue. Mild right shoulder discomfort with end range right horizontal abduction; improved with exercise modification. Sensory integration skills gradually improving and no visual slippage noted with progressed VOR activity. He is progressing well and would benefit from continued PT services to achieve functional goals.    Carlin Is progressing well towards his goals.   Patient prognosis is Good.     Patient will continue to benefit from skilled outpatient physical therapy to address the deficits listed in the problem list box on initial evaluation, provide pt/family education and to maximize pt's level of independence in the home and community environment.     Patient's spiritual, cultural and educational needs considered and pt agreeable to plan of " care and goals.     Anticipated barriers to physical therapy: chronicity of current injury    Goals:   Short Term Goals: 3 weeks  Patient will be 100% compliant with HEP in order to maximize PT benefits (met)  Patient will improve cervical AROM in plane of rotation by >/= 5 degrees in order to improve functional mobility for activities such as driving (met)  Patient will complete >/=30 seconds of VOR activity at >/=120 bpm with no increase in baseline dizziness in order to improve vestibular function for fixation tasks (progressing, not met)  Patient will note neck/suboccipital pain at worst as </=3/10 over 3 week period in order to improve general activity tolerance and quality of life (progressing, not met)     Long Term Goals: 6 weeks   Patient will score >/= 73% on FOTO survey in order to improve self-perception of functional mobility deficits (progressing, not met)  Patient will improve cervical AROM in plane of rotation by >/= 10 degrees in order to improve functional mobility for activities such as driving (progressing, not met)  Patient will complete >/=30 seconds of VOR activity at >/=150 bpm with no increase in baseline dizziness in order to improve vestibular function for fixation tasks (progressing, not met)  Patient will complete all components of m-CTSIB with </=moderate postural sway in order to improve balance in dim/dark environments (progressing, not met)  Patient will note neck/suboccipital pain at worst as </=1/10 over 3 week period in order to improve general activity tolerance and quality of life (progressing, not met)    Plan     Plan to cont with progression of PT goals per POC.    JONNY DEMPSEY, PT

## 2023-11-06 ENCOUNTER — CLINICAL SUPPORT (OUTPATIENT)
Dept: REHABILITATION | Facility: HOSPITAL | Age: 68
End: 2023-11-06
Payer: OTHER MISCELLANEOUS

## 2023-11-06 DIAGNOSIS — R29.898 DECREASED ROM OF NECK: Primary | ICD-10-CM

## 2023-11-06 DIAGNOSIS — H81.90 VESTIBULOPATHY, UNSPECIFIED LATERALITY: ICD-10-CM

## 2023-11-06 PROCEDURE — 97110 THERAPEUTIC EXERCISES: CPT | Mod: PN,CQ

## 2023-11-06 PROCEDURE — 97112 NEUROMUSCULAR REEDUCATION: CPT | Mod: PN,CQ

## 2023-11-06 NOTE — PROGRESS NOTES
"  [facility]   Physical Therapy Treatment Note      Name: Carlin Madrigal  Clinic Number: [id number]    Therapy Diagnosis:   Encounter Diagnoses   Name Primary?    Decreased ROM of neck Yes    Vestibulopathy, unspecified laterality      Physician: Arti Roberts MD    Visit Date: 11/6/2023    Physician Orders: PT Eval and Treat   Medical Diagnosis from Referral: Acute post-traumatic headache, not intractable [G44.319], Post concussion syndrome [F07.81]  Evaluation Date: 10/11/2023  Authorization Period Expiration: 3/19/2024  Plan of Care Expiration: 11/24/2023  Progress Note Due: Last assessed 10/25/2023  Visit # / Visits authorized: 6/12  Foto: # 2/3 (completed 2nd survey 11/6)     Time In: 8:45 AM  Time Out: 9:38 AM  Total Appointment Time (timed & untimed codes): 53 minutes (3 TE + 1 NMR)     Precautions: Standard    PTA Visit #: 1/5     Subjective     Patient reports: Localized right sided neck discomfort persists. Describes it as "burning, aching" but "not painful, more annoying."  He has next neurologist appointment on Wednesday.  He was compliant with home exercise program.  Response to previous treatment: no adverse response  Functional change: improved cervical rotation    Pain: 0/10  Location: right neck and posterior head      Objective      Objective Measures updated at progress report unless specified.      Treatment     Carlin received the treatments listed below:      therapeutic exercises to develop strength, endurance, ROM, and flexibility for 38 minutes including:  FOTO Survey completion    Seated:  Upper trap stretch 2x30" bilaterally  Levator scap stretch: 2x30" bilaterally  Rotation SNAGs x5B with 10" holds  No monies with red theraband 2x15    Supine:  Chin tuck with head lift 2-3" holds x10  Chin tuck with cervical rotation x10 ea direction  Horizontal abduction with red theraband + chin tuck hold for each rep: 2x12  Serratus punches with 3# dowel 2x15 with 2-3" " "holds    Standing:  Rows 2x15 with green theraband  Straight arm pulldown + chin tuck hold for each rep 2x15 with green theraband  Unilateral shoulder extension with contralateral cervical rotation with green theraband x15 ea direction  Standing T's with red theraband x10  Y slides on wall with lift x 10    manual therapy techniques: Soft tissue Mobilization were applied to the: suboccipital, UT, cervical paraspinals for 00 minutes, including:  Not performed today     neuromuscular re-education activities to improve: Balance, Coordination, Kinesthetic, and Posture for 15 minutes. The following activities were included:  VOR x1 3x30" (horizontal/vertical) paced at 110 bpm and standing on Airex  Narrow base of support on Airex + eyes closed + head turns 2x30" each direction (horizontal and vertical)  Walking + VORx1 2x45' each direction (horizontal/vertical)     therapeutic activities to improve functional performance for 00  minutes, including:  Not performed today    Patient Education and Home Exercises       Education provided:   - daily HEP compliance    Written Home Exercises Provided: Patient instructed to cont prior HEP. Exercises were reviewed and Carlin was able to demonstrate them prior to the end of the session.  Carlin demonstrated good  understanding of the education provided. See Electronic Medical Record under Patient Instructions for exercises provided during therapy sessions    Assessment     Carlin arrived to session without any complaints of pain and was agreeable to treatment.  Denies any headache or dizziness upon arrival but continues to report persistent localized discomfort at site of injury. Session focused on postural strengthening and improving cervical mobility.  Progressions noted in bold completed without adverse response and the appropriate levels of fatigue achieved.  No reports of pain with any exercises this session. He is progressing well and would benefit from continued PT " services to achieve functional goals.    Carlin Is progressing well towards his goals.   Patient prognosis is Good.     Patient will continue to benefit from skilled outpatient physical therapy to address the deficits listed in the problem list box on initial evaluation, provide pt/family education and to maximize pt's level of independence in the home and community environment.     Patient's spiritual, cultural and educational needs considered and pt agreeable to plan of care and goals.     Anticipated barriers to physical therapy: chronicity of current injury    Goals:   Short Term Goals: 3 weeks  Patient will be 100% compliant with HEP in order to maximize PT benefits (met)  Patient will improve cervical AROM in plane of rotation by >/= 5 degrees in order to improve functional mobility for activities such as driving (met)  Patient will complete >/=30 seconds of VOR activity at >/=120 bpm with no increase in baseline dizziness in order to improve vestibular function for fixation tasks (progressing, not met)  Patient will note neck/suboccipital pain at worst as </=3/10 over 3 week period in order to improve general activity tolerance and quality of life (progressing, not met)     Long Term Goals: 6 weeks   Patient will score >/= 73% on FOTO survey in order to improve self-perception of functional mobility deficits (progressing, not met)  Patient will improve cervical AROM in plane of rotation by >/= 10 degrees in order to improve functional mobility for activities such as driving (progressing, not met)  Patient will complete >/=30 seconds of VOR activity at >/=150 bpm with no increase in baseline dizziness in order to improve vestibular function for fixation tasks (progressing, not met)  Patient will complete all components of m-CTSIB with </=moderate postural sway in order to improve balance in dim/dark environments (progressing, not met)  Patient will note neck/suboccipital pain at worst as </=1/10 over 3 week  period in order to improve general activity tolerance and quality of life (progressing, not met)    Plan     Plan to cont with progression of PT goals per POC.    Rowena Jade, PTA

## 2023-11-08 ENCOUNTER — OFFICE VISIT (OUTPATIENT)
Dept: NEUROLOGY | Facility: CLINIC | Age: 68
End: 2023-11-08
Payer: OTHER MISCELLANEOUS

## 2023-11-08 VITALS — SYSTOLIC BLOOD PRESSURE: 156 MMHG | DIASTOLIC BLOOD PRESSURE: 82 MMHG | HEART RATE: 78 BPM

## 2023-11-08 DIAGNOSIS — M54.81 OCCIPITAL NEURITIS: ICD-10-CM

## 2023-11-08 DIAGNOSIS — R41.89 COGNITIVE CHANGE: ICD-10-CM

## 2023-11-08 DIAGNOSIS — R26.89 IMBALANCE: ICD-10-CM

## 2023-11-08 DIAGNOSIS — S06.0X9S CONCUSSION WITH LOSS OF CONSCIOUSNESS, SEQUELA: ICD-10-CM

## 2023-11-08 DIAGNOSIS — G47.9 FATIGUE DUE TO SLEEP PATTERN DISTURBANCE: ICD-10-CM

## 2023-11-08 DIAGNOSIS — R53.83 FATIGUE DUE TO SLEEP PATTERN DISTURBANCE: ICD-10-CM

## 2023-11-08 DIAGNOSIS — S13.4XXD WHIPLASH INJURY TO NECK, SUBSEQUENT ENCOUNTER: ICD-10-CM

## 2023-11-08 DIAGNOSIS — S06.6X9S SUBARACHNOID HEMORRHAGE FOLLOWING INJURY, WITH LOSS OF CONSCIOUSNESS, SEQUELA: Primary | ICD-10-CM

## 2023-11-08 DIAGNOSIS — M54.2 CERVICALGIA OF OCCIPITO-ATLANTO-AXIAL REGION: ICD-10-CM

## 2023-11-08 PROCEDURE — 99999 PR PBB SHADOW E&M-EST. PATIENT-LVL III: ICD-10-PCS | Mod: PBBFAC,,, | Performed by: PSYCHIATRY & NEUROLOGY

## 2023-11-08 PROCEDURE — 99213 OFFICE O/P EST LOW 20 MIN: CPT | Mod: S$GLB,,, | Performed by: PSYCHIATRY & NEUROLOGY

## 2023-11-08 PROCEDURE — 99999 PR PBB SHADOW E&M-EST. PATIENT-LVL III: CPT | Mod: PBBFAC,,, | Performed by: PSYCHIATRY & NEUROLOGY

## 2023-11-08 PROCEDURE — 99213 PR OFFICE/OUTPT VISIT, EST, LEVL III, 20-29 MIN: ICD-10-PCS | Mod: S$GLB,,, | Performed by: PSYCHIATRY & NEUROLOGY

## 2023-11-08 NOTE — PROGRESS NOTES
Chief Complaint: Follow-up    Subjective:     History of Present Illness    Referring Provider: ED  Date of Injury: 6/13/23  Accompanied by: No one  Workers Comp     09/11/2023: Carlin Madrigal is a 68 y.o. male with h/o DM, HTN, HLD who presents for concussion evaluation. 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 Currently, headaches are right occipital, can last all day, 2-3 times a week, pounding. Per daughter, he has described head as bloated. He has a scar on right side of neck from the above injury. He has lower neck pain that is new since above injury. He has associated phonophobia per daughter, weakness in legs, imbalance that is new since above injury, spinning that was present prior to injury that is worse since above injury. He denies photophobia, numbness, tingling, lightheadedness, N/V. He has been having dry mouth. He notes fluctuating vision from diabetes and denies vision changes since above injury. He denies changes in taste, smell, hearing, other vision changes, appetite. He denies tinnitus. Per daughter, he gets bilateral jaw pain for the last 3 weeks that is sharp that is new since above injury and has looked swollen when eating before. He denies cognitive fogginess, concentration difficulties, and memory changes. He is sleeping well and wakes up rested. He has daytime fatigue. He snores and denies it worsening since above injury and denies apnea. He denies positional component but headache worsens when lays on right side and vasal vagal maneuvers do not significantly worsen headaches. He denies headaches waking him up and  does not wake up with headaches. Mood is okay and per daughter he is easily irritable since the above injury that he agrees with. He denies emotional lability. He has only tried Tylenol. He was just prescribed PT to help with headaches. He denies other prior head and neck injuries. Prior to current injury, headaches would be once every 2 weeks from being out in the sun and no associated photophobia, phonophobia, weakness, numbness, tingling, N/V, change in vision, aura and would not stop ADLs. Glucose most recently 124 and has been 160s and 180s.     Per ED note dated 6/13/23, per daughter he passed out at work today per coworkers and upon awakening could not remember where he was or what happened, he reports becoming dizzy but does not remember passing out     Per ED note dated 5/25/23, he has had 1 syncopal episode, has had intermittent blurred vision and dizziness described as lightheadedness that started 1 week ago, 1 week of right shoulder pain, and neck pain that began 2 weeks ago and denies trauma or injuries to the area and was sudden onset.     09/27/2023: Carlin Madrigal is a 68 y.o. male  with h/o DM, HTN, HLD, SAH and concussion with LOC, kinetic force injury with resultant cranio cervical trauma and occipital neuritis s/p medrol dose pack x1 who presents for follow up. On last visit, patient was prescribed a Medrol dose pack and started on ice therapy, ergonomics, and exercise restrictions and referred for vestibular PT and not to start neck PT and counseled on neuropathy precautions. In the interim, stopped Medrol as glucose went > 200. Offered patient translational services if needed and he declines at this time and let him know it is a free service should he choose to need it during the visit. He feels better compared to when injury first happened. Headaches are 1-3 times a week per a calendar he shows me, right occipital, cannot describe pain quality, lasts whole day but not into next day. He denies  neck pain. He describes generalized weakness. He denies photophobia, phonophobia, numbness, tingling, dizziness, N/V. He thinks he has difficulties seeing from diabetes and denies changes from his injury. He is sleeping well and wakes up feeling well. Mood is okay. He stopped taking Medrol on day 3 of the pack as his glucose was going > 200 and denies side effects. He is icing. He has not heard about vestibular PT. He is not doing neck PT. He is following neuropathy precautions.     11/08/2023: Carlin Madrigal is a 68 y.o. male with h/o DM, HTN, HLD, SAH and concussion with LOC, kinetic force injury with resultant cranio cervical trauma and occipital neuritis s/p medrol dose pack x1 who presents for follow up. On last visit, patient was referred for lower neck/upper back to mid back PT with myofascial release and continued on ice therapy, ergonomics, and exercise restrictions and referred for vestibular PT and counseled on neuropathy precautions. Offered patient translational services if needed and he declines at this time and let him know it is a free service should he choose to need it during the visit. He states that his head does not hurt but describes pins and needles sensation in right occipital region that can happen multiple times a day or not at all and feels it when turns head to the right and lasts 3-4 minutes. He denies neck pain. He denies photophobia, phonophobia, numbness, tingling, N/V, dizziness. He describes difficulties with far vision that he thinks may be from his diabetes. He describes generalized weakness and describes will get cramp in right posterior leg when tries to lift something. He is sleeping well and wakes up rested. Mood is good. He is doing neck PT and is helping and is doing vestibular PT that is helping but still feels imbalance.  He is following neuropathy precautions. He is not icing. He describes bilateral jaw pain and states jaw will get swollen when tries to chew. He states he  has upcoming appointment with dentist. He has new feeling that smells are staying in his nose.    Current Outpatient Medications on File Prior to Visit   Medication Sig Dispense Refill    amLODIPine (NORVASC) 5 MG tablet Take 1 tablet (5 mg total) by mouth once daily. 30 tablet 1    diclofenac sodium (VOLTAREN) 1 % Gel Apply 2 g topically 3 (three) times daily. 100 g 0    gabapentin (NEURONTIN) 300 MG capsule Take 300 mg by mouth 3 (three) times daily.      lisinopriL (PRINIVIL,ZESTRIL) 40 MG tablet Take 40 mg by mouth Daily.      metFORMIN (GLUCOPHAGE) 1000 MG tablet Take 1,000 mg by mouth 2 (two) times a day.      NOVOLIN 70/30 U-100 INSULIN 100 unit/mL (70-30) injection Inject 15 Units into the skin 2 (two) times a day.      pravastatin (PRAVACHOL) 20 MG tablet Take 20 mg by mouth Daily.       No current facility-administered medications on file prior to visit.       Review of patient's allergies indicates:  No Known Allergies    History reviewed. No pertinent family history.    Social History     Tobacco Use    Smoking status: Former     Types: Cigarettes     Passive exposure: Never    Smokeless tobacco: Never   Substance Use Topics    Alcohol use: Not Currently     Comment: 06/13/23: occ    Drug use: Not Currently       Review of Systems  Constitutional: No fevers, no chills, no change in weight  Eye/Vision: See HPI  Ear/Nose/Mouth/Throat: See HPI; no cough, no runny nose, no sore throat  Respiratory: No shortness of breath, no problems breathing  Cardiovascular: No chest pain  Gastrointestinal: See HPI, no diarrhea, no constipation  Genitourinary: No dysuria  Musculoskeletal: See HPI  Integumentary: No skin changes  Neurologic: See HPI  Psychiatric: Denies depression, denies anxiety, denies SI and HI.  Additional System Information: (Decreased concentration.)    Objective:     Vitals:    11/08/23 0943   BP: (!) 156/82   Pulse: 78       General: Alert and awake, Well nourished, Well groomed, No acute distress,  "no photophobia with 60 Hz hypersensitivity.  Eyes: Extraocular movements are intact; Normal conjunctiva; no nystagmus; Visual fields are intact bilaterally to finger counting in all cardinal directions  Neck: Supple  No Stiffness  Patient has occipital point tenderness over the right lesser occipital nerve without induction of headaches with no jump sign and no twitch response and no referred pain: trace   No high, medial cervical pain with lateral movement of C1 over C2 and with isometric neck flexion and extension  Fluid patient turnaround with concurrent neck movement in direction of torso movement.  Bilateral paraspinal cervical muscle spasm present  Spine/torso exam: Spine/ torso exam is within normal limits     Neurologic Exam  no saccadic intrusions of volitional ocular smooth pursuits  no pain with sustained upgaze and convergence  no visual motion sensitivity/dizziness produced with rapid eye movements or neck movements  no convergence insufficiency with no diplopia developed > 5 " accommodation    Sensory: Negative Romberg, no falls on tandem stance    Gait: Gait WNL, Heel to toe walking WNL    Labs:    No new labs    Imaging:    No new studies    Assessment:       ICD-10-CM ICD-9-CM    1. Subarachnoid hemorrhage following injury, with loss of consciousness, sequela  S06.6X9S 907.0       2. Concussion with loss of consciousness, sequela  S06.0X9S 907.0       3. Whiplash injury to neck, subsequent encounter  S13.4XXD V58.89      847.0       4. Cervicalgia of [id number]-[id number]-[id number] region  M54.2 723.1       5. Occipital neuritis  M54.81 723.8       6. Cognitive change  R41.89 799.59       7. Fatigue due to sleep pattern disturbance  R53.83 780.79     G47.9 780.50       8. Imbalance  R26.89 781.2          68 y.o.  male with h/o DM, HTN, HLD, SAH and concussion with LOC, kinetic force injury with resultant cranio cervical trauma and occipital neuritis s/p medrol dose pack x1 who presents for follow up. Imaging " from 6/13/23 injury revealed bilateral frontal and left temporal and right parietoccipital and right cerebellar hemorrhagic contusions with small amount of thin subdural blood along the falx and tentorium with left frontal lobe SAH and small SAH in right posterior frontoparietal region. On exam, he has occipital point tenderness over the right lesser occipital nerve without induction of headaches with no jump sign and no twitch response and no referred pain, right cervical paraspinal muscle spasm and on initial exam vibratory loss in fingers and distal lower extremities, positive right head thrust. We discussed previously that he had a TBI given imaging evidence. We discussed previously based on imaging evidence, he may have problems with memory and executive functioning but he is not complaining of those functions at this time. We discussed previously exam also indicative of long fiber neuropathy that is likely 2/2 to DM and not related to his injury. Overall, his symptoms are improving and is mainly muscular at this time. We discussed he is describing paresthesias where he hit his head and that a crushed nerve injury may have occurred in the scalp that can take up to 2 years to fully heal or may be permanent and should the paresthesias bother him or last longer, we could do medication for it.    Plan:     Continue lower neck/upper back to mid back PT with myofascial release (deep tissue massage, cupping, dry needling, etc). No manipulation of suboccipital region.  The patient was instructed to ice the occipital region for no more than 20 minutes at least once a day but may repeat this as many times as they would like.   Discussed ergonomic accommodations for occipital neuritis/neuralgia. Mainly perform all work at eye level to minimize continued neck flexion which will aggravate the nerve.  Patient was encouraged to do daily light cardio exercise but instructed to limit physical activities to walking, walking in  water up to the waist only or riding a stationary bike, recumbent preferred. No weight lifting in upper body, no neck massage, no acupuncture of neck, and no dry needling of upper neck. No neck PT unless otherwise stated. No chiropractor work on neck. Lower body strengthening with resistant bands, leg machines, and strapping weights to legs okay. No core body workouts. No running or use of cardio equipment other than stationary bike. No swimming or body surfing. No amusement park rides. No lifting more than 5-10 lbs and bend at the knees, not the waist.  Discussed care plan in detail for post traumatic occipital neuritis including a trial of oral medications followed by series of trigger point steroid injections with occipital nerve blocks. To be referred for consultation for occipital nerve release procedure if initially clinically responsive to injections but always with a return of symptoms.  Continue vestibular PT for balance and eye movements  You do not feel your feet well due to neuropathy. Please wear footwear with good tread at all times. This recommendation includes wearing socks with tread on them. Make sure all of your walkways, hallways, and julio are well lit at all times day and night. Use night lights to light up commonly used pathways in your home, meaning from the bedroom to the bathroom or kitchen. Make sure all of your walkways, hallways, and julio are clear of obstacles at all times. Obstacles include decorations, rugs/mats, pet beds, shoes, and clothes. Make sure you have good hand holds to grab onto as you walk around your home.  It is with a high degree of medical certainty that this patient's current signs and symptoms were caused or exacerbated by the work related injury mentioned in the note above  The patient can do light duty desk work only with the above restrictions at this time  Any delay in treatment that I am recommending for the patient's work related injury as the result of  things like IMEs, FCEs, and denials in the care plan may delay the patient's recovery. Delays in recovery may cause or further worsen any underlying permanent injury that was caused by the result of the patient's injury. Delays in recovery may also cause the development of new medical conditions that will then need to be treated. The development of some forms of permanent injuries may result in nerve injuries that are refractory to initial treatment, which could result in further medical expenses as more expensive methods of treatment or prolonged treatment may be needed to treat the underlying injury. In my collective clinical experience, my care plan as documented from the initial note leads to significant recovery from injuries similar to the patient's in the majority of my other patients. Delays in recovery will prevent the patient from returning to work full time in a timely fashion. Delays in recovery caused by workers compensation for patients with similar injury have also lead to significant financial settlements for the patients should a legal dispute arise. I have counseled the patient on all of the above.  Please be advised that I do not determine MMI and will update in the assessment whether patient is improving or not, or has plateaued to the point of permanent symptoms despite successfully following recommended treatment by myself  Please be advised that the above work status is re-evaluated each visit and that the nature of the patient's injury described in the assessment and diagnoses do not have a standard or expected timeline for recovery as demonstrated in multiple up to date peer-reviewed publications  Please be advised that I do not perform FCEs. I will also not answer or comment on any FCE questions.  Please be advised I will not fill out additional paperwork that asks me to restate diagnoses, plan of care, work status, and/or accommodations as these are all documented in my clinic  note.  Please be advised I will not fill out paperwork asking me to agree with a workers compensation representative's interpretation of my plan of care and/or evaluation of the patient as I stand by what is documented in my clinic note.  Please be advised that any peer to peer requests made on the behalf of workers compensation will need to be scheduled based on my availability and will be completed by myself within 1 week of the request. My clinic is not capable of doing on demand peer to peer requests with less than 48 hours of notice.     Chandrakant Laughlin MD  Sports Neu[facility]

## 2023-11-08 NOTE — PATIENT INSTRUCTIONS
Continue lower neck/upper back to mid back PT with myofascial release (deep tissue massage, cupping, dry needling, etc). No manipulation of suboccipital region.  The patient was instructed to ice the occipital region for no more than 20 minutes at least once a day but may repeat this as many times as they would like.   Discussed ergonomic accommodations for occipital neuritis/neuralgia. Mainly perform all work at eye level to minimize continued neck flexion which will aggravate the nerve.  Patient was encouraged to do daily light cardio exercise but instructed to limit physical activities to walking, walking in water up to the waist only or riding a stationary bike, recumbent preferred. No weight lifting in upper body, no neck massage, no acupuncture of neck, and no dry needling of upper neck. No neck PT unless otherwise stated. No chiropractor work on neck. Lower body strengthening with resistant bands, leg machines, and strapping weights to legs okay. No core body workouts. No running or use of cardio equipment other than stationary bike. No swimming or body surfing. No amusement park rides. No lifting more than 5-10 lbs and bend at the knees, not the waist.  Discussed care plan in detail for post traumatic occipital neuritis including a trial of oral medications followed by series of trigger point steroid injections with occipital nerve blocks. To be referred for consultation for occipital nerve release procedure if initially clinically responsive to injections but always with a return of symptoms.  Continue vestibular PT for balance and eye movements  You do not feel your feet well due to neuropathy. Please wear footwear with good tread at all times. This recommendation includes wearing socks with tread on them. Make sure all of your walkways, hallways, and julio are well lit at all times day and night. Use night lights to light up commonly used pathways in your home, meaning from the bedroom to the bathroom  or kitchen. Make sure all of your walkways, hallways, and julio are clear of obstacles at all times. Obstacles include decorations, rugs/mats, pet beds, shoes, and clothes. Make sure you have good hand holds to grab onto as you walk around your home.  It is with a high degree of medical certainty that this patient's current signs and symptoms were caused or exacerbated by the work related injury mentioned in the note above  The patient can do light duty desk work only with the above restrictions at this time  Any delay in treatment that I am recommending for the patient's work related injury as the result of things like IMEs, FCEs, and denials in the care plan may delay the patient's recovery. Delays in recovery may cause or further worsen any underlying permanent injury that was caused by the result of the patient's injury. Delays in recovery may also cause the development of new medical conditions that will then need to be treated. The development of some forms of permanent injuries may result in nerve injuries that are refractory to initial treatment, which could result in further medical expenses as more expensive methods of treatment or prolonged treatment may be needed to treat the underlying injury. In my collective clinical experience, my care plan as documented from the initial note leads to significant recovery from injuries similar to the patient's in the majority of my other patients. Delays in recovery will prevent the patient from returning to work full time in a timely fashion. Delays in recovery caused by workers compensation for patients with similar injury have also lead to significant financial settlements for the patients should a legal dispute arise. I have counseled the patient on all of the above.  Please be advised that I do not determine MMI and will update in the assessment whether patient is improving or not, or has plateaued to the point of permanent symptoms despite successfully  following recommended treatment by myself  Please be advised that the above work status is re-evaluated each visit and that the nature of the patient's injury described in the assessment and diagnoses do not have a standard or expected timeline for recovery as demonstrated in multiple up to date peer-reviewed publications  Please be advised that I do not perform FCEs. I will also not answer or comment on any FCE questions.  Please be advised I will not fill out additional paperwork that asks me to restate diagnoses, plan of care, work status, and/or accommodations as these are all documented in my clinic note.  Please be advised I will not fill out paperwork asking me to agree with a workers compensation representative's interpretation of my plan of care and/or evaluation of the patient as I stand by what is documented in my clinic note.  Please be advised that any peer to peer requests made on the behalf of workers compensation will need to be scheduled based on my availability and will be completed by myself within 1 week of the request. My clinic is not capable of doing on demand peer to peer requests with less than 48 hours of notice.

## 2023-11-09 ENCOUNTER — CLINICAL SUPPORT (OUTPATIENT)
Dept: REHABILITATION | Facility: HOSPITAL | Age: 68
End: 2023-11-09
Payer: OTHER MISCELLANEOUS

## 2023-11-09 DIAGNOSIS — H81.90 VESTIBULOPATHY, UNSPECIFIED LATERALITY: ICD-10-CM

## 2023-11-09 DIAGNOSIS — R29.898 DECREASED ROM OF NECK: Primary | ICD-10-CM

## 2023-11-09 PROCEDURE — 97110 THERAPEUTIC EXERCISES: CPT | Mod: PN

## 2023-11-09 PROCEDURE — 97112 NEUROMUSCULAR REEDUCATION: CPT | Mod: PN

## 2023-11-09 NOTE — PROGRESS NOTES
" [facility]   Physical Therapy Treatment Note      Name: Carlin Madrigal  Clinic Number: [id number]    Therapy Diagnosis:   No diagnosis found.    Physician: Arti Roberts MD    Visit Date: 11/9/2023    Physician Orders: PT Eval and Treat   Medical Diagnosis from Referral: Acute post-traumatic headache, not intractable [G44.319], Post concussion syndrome [F07.81]  Evaluation Date: 10/11/2023  Authorization Period Expiration: 3/19/2024  Plan of Care Expiration: 11/24/2023  Progress Note Due: Last assessed 10/25/2023  Visit # / Visits authorized: 7/12  Foto: # 2/3 (completed 2nd survey 11/6)     Time In: 8:45 AM  Time Out: 9:30 AM  Total Appointment Time (timed & untimed codes): 45 minutes (2 TE + 1 NMR)     Precautions: Standard    PTA Visit #: 0/5     Subjective     Patient reports: That he is doing well overall, but is still having slight pain in R upper cervical region. He also has pain with R cervical side bending and R cervical rotation.   He was compliant with home exercise program.  Response to previous treatment: no adverse response  Functional change: improved cervical rotation    Pain: 0/10  Location: right neck and posterior head      Objective      Objective Measures updated at progress report unless specified.      Treatment     Carlin received the treatments listed below:      therapeutic exercises to develop strength, endurance, ROM, and flexibility for 30 minutes including:  FOTO Survey completion    Seated:  Upper trap stretch 2x30" bilaterally  Levator scap stretch: 2x30" bilaterally  Rotation SNAGs x5B with 10" holds  No monies with red theraband 3x15    Supine:  Chin tuck with head lift 2-3" holds x15  Chin tuck with cervical rotation x15 ea direction  Horizontal abduction with red theraband + chin tuck hold for each rep: 2x15  Serratus punches with 5# dowel 2x15 with 2-3" holds    Not today:  Standing:  Rows 2x15 with green theraband  Straight arm pulldown + chin " "tuck hold for each rep 2x15 with green theraband  Unilateral shoulder extension with contralateral cervical rotation with green theraband x15 ea direction  Standing T's with red theraband x10  Y slides on wall with lift x 10    manual therapy techniques: Soft tissue Mobilization were applied to the: suboccipital, UT, cervical paraspinals for 00 minutes, including:  Not performed today     neuromuscular re-education activities to improve: Balance, Coordination, Kinesthetic, and Posture for 15 minutes. The following activities were included:  Narrow base of support on Airex + eyes closed + head turns 2x30" each direction (horizontal and vertical)  Walking + VORx1 2x45' each direction (horizontal/vertical)     Not today:  VOR x1 3x30" (horizontal/vertical) paced at 110 bpm and standing on Airex    therapeutic activities to improve functional performance for 00  minutes, including:  Not performed today    Patient Education and Home Exercises       Education provided:   - daily HEP compliance    Written Home Exercises Provided: Patient instructed to cont prior HEP. Exercises were reviewed and Carlin was able to demonstrate them prior to the end of the session.  Carlin demonstrated good  understanding of the education provided. See Electronic Medical Record under Patient Instructions for exercises provided during therapy sessions    Assessment     Carlin tolerated treatment well today. He presented with limited cervical AROM and minimal pain with R side bending and R rotation. He was able to increase resistance and volume on his strengthening exercises today without an increase in symptoms. He demonstrated an increase in unsteadiness with horizontal VOR while walking with slight lateral deviations from linear ambulation, but no LOB or increase in dizziness afterwards.     Carlin Is progressing well towards his goals.   Patient prognosis is Good.     Patient will continue to benefit from skilled outpatient physical " therapy to address the deficits listed in the problem list box on initial evaluation, provide pt/family education and to maximize pt's level of independence in the home and community environment.     Patient's spiritual, cultural and educational needs considered and pt agreeable to plan of care and goals.     Anticipated barriers to physical therapy: chronicity of current injury    Goals:   Short Term Goals: 3 weeks  Patient will be 100% compliant with HEP in order to maximize PT benefits (met)  Patient will improve cervical AROM in plane of rotation by >/= 5 degrees in order to improve functional mobility for activities such as driving (met)  Patient will complete >/=30 seconds of VOR activity at >/=120 bpm with no increase in baseline dizziness in order to improve vestibular function for fixation tasks (progressing, not met)  Patient will note neck/suboccipital pain at worst as </=3/10 over 3 week period in order to improve general activity tolerance and quality of life (progressing, not met)     Long Term Goals: 6 weeks   Patient will score >/= 73% on FOTO survey in order to improve self-perception of functional mobility deficits (progressing, not met)  Patient will improve cervical AROM in plane of rotation by >/= 10 degrees in order to improve functional mobility for activities such as driving (progressing, not met)  Patient will complete >/=30 seconds of VOR activity at >/=150 bpm with no increase in baseline dizziness in order to improve vestibular function for fixation tasks (progressing, not met)  Patient will complete all components of m-CTSIB with </=moderate postural sway in order to improve balance in dim/dark environments (progressing, not met)  Patient will note neck/suboccipital pain at worst as </=1/10 over 3 week period in order to improve general activity tolerance and quality of life (progressing, not met)    Plan     Plan to cont with progression of PT goals per POC.    Antwon Goins, SPT

## 2023-11-15 ENCOUNTER — CLINICAL SUPPORT (OUTPATIENT)
Dept: REHABILITATION | Facility: HOSPITAL | Age: 68
End: 2023-11-15
Payer: OTHER MISCELLANEOUS

## 2023-11-15 DIAGNOSIS — R29.898 DECREASED ROM OF NECK: Primary | ICD-10-CM

## 2023-11-15 DIAGNOSIS — H81.90 VESTIBULOPATHY, UNSPECIFIED LATERALITY: ICD-10-CM

## 2023-11-15 PROCEDURE — 97110 THERAPEUTIC EXERCISES: CPT | Mod: PN

## 2023-11-15 NOTE — PROGRESS NOTES
"  [facility]   Physical Therapy Treatment Note      Name: Carlin Madrigal  Clinic Number: [id number]    Therapy Diagnosis:   Encounter Diagnoses   Name Primary?    Decreased ROM of neck Yes    Vestibulopathy, unspecified laterality        Physician: Arti Roberts MD    Visit Date: 11/15/2023    Physician Orders: PT Eval and Treat   Medical Diagnosis from Referral: Acute post-traumatic headache, not intractable [G44.319], Post concussion syndrome [F07.81]  Evaluation Date: 10/11/2023  Authorization Period Expiration: 3/19/2024  Plan of Care Expiration: 11/24/2023  Progress Note Due: Last assessed 10/25/2023  Visit # / Visits authorized: 8/12  Foto: # 2/3 (completed 2nd survey 11/6)     Time In: 9:30 AM  Time Out: 10:20 AM  Total Appointment Time (timed & untimed codes): 50 minutes (3 TE)     Precautions: Standard    PTA Visit #: 0/5     Subjective     Patient reports: he is doing really well overall, but still has some minor pain in R upper cervical region that hasn't gotten any better.  He was compliant with home exercise program.  Response to previous treatment: no adverse response  Functional change: improved cervical rotation    Pain: 0/10  Location: right neck and posterior head      Objective      Objective Measures updated at progress report unless specified.      Treatment     Carlin received the treatments listed below:      therapeutic exercises to develop strength, endurance, ROM, and flexibility for 50 minutes including:  FOTO Survey completion    Seated:  Upper trap stretch 2x30" bilaterally  Levator scap stretch: 2x30" bilaterally  Rotation SNAGs x15B with 10" holds  No monies with red theraband 3x15    Supine:  Chin tuck with head lift 2-3" holds x15  Chin tuck with cervical rotation x15 ea direction    Standing:  Serratus Press: 2x15 GTB  Rows 2x15 with green theraband    Not today:  Supine:  Horizontal abduction with red theraband + chin tuck hold for each rep: " "2x15  Serratus punches with 5# dowel 2x15 with 2-3" holds  Standing:  Straight arm pulldown + chin tuck hold for each rep 2x15 with green theraband  Unilateral shoulder extension with contralateral cervical rotation with green theraband x15 ea direction  Standing T's with red theraband x10  Y slides on wall with lift x 10    manual therapy techniques: Soft tissue Mobilization were applied to the: suboccipital, UT, cervical paraspinals for 00 minutes, including:  Not performed today     neuromuscular re-education activities to improve: Balance, Coordination, Kinesthetic, and Posture for 00 minutes. The following activities were included:    Not today:  Narrow base of support on Airex + eyes closed + head turns 2x30" each direction (horizontal and vertical)  Walking + VORx1 2x45' each direction (horizontal/vertical)   VOR x1 3x30" (horizontal/vertical) paced at 110 bpm and standing on Airex  VOR x1 3x30" (horizontal/vertical) paced at 110 bpm and standing on Airex    therapeutic activities to improve functional performance for 00  minutes, including:  Not performed today    Patient Education and Home Exercises       Education provided:   - daily HEP compliance    Written Home Exercises Provided: Patient instructed to cont prior HEP. Exercises were reviewed and Carlin was able to demonstrate them prior to the end of the session.  Carlin demonstrated good  understanding of the education provided. See Electronic Medical Record under Patient Instructions for exercises provided during therapy sessions    Assessment     Carlin tolerated treatment well today. He presented with improved cervical AROM flexion and extension, but movement towards the R is limited by R suboccipital/upper cervical pain. He was unable to perform standing horizontal abductions because of pain in the R anterior deltoid, which has been a chronic issue. Next visit prioritize balance and VOR exercises.     Carlin Is progressing well towards his " goals.   Patient prognosis is Good.     Patient will continue to benefit from skilled outpatient physical therapy to address the deficits listed in the problem list box on initial evaluation, provide pt/family education and to maximize pt's level of independence in the home and community environment.     Patient's spiritual, cultural and educational needs considered and pt agreeable to plan of care and goals.     Anticipated barriers to physical therapy: chronicity of current injury    Goals:   Short Term Goals: 3 weeks  Patient will be 100% compliant with HEP in order to maximize PT benefits (met)  Patient will improve cervical AROM in plane of rotation by >/= 5 degrees in order to improve functional mobility for activities such as driving (met)  Patient will complete >/=30 seconds of VOR activity at >/=120 bpm with no increase in baseline dizziness in order to improve vestibular function for fixation tasks (progressing, not met)  Patient will note neck/suboccipital pain at worst as </=3/10 over 3 week period in order to improve general activity tolerance and quality of life (progressing, not met)     Long Term Goals: 6 weeks   Patient will score >/= 73% on FOTO survey in order to improve self-perception of functional mobility deficits (progressing, not met)  Patient will improve cervical AROM in plane of rotation by >/= 10 degrees in order to improve functional mobility for activities such as driving (progressing, not met)  Patient will complete >/=30 seconds of VOR activity at >/=150 bpm with no increase in baseline dizziness in order to improve vestibular function for fixation tasks (progressing, not met)  Patient will complete all components of m-CTSIB with </=moderate postural sway in order to improve balance in dim/dark environments (progressing, not met)  Patient will note neck/suboccipital pain at worst as </=1/10 over 3 week period in order to improve general activity tolerance and quality of life  (progressing, not met)    Plan     Plan to cont with progression of PT goals per POC.    Antwon Goins, SPT

## 2023-11-17 ENCOUNTER — CLINICAL SUPPORT (OUTPATIENT)
Dept: REHABILITATION | Facility: HOSPITAL | Age: 68
End: 2023-11-17
Payer: OTHER MISCELLANEOUS

## 2023-11-17 DIAGNOSIS — H81.90 VESTIBULOPATHY, UNSPECIFIED LATERALITY: ICD-10-CM

## 2023-11-17 DIAGNOSIS — R29.898 DECREASED ROM OF NECK: Primary | ICD-10-CM

## 2023-11-17 PROCEDURE — 97110 THERAPEUTIC EXERCISES: CPT | Mod: PN,CQ

## 2023-11-17 PROCEDURE — 97112 NEUROMUSCULAR REEDUCATION: CPT | Mod: PN,CQ

## 2023-11-17 NOTE — PROGRESS NOTES
"  [facility]   Physical Therapy Treatment Note      Name: Carlin Madrigal  Clinic Number: [id number]    Therapy Diagnosis:   Encounter Diagnoses   Name Primary?    Decreased ROM of neck Yes    Vestibulopathy, unspecified laterality        Physician: Arti Roberts MD    Visit Date: 11/17/2023    Physician Orders: PT Eval and Treat   Medical Diagnosis from Referral: Acute post-traumatic headache, not intractable [G44.319], Post concussion syndrome [F07.81]  Evaluation Date: 10/11/2023  Authorization Period Expiration: 3/19/2024  Plan of Care Expiration: 11/24/2023  Progress Note Due: Last assessed 10/25/2023  Visit # / Visits authorized: 9/12  Foto: # 2/3 (completed 2nd survey 11/6)     Time In: 8:45 AM  Time Out: 9:30 AM  Total Appointment Time (timed & untimed codes): 45 minutes (2 TE + 1 NMR)     Precautions: Standard    PTA Visit #: 1/5     Subjective     Patient reports: no complaints of pain other than R anterior shoulder which he says has always been a nagging issue.  He was compliant with home exercise program.  Response to previous treatment: no adverse response  Functional change: improved cervical rotation    Pain: 1/10  Location: right anterior shoulder    Objective      Objective Measures updated at progress report unless specified.      Treatment     Carlin received the treatments listed below:      therapeutic exercises to develop strength, endurance, ROM, and flexibility for 25 minutes including:    Supine:  Chin tuck with head lift 2-3" holds x15  Chin tuck with cervical rotation x15 ea direction  Horizontal abduction with red theraband + chin tuck hold for each rep: 2x15    Standing:  Serratus Press: 2x15 GTB  Rows 2x15 with green theraband  Straight arm pulldown + chin tuck hold for each rep 2x15 with green theraband  Unilateral shoulder extension with contralateral cervical rotation with green theraband x15 ea direction  Y slides on wall with lift x 10    Not " "today:    Seated:  Upper trap stretch 2x30" bilaterally  Levator scap stretch: 2x30" bilaterally  Rotation SNAGs x15B with 10" holds  No monies with red theraband 3x15    Supine:  Serratus punches with 5# dowel 2x15 with 2-3" holds    Standing:  Standing T's with red theraband x10      manual therapy techniques: Soft tissue Mobilization were applied to the: suboccipital, UT, cervical paraspinals for 00 minutes, including:  Not performed today     neuromuscular re-education activities to improve: Balance, Coordination, Kinesthetic, and Posture for 20 minutes. The following activities were included:  Narrow base of support on Airex + eyes closed + head turns 2x30" each direction (horizontal and vertical)  Walking + VORx1 2x45' each direction (horizontal/vertical)   VOR x1 3x30" (horizontal/vertical) paced at 110 bpm and standing on Airex  VOR x1 3x30" (horizontal/vertical) paced at 110 bpm and standing on Airex    therapeutic activities to improve functional performance for 00  minutes, including:  Not performed today    Patient Education and Home Exercises       Education provided:   - daily HEP compliance    Written Home Exercises Provided: Patient instructed to cont prior HEP. Exercises were reviewed and Carlin was able to demonstrate them prior to the end of the session.  Carlin demonstrated good  understanding of the education provided. See Electronic Medical Record under Patient Instructions for exercises provided during therapy sessions    Assessment     Carlin arrived to session with complaints of mild R anterior shoulder pain but was agreeable to treatment.  He denied any right sided suboccipital/cervical pain this session.  Overall good tolerance of treatment continuing to focus on improving cervical mobility and postural strengthening.  Resumed balance activity with a focus on VOR and sensory integration completed today without adverse response.  He is still challenged with compliant surfaces with " increased postural sway observed though no loss of balance.  He would benefit from continued PT services to achieve remaining functional goals before discharge.       Carlin Is progressing well towards his goals.   Patient prognosis is Good.     Patient will continue to benefit from skilled outpatient physical therapy to address the deficits listed in the problem list box on initial evaluation, provide pt/family education and to maximize pt's level of independence in the home and community environment.     Patient's spiritual, cultural and educational needs considered and pt agreeable to plan of care and goals.     Anticipated barriers to physical therapy: chronicity of current injury    Goals:   Short Term Goals: 3 weeks  Patient will be 100% compliant with HEP in order to maximize PT benefits (met)  Patient will improve cervical AROM in plane of rotation by >/= 5 degrees in order to improve functional mobility for activities such as driving (met)  Patient will complete >/=30 seconds of VOR activity at >/=120 bpm with no increase in baseline dizziness in order to improve vestibular function for fixation tasks (progressing, not met)  Patient will note neck/suboccipital pain at worst as </=3/10 over 3 week period in order to improve general activity tolerance and quality of life (progressing, not met)     Long Term Goals: 6 weeks   Patient will score >/= 73% on FOTO survey in order to improve self-perception of functional mobility deficits (progressing, not met)  Patient will improve cervical AROM in plane of rotation by >/= 10 degrees in order to improve functional mobility for activities such as driving (progressing, not met)  Patient will complete >/=30 seconds of VOR activity at >/=150 bpm with no increase in baseline dizziness in order to improve vestibular function for fixation tasks (progressing, not met)  Patient will complete all components of m-CTSIB with </=moderate postural sway in order to improve  balance in dim/dark environments (progressing, not met)  Patient will note neck/suboccipital pain at worst as </=1/10 over 3 week period in order to improve general activity tolerance and quality of life (progressing, not met)    Plan     Plan to cont with progression of PT goals per POC.    Rowena Jade, PTA

## 2023-11-20 ENCOUNTER — CLINICAL SUPPORT (OUTPATIENT)
Dept: REHABILITATION | Facility: HOSPITAL | Age: 68
End: 2023-11-20
Payer: OTHER MISCELLANEOUS

## 2023-11-20 DIAGNOSIS — R29.898 DECREASED ROM OF NECK: Primary | ICD-10-CM

## 2023-11-20 DIAGNOSIS — H81.90 VESTIBULOPATHY, UNSPECIFIED LATERALITY: ICD-10-CM

## 2023-11-20 PROCEDURE — 97110 THERAPEUTIC EXERCISES: CPT | Mod: PN

## 2023-11-20 PROCEDURE — 97112 NEUROMUSCULAR REEDUCATION: CPT | Mod: PN

## 2023-11-20 NOTE — PROGRESS NOTES
" [facility]   Physical Therapy Treatment Note      Name: Carlin Madrigal  Clinic Number: [id number]    Therapy Diagnosis:   Encounter Diagnoses   Name Primary?    Decreased ROM of neck Yes    Vestibulopathy, unspecified laterality      Physician: Arti Roberts MD    Visit Date: 11/20/2023    Physician Orders: PT Eval and Treat   Medical Diagnosis from Referral: Acute post-traumatic headache, not intractable [G44.319], Post concussion syndrome [F07.81]  Evaluation Date: 10/11/2023  Authorization Period Expiration: 3/19/2024  Plan of Care Expiration: 11/24/2023  Progress Note Due: Last assessed 10/25/2023  Visit # / Visits authorized: 10/12  Foto: # 2/3 (completed 2nd survey 11/6)     Time In: 8:35 AM  Time Out: 9:29 AM  Total Appointment Time (timed & untimed codes): 54 minutes (2 TE + 2 NMR)     Precautions: Standard    PTA Visit #: 0/5     Subjective     Patient reports: Mild status quo right shoulder discomfort but no suboccipital tenderness at rest today - only when he rotates neck to the right.  He was compliant with home exercise program.  Response to previous treatment: no adverse response  Functional change: improved cervical rotation    Pain: 1/10  Location: right anterior shoulder    Objective      Objective Measures updated at progress report unless specified.     RANGE OF MOTION:  CERVICAL SPINE AROM: updated values in parentheses   Flexion: (norm: 80-90 deg) 70 deg (85)   Extension: (norm: 70-80 deg) 49 deg (50)   Left Sidebend: (norm: 20-45 deg) 27 deg (25)   Right Sidebend: (norm: 20-45 deg) 25 deg (25)   Left Rotation: (norm: 70-90 deg) 65 deg (72)   Right Rotation: (norm: 70-90 deg) 59 deg (70)      Treatment     Carlin received the treatments listed below:      therapeutic exercises to develop strength, endurance, ROM, and flexibility for 30 minutes including:    Seated:  Upper trap stretch 2x30" bilaterally  Levator scap stretch: 2x30" bilaterally  Rotation SNAGs x5B " "with 10" holds  No monies with red theraband 3x15    Supine:  Chin tuck with head lift 2-3" holds x15  Chin tuck with cervical rotation x15 ea direction  Serratus punches with 5# dowel 2x15 with 2-3" holds    Standing:  Rows 2x15 with green theraband    Not today:    Standing:  Standing T's with red theraband x10  Serratus Press: 2x15 GTB  Rows 2x15 with green theraband  Straight arm pulldown + chin tuck hold for each rep 2x15 with green theraband  Unilateral shoulder extension with contralateral cervical rotation with green theraband x15 ea direction  Y slides on wall with lift x 10    Supine:  Horizontal abduction with red theraband + chin tuck hold for each rep: 2x15    manual therapy techniques: Soft tissue Mobilization were applied to the: suboccipital, UT, cervical paraspinals for 00 minutes, including:  Not performed today     neuromuscular re-education activities to improve: Balance, Coordination, Kinesthetic, and Posture for 24 minutes. The following activities were included:  Narrow base of support on Airex + eyes closed + head turns 2x30" each direction (horizontal and vertical)  Walking + VORx1 4x35' diagonal  VOR x1 2x30" (horizontal/vertical) paced at 150 bpm and standing on Airex  VOR x1 2x30" (horizontal/vertical) paced at 150 bpm and standing on Airex    therapeutic activities to improve functional performance for 00  minutes, including:  Not performed today    Patient Education and Home Exercises       Education provided:   - daily HEP compliance    Written Home Exercises Provided: Patient instructed to cont prior HEP. Exercises were reviewed and Carlin was able to demonstrate them prior to the end of the session.  Carlin demonstrated good  understanding of the education provided. See Electronic Medical Record under Patient Instructions for exercises provided during therapy sessions    Assessment     Cariln tolerated session within appropriate fatigue parameters and no increase in symptoms from " baseline. Improving cervical rotation noted during brief reassessment and improving deep cervical flexor activation noted during pertinent exercise. No visual slippage with progressed VOR activity. He is likely approaching maximal therapeutic benefit considering minimal cervical symptoms with activities of daily living and minimal cuing needed for exercise at this point. He would benefit from discharge reassessment and updated home exercise program next visit.    Carlin Is progressing well towards his goals.   Patient prognosis is Good.     Patient will continue to benefit from skilled outpatient physical therapy to address the deficits listed in the problem list box on initial evaluation, provide pt/family education and to maximize pt's level of independence in the home and community environment.     Patient's spiritual, cultural and educational needs considered and pt agreeable to plan of care and goals.     Anticipated barriers to physical therapy: chronicity of current injury    Goals:   Short Term Goals: 3 weeks  Patient will be 100% compliant with HEP in order to maximize PT benefits (met)  Patient will improve cervical AROM in plane of rotation by >/= 5 degrees in order to improve functional mobility for activities such as driving (met)  Patient will complete >/=30 seconds of VOR activity at >/=120 bpm with no increase in baseline dizziness in order to improve vestibular function for fixation tasks (met)  Patient will note neck/suboccipital pain at worst as </=3/10 over 3 week period in order to improve general activity tolerance and quality of life (met)     Long Term Goals: 6 weeks   Patient will score >/= 73% on FOTO survey in order to improve self-perception of functional mobility deficits (progressing, not met)  Patient will improve cervical AROM in plane of rotation by >/= 10 degrees in order to improve functional mobility for activities such as driving (met for right rotation and progressing for left  rotation)  Patient will complete >/=30 seconds of VOR activity at >/=150 bpm with no increase in baseline dizziness in order to improve vestibular function for fixation tasks (met)  Patient will complete all components of m-CTSIB with </=moderate postural sway in order to improve balance in dim/dark environments (progressing, not met)  Patient will note neck/suboccipital pain at worst as </=1/10 over 3 week period in order to improve general activity tolerance and quality of life (met)    Plan     Discharge likely next. Perform mCTSIB, FOTO and update HEP    JONNY DEMPSEY, COLETTE

## 2023-11-22 ENCOUNTER — CLINICAL SUPPORT (OUTPATIENT)
Dept: REHABILITATION | Facility: HOSPITAL | Age: 68
End: 2023-11-22
Payer: OTHER MISCELLANEOUS

## 2023-11-22 DIAGNOSIS — R29.898 DECREASED ROM OF NECK: Primary | ICD-10-CM

## 2023-11-22 DIAGNOSIS — H81.90 VESTIBULOPATHY, UNSPECIFIED LATERALITY: ICD-10-CM

## 2023-11-22 PROCEDURE — 97110 THERAPEUTIC EXERCISES: CPT | Mod: PN

## 2023-11-22 PROCEDURE — 97112 NEUROMUSCULAR REEDUCATION: CPT | Mod: PN

## 2023-11-22 NOTE — PROGRESS NOTES
[facility]   Physical Therapy Treatment Note      Name: Carlin Madrigal  Clinic Number: [id number]    Therapy Diagnosis:   Encounter Diagnoses   Name Primary?    Decreased ROM of neck Yes    Vestibulopathy, unspecified laterality      Physician: Arti Roberts MD    Visit Date: 11/22/2023    Physician Orders: PT Eval and Treat   Medical Diagnosis from Referral: Acute post-traumatic headache, not intractable [G44.319], Post concussion syndrome [F07.81]  Evaluation Date: 10/11/2023  Authorization Period Expiration: 3/19/2024  Plan of Care Expiration: 11/24/2023  Progress Note Due: Last assessed 10/25/2023  Visit # / Visits authorized: 11/12  Foto: Discharge FOTO completed    Time In: 8:49 AM  Time Out: 9:24 AM  Total Appointment Time (timed & untimed codes): 35 minutes (2 TE + 1 NMR)     Precautions: Standard    PTA Visit #: 0/5     Subjective     Patient reports: Agreeable to discharge today  He was compliant with home exercise program.  Response to previous treatment: no adverse response  Functional change: improved cervical rotation; almost all goals achieved    Pain: 0/10  Location: right anterior shoulder    Objective      Objective Measures updated at progress report unless specified.     RANGE OF MOTION:  CERVICAL SPINE AROM: updated values in parentheses - taken on 11/20/2023  Flexion: (norm: 80-90 deg) 70 deg (85)   Extension: (norm: 70-80 deg) 49 deg (50)   Left Sidebend: (norm: 20-45 deg) 27 deg (25)   Right Sidebend: (norm: 20-45 deg) 25 deg (25)   Left Rotation: (norm: 70-90 deg) 65 deg (72)   Right Rotation: (norm: 70-90 deg) 59 deg (70)      m-CTSIB (each position held 30 seconds for pass)  - Condition 1 (eyes open, solid surface): P no sway  - Condition 2 (eyes closed, solid surface): P min sway  - Condition 3 (eyes open, foam surface): P min sway  - Condition 4 (eyes closed, foam surface): P mod sway    FOTO Med Neuro: 78%    Treatment     Carlin received the treatments  "listed below:      therapeutic exercises to develop strength, endurance, ROM, and flexibility for 27 minutes including:    Seated:  Upper trap stretch 2x30" bilaterally  Levator scap stretch: 2x30" bilaterally  Rotation SNAGs x5B with 10" holds  No monies with red theraband 2x15    Supine:  Chin tuck with cervical rotation x10 ea direction    Standing:  Rows 2x15 with green theraband    neuromuscular re-education activities to improve: Balance, Coordination, Kinesthetic, and Posture for 8 minutes. The following activities were included:  M-CTSIB and FOTO reassessments (see above)    Patient Education and Home Exercises       Education provided:   - daily HEP compliance    Written Home Exercises Provided: Patient instructed to cont prior HEP. Exercises were reviewed and Carlin was able to demonstrate them prior to the end of the session.  Carlin demonstrated good  understanding of the education provided. See Electronic Medical Record under Patient Instructions for exercises provided during therapy sessions    Assessment     Final PT session consisted of HEP review and final goal reassessment. Patient has achieved majority of goals indicating improved cervical range of motion, improved self perception of functional mobility, improved sensory integration/balance, and improved vestibular function. Due to degree of goal achievement coupled with good activity tolerance/sustained low pain levels, he is appropriate for discharge from skilled physical therapy services at this time. He understands he can contact PT with any questions/concerns post-discharge.    Carlin Is progressing well towards his goals.   Patient prognosis is Good.     Patient's spiritual, cultural and educational needs considered and pt agreeable to plan of care and goals.     Anticipated barriers to physical therapy: chronicity of current injury    Goals:   Short Term Goals: 3 weeks  Patient will be 100% compliant with HEP in order to maximize PT " benefits (met)  Patient will improve cervical AROM in plane of rotation by >/= 5 degrees in order to improve functional mobility for activities such as driving (met)  Patient will complete >/=30 seconds of VOR activity at >/=120 bpm with no increase in baseline dizziness in order to improve vestibular function for fixation tasks (met)  Patient will note neck/suboccipital pain at worst as </=3/10 over 3 week period in order to improve general activity tolerance and quality of life (met)     Long Term Goals: 6 weeks   Patient will score >/= 73% on FOTO survey in order to improve self-perception of functional mobility deficits (met)  Patient will improve cervical AROM in plane of rotation by >/= 10 degrees in order to improve functional mobility for activities such as driving (met for right rotation and progressing for left rotation)  Patient will complete >/=30 seconds of VOR activity at >/=150 bpm with no increase in baseline dizziness in order to improve vestibular function for fixation tasks (met)  Patient will complete all components of m-CTSIB with </=moderate postural sway in order to improve balance in dim/dark environments (met)  Patient will note neck/suboccipital pain at worst as </=1/10 over 3 week period in order to improve general activity tolerance and quality of life (met)    Plan     Discharge PT    JONNY DEMPSEY, PT

## 2023-12-11 PROBLEM — S06.6X9A SUBARACHNOID HEMORRHAGE FOLLOWING INJURY, WITH LOSS OF CONSCIOUSNESS: Status: RESOLVED | Noted: 2023-09-11 | Resolved: 2023-12-11

## 2023-12-28 ENCOUNTER — TELEPHONE (OUTPATIENT)
Dept: NEUROLOGY | Facility: CLINIC | Age: 68
End: 2023-12-28
Payer: OTHER MISCELLANEOUS

## 2023-12-28 NOTE — TELEPHONE ENCOUNTER
Pt called to reschedule follow up appointment. Offered next dates available January 11 at [facility] or January 24th at [facility]. Pt accepted January 11 at 10:20 AM. Reiterated date, time, and location of appointment.

## 2024-01-11 ENCOUNTER — OFFICE VISIT (OUTPATIENT)
Dept: NEUROLOGY | Facility: CLINIC | Age: 69
End: 2024-01-11
Payer: OTHER MISCELLANEOUS

## 2024-01-11 VITALS — SYSTOLIC BLOOD PRESSURE: 160 MMHG | HEART RATE: 85 BPM | DIASTOLIC BLOOD PRESSURE: 74 MMHG

## 2024-01-11 DIAGNOSIS — S13.4XXD WHIPLASH INJURY TO NECK, SUBSEQUENT ENCOUNTER: ICD-10-CM

## 2024-01-11 DIAGNOSIS — G62.9 NEUROPATHY: ICD-10-CM

## 2024-01-11 DIAGNOSIS — S06.6X9S SUBARACHNOID HEMORRHAGE FOLLOWING INJURY, WITH LOSS OF CONSCIOUSNESS, SEQUELA: Primary | ICD-10-CM

## 2024-01-11 DIAGNOSIS — S06.362D: ICD-10-CM

## 2024-01-11 DIAGNOSIS — R20.2 PARESTHESIAS: ICD-10-CM

## 2024-01-11 DIAGNOSIS — S06.0X9S CONCUSSION WITH LOSS OF CONSCIOUSNESS, SEQUELA: ICD-10-CM

## 2024-01-11 DIAGNOSIS — M25.511 RIGHT SHOULDER PAIN, UNSPECIFIED CHRONICITY: ICD-10-CM

## 2024-01-11 PROCEDURE — 99999 PR PBB SHADOW E&M-EST. PATIENT-LVL IV: CPT | Mod: PBBFAC,,, | Performed by: PSYCHIATRY & NEUROLOGY

## 2024-01-11 PROCEDURE — 99214 OFFICE O/P EST MOD 30 MIN: CPT | Mod: S$GLB,,, | Performed by: PSYCHIATRY & NEUROLOGY

## 2024-01-11 RX ORDER — GABAPENTIN 300 MG/1
300 CAPSULE ORAL 3 TIMES DAILY
Qty: 90 CAPSULE | Refills: 5 | Status: SHIPPED | OUTPATIENT
Start: 2024-01-11

## 2024-01-11 NOTE — PROGRESS NOTES
Chief Complaint: Headache    Subjective:     History of Present Illness    Referring Provider: ED  Date of Injury: 6/13/23  Accompanied by: No one  Workers Comp     09/11/2023: Carlin Madrigal is a 68 y.o. male with h/o DM, HTN, HLD who presents for concussion evaluation. 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 Currently, headaches are right occipital, can last all day, 2-3 times a week, pounding. Per daughter, he has described head as bloated. He has a scar on right side of neck from the above injury. He has lower neck pain that is new since above injury. He has associated phonophobia per daughter, weakness in legs, imbalance that is new since above injury, spinning that was present prior to injury that is worse since above injury. He denies photophobia, numbness, tingling, lightheadedness, N/V. He has been having dry mouth. He notes fluctuating vision from diabetes and denies vision changes since above injury. He denies changes in taste, smell, hearing, other vision changes, appetite. He denies tinnitus. Per daughter, he gets bilateral jaw pain for the last 3 weeks that is sharp that is new since above injury and has looked swollen when eating before. He denies cognitive fogginess, concentration difficulties, and memory changes. He is sleeping well and wakes up rested. He has daytime fatigue. He snores and denies it worsening since above injury and denies apnea. He denies positional component but headache worsens when lays on right side and vasal vagal maneuvers do not significantly worsen headaches. He denies headaches waking him up and does  not wake up with headaches. Mood is okay and per daughter he is easily irritable since the above injury that he agrees with. He denies emotional lability. He has only tried Tylenol. He was just prescribed PT to help with headaches. He denies other prior head and neck injuries. Prior to current injury, headaches would be once every 2 weeks from being out in the sun and no associated photophobia, phonophobia, weakness, numbness, tingling, N/V, change in vision, aura and would not stop ADLs. Glucose most recently 124 and has been 160s and 180s.     Per ED note dated 6/13/23, per daughter he passed out at work today per coworkers and upon awakening could not remember where he was or what happened, he reports becoming dizzy but does not remember passing out     Per ED note dated 5/25/23, he has had 1 syncopal episode, has had intermittent blurred vision and dizziness described as lightheadedness that started 1 week ago, 1 week of right shoulder pain, and neck pain that began 2 weeks ago and denies trauma or injuries to the area and was sudden onset.     09/27/2023: Carlin Madrigal is a 68 y.o. male  with h/o DM, HTN, HLD, SAH and concussion with LOC, kinetic force injury with resultant cranio cervical trauma and occipital neuritis s/p medrol dose pack x1 who presents for follow up. On last visit, patient was prescribed a Medrol dose pack and started on ice therapy, ergonomics, and exercise restrictions and referred for vestibular PT and not to start neck PT and counseled on neuropathy precautions. In the interim, stopped Medrol as glucose went > 200. Offered patient translational services if needed and he declines at this time and let him know it is a free service should he choose to need it during the visit. He feels better compared to when injury first happened. Headaches are 1-3 times a week per a calendar he shows me, right occipital, cannot describe pain quality, lasts whole day but not into next day. He denies neck  pain. He describes generalized weakness. He denies photophobia, phonophobia, numbness, tingling, dizziness, N/V. He thinks he has difficulties seeing from diabetes and denies changes from his injury. He is sleeping well and wakes up feeling well. Mood is okay. He stopped taking Medrol on day 3 of the pack as his glucose was going > 200 and denies side effects. He is icing. He has not heard about vestibular PT. He is not doing neck PT. He is following neuropathy precautions.      11/08/2023: Carlin Madrigal is a 68 y.o. male with h/o DM, HTN, HLD, SAH and concussion with LOC, kinetic force injury with resultant cranio cervical trauma and occipital neuritis s/p medrol dose pack x1 who presents for follow up. On last visit, patient was referred for lower neck/upper back to mid back PT with myofascial release and continued on ice therapy, ergonomics, and exercise restrictions and referred for vestibular PT and counseled on neuropathy precautions. Offered patient translational services if needed and he declines at this time and let him know it is a free service should he choose to need it during the visit. He states that his head does not hurt but describes pins and needles sensation in right occipital region that can happen multiple times a day or not at all and feels it when turns head to the right and lasts 3-4 minutes. He denies neck pain. He denies photophobia, phonophobia, numbness, tingling, N/V, dizziness. He describes difficulties with far vision that he thinks may be from his diabetes. He describes generalized weakness and describes will get cramp in right posterior leg when tries to lift something. He is sleeping well and wakes up rested. Mood is good. He is doing neck PT and is helping and is doing vestibular PT that is helping but still feels imbalance.  He is following neuropathy precautions. He is not icing. He describes bilateral jaw pain and states jaw will get swollen when tries to chew. He states he has  upcoming appointment with dentist. He has new feeling that smells are staying in his nose.    01/11/2024: Carlin Madrigal is a 68 y.o. male with h/o DM, HTN, HLD, SAH and concussion with LOC, kinetic force injury with resultant cranio cervical trauma and occipital neuritis s/p medrol dose pack x1 who presents for follow up. On last visit, patient was continued on lower neck/upper back to mid back PT with myofascial release, ice therapy, ergonomics, and exercise restrictions and vestibular PT and counseled on neuropathy precautions. Offered patient translational services if needed and he declines at this time and let him know it is a free service should he choose to need it during the visit. He states he is doing well. He has slight pain in right base of skull, near daily for this week, 10 mins, triggered when turns head to the right, bothersome pain is best pain description, denies other headaches. He denies neck pain. He has right shoulder pain and sometimes has right hand weakness as a result. He states someone is trying to get him a shoulder specialist but he has not heard from one and is agreeable to a referral from me.  He denies photophobia, phonophobia, numbness, tingling, dizziness, N/V, change in vision. He is sleeping well and right lateral shoulder pain will wake him up and wakes up ready to go. Mood is good. He finished neck PT and does home exercises and PT helped. He is not needing to ice. He does vestibular PT exercises at home as he finished it and helped him. He is following neuropathy precautions. He denies side effects to gabapentin he is on and is taking 300 mg QAM and QPM only.    Current Outpatient Medications on File Prior to Visit   Medication Sig Dispense Refill    diclofenac sodium (VOLTAREN) 1 % Gel Apply 2 g topically 3 (three) times daily. 100 g 0    lisinopriL (PRINIVIL,ZESTRIL) 40 MG tablet Take 40 mg by mouth Daily.      metFORMIN (GLUCOPHAGE) 1000 MG tablet Take 1,000 mg by mouth 2  (two) times a day.      NOVOLIN 70/30 U-100 INSULIN 100 unit/mL (70-30) injection Inject 15 Units into the skin 2 (two) times a day.      pravastatin (PRAVACHOL) 20 MG tablet Take 20 mg by mouth Daily.      [DISCONTINUED] gabapentin (NEURONTIN) 300 MG capsule Take 300 mg by mouth 3 (three) times daily.      amLODIPine (NORVASC) 5 MG tablet Take 1 tablet (5 mg total) by mouth once daily. 30 tablet 1     No current facility-administered medications on file prior to visit.       Review of patient's allergies indicates:  No Known Allergies    History reviewed. No pertinent family history.    Social History     Tobacco Use    Smoking status: Former     Types: Cigarettes     Passive exposure: Never    Smokeless tobacco: Never   Substance Use Topics    Alcohol use: Not Currently     Comment: 06/13/23: occ    Drug use: Not Currently       Review of Systems  Constitutional: No fevers, no chills, no change in weight  Eye/Vision: See HPI  Ear/Nose/Mouth/Throat: See HPI; no cough, no runny nose, no sore throat  Respiratory: No shortness of breath, no problems breathing  Cardiovascular: No chest pain  Gastrointestinal: See HPI, no diarrhea, no constipation  Genitourinary: No dysuria  Musculoskeletal: See HPI  Integumentary: No skin changes  Neurologic: See HPI  Psychiatric: Denies depression, denies anxiety, denies SI and HI.  Additional System Information:    Objective:     Vitals:    01/11/24 1021   BP: (!) 160/74   Pulse: 85       General: Alert and awake, Well nourished, Well groomed, No acute distress, no photophobia with 60 Hz hypersensitivity.  Eyes: Extraocular movements are intact; Normal conjunctiva; no nystagmus; Visual fields are intact bilaterally to finger counting in all cardinal directions  Neck: Supple  No Stiffness  Patient has no occipital point tenderness over the bilateral greater and lesser occipital nerve without induction of headaches with no jump sign and no twitch response and no referred pain: 0+   No  "high, medial cervical pain with lateral movement of C1 over C2 and with isometric neck flexion and extension  Fluid patient turnaround with concurrent neck movement in direction of torso movement.  No bilateral paraspinal cervical muscle spasm present  Spine/torso exam: Spine/ torso exam is within normal limits     Neurologic Exam  no saccadic intrusions of volitional ocular smooth pursuits  no pain with sustained upgaze and convergence  no visual motion sensitivity/dizziness produced with rapid eye movements or neck movements  no convergence insufficiency with no diplopia developed > 5 " accommodation    Sensory: Negative Romberg, unsteady on tandem stance    Gait: Gait WNL, Heel to toe walking WNL    Labs:    No new labs    Imaging:    No new studies    Assessment:       ICD-10-CM ICD-9-CM    1. Subarachnoid hemorrhage following injury, with loss of consciousness, sequela  S06.6X9S 907.0       2. Concussion with loss of consciousness, sequela  S06.0X9S 907.0       3. Whiplash injury to neck, subsequent encounter  S13.4XXD V58.89      847.0       4. Right shoulder pain, unspecified chronicity  M25.511 719.41 Ambulatory referral/consult to Orthopedics      5. Neuropathy  G62.9 355.9       6. Paresthesias  R20.2 782.0          68 y.o. male with h/o DM, HTN, HLD, SAH and concussion with LOC, kinetic force injury with resultant cranio cervical trauma and occipital neuritis s/p medrol dose pack x1 who presents for follow up. Imaging from 6/13/23 injury revealed bilateral frontal and left temporal and right parietoccipital and right cerebellar hemorrhagic contusions with small amount of thin subdural blood along the falx and tentorium with left frontal lobe SAH and small SAH in right posterior frontoparietal region. On exam, he has imbalance and on initial exam vibratory loss in fingers and distal lower extremities, positive right head thrust. We discussed previously that he had a TBI given imaging evidence. We discussed " previously based on imaging evidence, he may have problems with memory and executive functioning but he is not complaining of those functions at this time. We discussed previously exam also indicative of long fiber neuropathy that is likely 2/2 to DM and not related to his injury. Overall, his symptoms are improving with balance now most likely related to his known neuropathy. We discussed again he is describing paresthesias where he hit his head and that a crushed nerve injury may have occurred in the scalp that can take up to 2 years to fully heal or may be permanent and should the paresthesias bother him or last longer, we could do medication for it and will increase his gabapentin.     Plan:     Continue neck PT exercises  The patient was instructed to ice the occipital region for no more than 20 minutes at least once a day but may repeat this as many times as they would like.   Can increase physical activity as tolerated  Continue vestibular PT exercises  You do not feel your feet well due to neuropathy. Please wear footwear with good tread at all times. This recommendation includes wearing socks with tread on them. Make sure all of your walkways, hallways, and julio are well lit at all times day and night. Use night lights to light up commonly used pathways in your home, meaning from the bedroom to the bathroom or kitchen. Make sure all of your walkways, hallways, and julio are clear of obstacles at all times. Obstacles include decorations, rugs/mats, pet beds, shoes, and clothes. Make sure you have good hand holds to grab onto as you walk around your home.  Referral for ortho for right shoulder  Increase gabapentin to 300 mg three times a day  It is with a high degree of medical certainty that this patient's current signs and symptoms were caused or exacerbated by the work related injury mentioned in the note above  The patient can do full duty work with no restrictions from a neurological perspective  but ortho may give restrictions for his shoulder  Any delay in treatment that I am recommending for the patient's work related injury as the result of things like IMEs, FCEs, and denials in the care plan may delay the patient's recovery. Delays in recovery may cause or further worsen any underlying permanent injury that was caused by the result of the patient's injury. Delays in recovery may also cause the development of new medical conditions that will then need to be treated. The development of some forms of permanent injuries may result in nerve injuries that are refractory to initial treatment, which could result in further medical expenses as more expensive methods of treatment or prolonged treatment may be needed to treat the underlying injury. In my collective clinical experience, my care plan as documented from the initial note leads to significant recovery from injuries similar to the patient's in the majority of my other patients. Delays in recovery will prevent the patient from returning to work full time in a timely fashion. Delays in recovery caused by workers compensation for patients with similar injury have also lead to significant financial settlements for the patients should a legal dispute arise. I have counseled the patient on all of the above.  Please be advised that I do not determine MMI and will update in the assessment whether patient is improving or not, or has plateaued to the point of permanent symptoms despite successfully following recommended treatment by myself  Please be advised that the above work status is re-evaluated each visit and that the nature of the patient's injury described in the assessment and diagnoses do not have a standard or expected timeline for recovery as demonstrated in multiple up to date peer-reviewed publications  Please be advised that I do not perform FCEs. I will also not answer or comment on any FCE questions.  Please be advised I will not fill out  additional paperwork that asks me to restate diagnoses, plan of care, work status, and/or accommodations as these are all documented in my clinic note.  Please be advised I will not fill out paperwork asking me to agree with a workers compensation representative's interpretation of my plan of care and/or evaluation of the patient as I stand by what is documented in my clinic note.  Please be advised that any peer to peer requests made on the behalf of workers compensation will need to be scheduled based on my availability and will be completed by myself within 1 week of the request. My clinic is not capable of doing on demand peer to peer requests with less than 48 hours of notice.     Chandrakant Laughlin MD  Sports [facility]

## 2024-01-11 NOTE — PATIENT INSTRUCTIONS
Continue neck PT exercises  The patient was instructed to ice the occipital region for no more than 20 minutes at least once a day but may repeat this as many times as they would like.   Can increase physical activity as tolerated  Continue vestibular PT exercises  You do not feel your feet well due to neuropathy. Please wear footwear with good tread at all times. This recommendation includes wearing socks with tread on them. Make sure all of your walkways, hallways, and julio are well lit at all times day and night. Use night lights to light up commonly used pathways in your home, meaning from the bedroom to the bathroom or kitchen. Make sure all of your walkways, hallways, and julio are clear of obstacles at all times. Obstacles include decorations, rugs/mats, pet beds, shoes, and clothes. Make sure you have good hand holds to grab onto as you walk around your home.  Referral for ortho for right shoulder  Increase gabapentin to 300 mg three times a day  It is with a high degree of medical certainty that this patient's current signs and symptoms were caused or exacerbated by the work related injury mentioned in the note above  The patient can do full duty work with no restrictions from a neurological perspective but ortho may give restrictions for his shoulder  Any delay in treatment that I am recommending for the patient's work related injury as the result of things like IMEs, FCEs, and denials in the care plan may delay the patient's recovery. Delays in recovery may cause or further worsen any underlying permanent injury that was caused by the result of the patient's injury. Delays in recovery may also cause the development of new medical conditions that will then need to be treated. The development of some forms of permanent injuries may result in nerve injuries that are refractory to initial treatment, which could result in further medical expenses as more expensive methods of treatment or prolonged  treatment may be needed to treat the underlying injury. In my collective clinical experience, my care plan as documented from the initial note leads to significant recovery from injuries similar to the patient's in the majority of my other patients. Delays in recovery will prevent the patient from returning to work full time in a timely fashion. Delays in recovery caused by workers compensation for patients with similar injury have also lead to significant financial settlements for the patients should a legal dispute arise. I have counseled the patient on all of the above.  Please be advised that I do not determine MMI and will update in the assessment whether patient is improving or not, or has plateaued to the point of permanent symptoms despite successfully following recommended treatment by myself  Please be advised that the above work status is re-evaluated each visit and that the nature of the patient's injury described in the assessment and diagnoses do not have a standard or expected timeline for recovery as demonstrated in multiple up to date peer-reviewed publications  Please be advised that I do not perform FCEs. I will also not answer or comment on any FCE questions.  Please be advised I will not fill out additional paperwork that asks me to restate diagnoses, plan of care, work status, and/or accommodations as these are all documented in my clinic note.  Please be advised I will not fill out paperwork asking me to agree with a workers compensation representative's interpretation of my plan of care and/or evaluation of the patient as I stand by what is documented in my clinic note.  Please be advised that any peer to peer requests made on the behalf of workers compensation will need to be scheduled based on my availability and will be completed by myself within 1 week of the request. My clinic is not capable of doing on demand peer to peer requests with less than 48 hours of notice.

## 2024-01-26 ENCOUNTER — TELEPHONE (OUTPATIENT)
Dept: NEUROLOGY | Facility: CLINIC | Age: 69
End: 2024-01-26
Payer: OTHER MISCELLANEOUS

## 2024-01-26 NOTE — TELEPHONE ENCOUNTER
Spoke with Pt about his orthopaedic surgeon referral. I let Pt know that workers comp denied it and he would have use his private insurance. Pt stated he does not have insurance. He has applied for insurance.

## 2024-03-12 ENCOUNTER — TELEPHONE (OUTPATIENT)
Dept: NEUROLOGY | Facility: CLINIC | Age: 69
End: 2024-03-12
Payer: OTHER MISCELLANEOUS

## 2024-03-13 ENCOUNTER — OFFICE VISIT (OUTPATIENT)
Dept: NEUROLOGY | Facility: CLINIC | Age: 69
End: 2024-03-13
Payer: OTHER MISCELLANEOUS

## 2024-03-13 VITALS
HEART RATE: 75 BPM | BODY MASS INDEX: 27.67 KG/M2 | WEIGHT: 198.44 LBS | SYSTOLIC BLOOD PRESSURE: 129 MMHG | DIASTOLIC BLOOD PRESSURE: 75 MMHG

## 2024-03-13 DIAGNOSIS — Z79.4 TYPE 2 DIABETES MELLITUS WITH DIABETIC POLYNEUROPATHY, WITH LONG-TERM CURRENT USE OF INSULIN: ICD-10-CM

## 2024-03-13 DIAGNOSIS — E11.42 TYPE 2 DIABETES MELLITUS WITH DIABETIC POLYNEUROPATHY, WITH LONG-TERM CURRENT USE OF INSULIN: ICD-10-CM

## 2024-03-13 DIAGNOSIS — M54.81 OCCIPITAL NEURITIS: ICD-10-CM

## 2024-03-13 DIAGNOSIS — G47.9 FATIGUE DUE TO SLEEP PATTERN DISTURBANCE: ICD-10-CM

## 2024-03-13 DIAGNOSIS — S06.0X9S CONCUSSION WITH LOSS OF CONSCIOUSNESS, SEQUELA: ICD-10-CM

## 2024-03-13 DIAGNOSIS — S06.6X9S SUBARACHNOID HEMORRHAGE FOLLOWING INJURY, WITH LOSS OF CONSCIOUSNESS, SEQUELA: Primary | ICD-10-CM

## 2024-03-13 DIAGNOSIS — R53.83 FATIGUE DUE TO SLEEP PATTERN DISTURBANCE: ICD-10-CM

## 2024-03-13 DIAGNOSIS — M54.2 CERVICALGIA OF OCCIPITO-ATLANTO-AXIAL REGION: ICD-10-CM

## 2024-03-13 DIAGNOSIS — S13.4XXD WHIPLASH INJURY TO NECK, SUBSEQUENT ENCOUNTER: ICD-10-CM

## 2024-03-13 PROCEDURE — 99999 PR PBB SHADOW E&M-EST. PATIENT-LVL IV: CPT | Mod: PBBFAC,,, | Performed by: PSYCHIATRY & NEUROLOGY

## 2024-03-13 PROCEDURE — 99214 OFFICE O/P EST MOD 30 MIN: CPT | Mod: S$GLB,,, | Performed by: PSYCHIATRY & NEUROLOGY

## 2024-03-13 RX ORDER — LEVOTHYROXINE SODIUM 50 UG/1
50 TABLET ORAL EVERY MORNING
COMMUNITY
Start: 2024-02-28

## 2024-03-13 RX ORDER — ERGOCALCIFEROL 1.25 MG/1
CAPSULE ORAL
COMMUNITY

## 2024-03-13 RX ORDER — PEN NEEDLE, DIABETIC 31 GX5/16"
NEEDLE, DISPOSABLE MISCELLANEOUS
COMMUNITY
Start: 2024-02-28

## 2024-03-13 NOTE — PROGRESS NOTES
Chief Complaint: Neck Pain    Subjective:     History of Present Illness    Referring Provider: ED  Date of Injury: 6/13/23  Accompanied by: Daughter  Workers Comp     09/11/2023: Carlin Madrigal is a 68 y.o. male with h/o DM, HTN, HLD who presents for concussion evaluation. 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 Currently, headaches are right occipital, can last all day, 2-3 times a week, pounding. Per daughter, he has described head as bloated. He has a scar on right side of neck from the above injury. He has lower neck pain that is new since above injury. He has associated phonophobia per daughter, weakness in legs, imbalance that is new since above injury, spinning that was present prior to injury that is worse since above injury. He denies photophobia, numbness, tingling, lightheadedness, N/V. He has been having dry mouth. He notes fluctuating vision from diabetes and denies vision changes since above injury. He denies changes in taste, smell, hearing, other vision changes, appetite. He denies tinnitus. Per daughter, he gets bilateral jaw pain for the last 3 weeks that is sharp that is new since above injury and has looked swollen when eating before. He denies cognitive fogginess, concentration difficulties, and memory changes. He is sleeping well and wakes up rested. He has daytime fatigue. He snores and denies it worsening since above injury and denies apnea. He denies positional component but headache worsens when lays on right side and vasal vagal maneuvers do not significantly worsen headaches. He denies headaches waking him up and  does not wake up with headaches. Mood is okay and per daughter he is easily irritable since the above injury that he agrees with. He denies emotional lability. He has only tried Tylenol. He was just prescribed PT to help with headaches. He denies other prior head and neck injuries. Prior to current injury, headaches would be once every 2 weeks from being out in the sun and no associated photophobia, phonophobia, weakness, numbness, tingling, N/V, change in vision, aura and would not stop ADLs. Glucose most recently 124 and has been 160s and 180s.     Per ED note dated 6/13/23, per daughter he passed out at work today per coworkers and upon awakening could not remember where he was or what happened, he reports becoming dizzy but does not remember passing out     Per ED note dated 5/25/23, he has had 1 syncopal episode, has had intermittent blurred vision and dizziness described as lightheadedness that started 1 week ago, 1 week of right shoulder pain, and neck pain that began 2 weeks ago and denies trauma or injuries to the area and was sudden onset.     09/27/2023: Carlin Madrigal is a 68 y.o. male  with h/o DM, HTN, HLD, SAH and concussion with LOC, kinetic force injury with resultant cranio cervical trauma and occipital neuritis s/p medrol dose pack x1 who presents for follow up. On last visit, patient was prescribed a Medrol dose pack and started on ice therapy, ergonomics, and exercise restrictions and referred for vestibular PT and not to start neck PT and counseled on neuropathy precautions. In the interim, stopped Medrol as glucose went > 200. Offered patient translational services if needed and he declines at this time and let him know it is a free service should he choose to need it during the visit. He feels better compared to when injury first happened. Headaches are 1-3 times a week per a calendar he shows me, right occipital, cannot describe pain quality, lasts whole day but not into next day. He denies  neck pain. He describes generalized weakness. He denies photophobia, phonophobia, numbness, tingling, dizziness, N/V. He thinks he has difficulties seeing from diabetes and denies changes from his injury. He is sleeping well and wakes up feeling well. Mood is okay. He stopped taking Medrol on day 3 of the pack as his glucose was going > 200 and denies side effects. He is icing. He has not heard about vestibular PT. He is not doing neck PT. He is following neuropathy precautions.      11/08/2023: Carlin Madrigal is a 68 y.o. male with h/o DM, HTN, HLD, SAH and concussion with LOC, kinetic force injury with resultant cranio cervical trauma and occipital neuritis s/p medrol dose pack x1 who presents for follow up. On last visit, patient was referred for lower neck/upper back to mid back PT with myofascial release and continued on ice therapy, ergonomics, and exercise restrictions and referred for vestibular PT and counseled on neuropathy precautions. Offered patient translational services if needed and he declines at this time and let him know it is a free service should he choose to need it during the visit. He states that his head does not hurt but describes pins and needles sensation in right occipital region that can happen multiple times a day or not at all and feels it when turns head to the right and lasts 3-4 minutes. He denies neck pain. He denies photophobia, phonophobia, numbness, tingling, N/V, dizziness. He describes difficulties with far vision that he thinks may be from his diabetes. He describes generalized weakness and describes will get cramp in right posterior leg when tries to lift something. He is sleeping well and wakes up rested. Mood is good. He is doing neck PT and is helping and is doing vestibular PT that is helping but still feels imbalance.  He is following neuropathy precautions. He is not icing. He describes bilateral jaw pain and states jaw will get swollen when tries to chew. He states he  has upcoming appointment with dentist. He has new feeling that smells are staying in his nose.     01/11/2024: Carlin Madrigal is a 68 y.o. male with h/o DM, HTN, HLD, SAH and concussion with LOC, kinetic force injury with resultant cranio cervical trauma and occipital neuritis s/p medrol dose pack x1 who presents for follow up. On last visit, patient was continued on lower neck/upper back to mid back PT with myofascial release, ice therapy, ergonomics, and exercise restrictions and vestibular PT and counseled on neuropathy precautions. Offered patient translational services if needed and he declines at this time and let him know it is a free service should he choose to need it during the visit. He states he is doing well. He has slight pain in right base of skull, near daily for this week, 10 mins, triggered when turns head to the right, bothersome pain is best pain description, denies other headaches. He denies neck pain. He has right shoulder pain and sometimes has right hand weakness as a result. He states someone is trying to get him a shoulder specialist but he has not heard from one and is agreeable to a referral from me.  He denies photophobia, phonophobia, numbness, tingling, dizziness, N/V, change in vision. He is sleeping well and right lateral shoulder pain will wake him up and wakes up ready to go. Mood is good. He finished neck PT and does home exercises and PT helped. He is not needing to ice. He does vestibular PT exercises at home as he finished it and helped him. He is following neuropathy precautions. He denies side effects to gabapentin he is on and is taking 300 mg QAM and QPM only.    03/13/2024: Carlin Madrigal is a 69 y.o. male with h/o DM, HTN, HLD, SAH and concussion with LOC, kinetic force injury with resultant cranio cervical trauma and occipital neuritis s/p medrol dose pack x1 who presents for follow up. On last visit, patient was continued on neck exercises, ice therapy and to increase  physical activity as tolerated and continued vestibular PT exercises and counseled on neuropathy precautions and referred for ortho and increased gabapentin. Offered patient translational services if needed and he declines at this time and let him know it is a free service should he choose to need it during the visit. He indicates he is still having right upper cervical paraspinal pain that can radiate down right side of neck. He denies headaches. He states when he has neck pain he has to go lay down. He has generalized weakness. He still has right shoulder pain with numbness in right shoulder. He states he will sometimes have vision problems due to his diabetes. He denies photophobia, phonophobia, tingling, N/V, dizziness. He is sleeping well but his right shoulder will bother him and wakes up rested. Mood is good. He is doing neck exercises. He is doing vestibular PT exercises. He is following neuropathy precautions. He states increased gabapentin is helping and denies side effects. He has appointment for ortho and per daughter workers comp denied coverage for it. Per daughter, 1 month ago he was driving he could not turn his neck and did not know where he was and had to pull over and took 20 minutes before he could go back home. 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 Per he and daughter, head feels heavy and he indicates heaviness in occipital region. Per daughter, he has been complaining of jaw pain since he got hurt and workers comp has denied jaw evaluation. Per daughter, going back to work was a failure due to getting exhausted. Per daughter, he had to be started on a new blood pressure medication because blood pressure has been elevated.    Current Outpatient Medications on File Prior to Visit   Medication Sig Dispense Refill    BD ULTRA-FINE MINI PEN  "NEEDLE 31 gauge x 3/16" Ndle SMARTSI Each SUB-Q Twice Daily      diclofenac sodium (VOLTAREN) 1 % Gel Apply 2 g topically 3 (three) times daily. 100 g 0    ergocalciferol (ERGOCALCIFEROL) 50,000 unit Cap Vitamin D2 1,250 mcg (50,000 unit) capsule   TAKE ONE CAPSULE BY MOUTH EVERY WEEK      gabapentin (NEURONTIN) 300 MG capsule Take 1 capsule (300 mg total) by mouth 3 (three) times daily. 90 capsule 5    levothyroxine (SYNTHROID) 50 MCG tablet Take 50 mcg by mouth every morning.      lisinopriL (PRINIVIL,ZESTRIL) 40 MG tablet Take 40 mg by mouth Daily.      metFORMIN (GLUCOPHAGE) 1000 MG tablet Take 1,000 mg by mouth 2 (two) times a day.      NOVOLIN 70/30 U-100 INSULIN 100 unit/mL (70-30) injection Inject 15 Units into the skin 2 (two) times a day.      pravastatin (PRAVACHOL) 20 MG tablet Take 20 mg by mouth Daily.      amLODIPine (NORVASC) 5 MG tablet Take 1 tablet (5 mg total) by mouth once daily. 30 tablet 1     No current facility-administered medications on file prior to visit.       Review of patient's allergies indicates:  No Known Allergies    History reviewed. No pertinent family history.    Social History     Tobacco Use    Smoking status: Former     Types: Cigarettes     Passive exposure: Never    Smokeless tobacco: Never   Substance Use Topics    Alcohol use: Not Currently     Comment: 23: occ    Drug use: Not Currently       Review of Systems  Constitutional: No fevers, no chills, no change in weight  Eye/Vision: See HPI  Ear/Nose/Mouth/Throat: See HPI; no cough, no runny nose, no sore throat  Respiratory: No shortness of breath, no problems breathing  Cardiovascular: No chest pain  Gastrointestinal: See HPI, no diarrhea, no constipation  Genitourinary: No dysuria  Musculoskeletal: See HPI  Integumentary: No skin changes  Neurologic: See HPI  Psychiatric: Denies depression, denies anxiety, denies SI and HI.  Additional System Information:     Objective:     Vitals:    24 1008   BP: " "129/75   Pulse: 75       General: Alert and awake, Well nourished, Well groomed, No acute distress, no photophobia with 60 Hz hypersensitivity.  Eyes: Extraocular movements are intact; Normal conjunctiva; no nystagmus; Visual fields are intact bilaterally to finger counting in all cardinal directions  Neck: Supple  No Stiffness  Patient has occipital point tenderness over the right greater and lesser occipital nerve without induction of headaches with jump sign and no twitch response and no referred pain: 1+   No high, medial cervical pain with lateral movement of C1 over C2 and with isometric neck flexion and extension  Fluid patient turnaround with concurrent neck movement in direction of torso movement.  Right paraspinal cervical muscle spasm present  Spine/torso exam: Spine/ torso exam is within normal limits    Neurologic Exam  no saccadic intrusions of volitional ocular smooth pursuits  no pain with sustained upgaze and convergence  no visual motion sensitivity/dizziness produced with rapid eye movements or neck movements  no convergence insufficiency with no diplopia developed > 5 " accommodation    Sensory: Negative Romberg, no falls on tandem stance    Gait: Gait WNL, Heel to toe walking WNL    Labs:    No new labs    Imaging:    No new studies    Assessment:       ICD-10-CM ICD-9-CM    1. Subarachnoid hemorrhage following injury, with loss of consciousness, sequela  S06.6X9S 907.0       2. Concussion with loss of consciousness, sequela  S06.0X9S 907.0       3. Whiplash injury to neck, subsequent encounter  S13.4XXD V58.89 Ambulatory referral/consult to Physical/Occupational Therapy     847.0       4. Cervicalgia of menkvynq-rnfxtgd-limbp region  M54.2 723.1 Ambulatory referral/consult to Physical/Occupational Therapy      5. Occipital neuritis  M54.81 723.8       6. Fatigue due to sleep pattern disturbance  R53.83 780.79     G47.9 780.50       7. Type 2 diabetes mellitus with diabetic polyneuropathy, with " long-term current use of insulin  E11.42 250.60     Z79.4 357.2      V58.67          69 y.o. male with h/o DM, HTN, HLD, SAH and concussion with LOC, kinetic force injury with resultant cranio cervical trauma and occipital neuritis s/p medrol dose pack x1 who presents for follow up. Imaging from 6/13/23 injury revealed bilateral frontal and left temporal and right parietoccipital and right cerebellar hemorrhagic contusions with small amount of thin subdural blood along the falx and tentorium with left frontal lobe SAH and small SAH in right posterior frontoparietal region. On exam, he has occipital point tenderness over the right greater and lesser occipital nerve without induction of headaches with jump sign and no twitch response and no referred pain and on initial exam vibratory loss in fingers and distal lower extremities, positive right head thrust. We discussed previously that he had a TBI given imaging evidence. We discussed previously based on imaging evidence, he may have problems with memory and executive functioning but he is not complaining of those functions at this time. We discussed previously exam also indicative of long fiber neuropathy that is likely 2/2 to DM and not related to his injury. Overall, his symptoms are persistent with exam worsening in neck. We discussed previously he is describing paresthesias where he hit his head and that a crushed nerve injury may have occurred in the scalp that can take up to 2 years to fully heal or may be permanent and should the paresthesias bother him or last longer, we could do medication for it and will continue gabapentin. We discussed how his right shoulder can be impacting his neck symptoms and exam. We discussed repeating myofascial neck PT and adding work hardening to help with stamina.     Plan:     Referral for lower neck/upper back to mid back PT with myofascial release (therapist may choose from and do a combination of: deep tissue massage,  cupping, dry needling, etc). No soft tissue manipulation of suboccipital region if you start to feel worse. All joint manipulation is fine.  Please have therapy do work hardening with you  The patient was instructed to ice the occipital region for no more than 20 minutes at least once a day but may repeat this as many times as they would like.   Can increase physical activity as tolerated  Continue vestibular PT exercises  You do not feel your feet well due to neuropathy. Please wear footwear with good tread at all times. This recommendation includes wearing socks with tread on them. Make sure all of your walkways, hallways, and julio are well lit at all times day and night. Use night lights to light up commonly used pathways in your home, meaning from the bedroom to the bathroom or kitchen. Make sure all of your walkways, hallways, and julio are clear of obstacles at all times. Obstacles include decorations, rugs/mats, pet beds, shoes, and clothes. Make sure you have good hand holds to grab onto as you walk around your home.  Referral for ortho for right shoulder placed previously  Continue gabapentin 300 mg three times a day  It is with a high degree of medical certainty that this patient's current signs and symptoms were caused or exacerbated by the work related injury mentioned in the note above  The patient can only do light duty desk work at this time due to his fatigue  Any delay in treatment that I am recommending for the patient's work related injury as the result of things like IMEs, FCEs, and denials in the care plan may delay the patient's recovery. Delays in recovery may cause or further worsen any underlying permanent injury that was caused by the result of the patient's injury. Delays in recovery may also cause the development of new medical conditions that will then need to be treated. The development of some forms of permanent injuries may result in nerve injuries that are refractory to  initial treatment, which could result in further medical expenses as more expensive methods of treatment or prolonged treatment may be needed to treat the underlying injury. In my collective clinical experience, my care plan as documented from the initial note leads to significant recovery from injuries similar to the patient's in the majority of my other patients. Delays in recovery will prevent the patient from returning to work full time in a timely fashion. Delays in recovery caused by workers compensation for patients with similar injury have also lead to significant financial settlements for the patients should a legal dispute arise. I have counseled the patient on all of the above.  Please be advised that I do not determine MMI and will update in the assessment whether patient is improving or not, or has plateaued to the point of permanent symptoms despite successfully following recommended treatment by myself  Please be advised that the above work status is re-evaluated each visit and that the nature of the patient's injury described in the assessment and diagnoses do not have a standard or expected timeline for recovery as demonstrated in multiple up to date peer-reviewed publications  Please be advised that I do not perform FCEs. I will also not answer or comment on any FCE questions.  Please be advised I will not fill out additional paperwork that asks me to restate diagnoses, plan of care, work status, and/or accommodations as these are all documented in my clinic note.  Please be advised I will not fill out paperwork asking me to agree with a workers compensation representative's interpretation of my plan of care and/or evaluation of the patient as I stand by what is documented in my clinic note.  Please be advised that any peer to peer requests made on the behalf of workers compensation will need to be scheduled based on my availability and will be completed by myself within 1 week of the request. My  clinic is not capable of doing on demand peer to peer requests with less than 48 hours of notice.     Chandrakant Laughlin MD  Sports Neurology

## 2024-03-13 NOTE — PATIENT INSTRUCTIONS
Referral for lower neck/upper back to mid back PT with myofascial release (therapist may choose from and do a combination of: deep tissue massage, cupping, dry needling, etc). No soft tissue manipulation of suboccipital region if you start to feel worse. All joint manipulation is fine.  Please have therapy do work hardening with you  The patient was instructed to ice the occipital region for no more than 20 minutes at least once a day but may repeat this as many times as they would like.   Can increase physical activity as tolerated  Continue vestibular PT exercises  You do not feel your feet well due to neuropathy. Please wear footwear with good tread at all times. This recommendation includes wearing socks with tread on them. Make sure all of your walkways, hallways, and julio are well lit at all times day and night. Use night lights to light up commonly used pathways in your home, meaning from the bedroom to the bathroom or kitchen. Make sure all of your walkways, hallways, and julio are clear of obstacles at all times. Obstacles include decorations, rugs/mats, pet beds, shoes, and clothes. Make sure you have good hand holds to grab onto as you walk around your home.  Referral for ortho for right shoulder placed previously  Continue gabapentin 300 mg three times a day  It is with a high degree of medical certainty that this patient's current signs and symptoms were caused or exacerbated by the work related injury mentioned in the note above  The patient can only do light duty desk work at this time due to his fatigue  Any delay in treatment that I am recommending for the patient's work related injury as the result of things like IMEs, FCEs, and denials in the care plan may delay the patient's recovery. Delays in recovery may cause or further worsen any underlying permanent injury that was caused by the result of the patient's injury. Delays in recovery may also cause the development of new medical  conditions that will then need to be treated. The development of some forms of permanent injuries may result in nerve injuries that are refractory to initial treatment, which could result in further medical expenses as more expensive methods of treatment or prolonged treatment may be needed to treat the underlying injury. In my collective clinical experience, my care plan as documented from the initial note leads to significant recovery from injuries similar to the patient's in the majority of my other patients. Delays in recovery will prevent the patient from returning to work full time in a timely fashion. Delays in recovery caused by workers compensation for patients with similar injury have also lead to significant financial settlements for the patients should a legal dispute arise. I have counseled the patient on all of the above.  Please be advised that I do not determine MMI and will update in the assessment whether patient is improving or not, or has plateaued to the point of permanent symptoms despite successfully following recommended treatment by myself  Please be advised that the above work status is re-evaluated each visit and that the nature of the patient's injury described in the assessment and diagnoses do not have a standard or expected timeline for recovery as demonstrated in multiple up to date peer-reviewed publications  Please be advised that I do not perform FCEs. I will also not answer or comment on any FCE questions.  Please be advised I will not fill out additional paperwork that asks me to restate diagnoses, plan of care, work status, and/or accommodations as these are all documented in my clinic note.  Please be advised I will not fill out paperwork asking me to agree with a workers compensation representative's interpretation of my plan of care and/or evaluation of the patient as I stand by what is documented in my clinic note.  Please be advised that any peer to peer requests made on  the behalf of workers compensation will need to be scheduled based on my availability and will be completed by myself within 1 week of the request. My clinic is not capable of doing on demand peer to peer requests with less than 48 hours of notice.

## 2024-03-25 PROBLEM — S06.6X9A SUBARACHNOID HEMORRHAGE FOLLOWING INJURY, WITH LOSS OF CONSCIOUSNESS: Status: RESOLVED | Noted: 2023-09-11 | Resolved: 2024-03-25

## 2024-04-08 ENCOUNTER — CLINICAL SUPPORT (OUTPATIENT)
Dept: REHABILITATION | Facility: HOSPITAL | Age: 69
End: 2024-04-08
Payer: OTHER MISCELLANEOUS

## 2024-04-08 DIAGNOSIS — M25.511 CHRONIC RIGHT SHOULDER PAIN: ICD-10-CM

## 2024-04-08 DIAGNOSIS — R29.898 DECREASED ROM OF NECK: ICD-10-CM

## 2024-04-08 DIAGNOSIS — S06.0X9S CONCUSSION WITH LOSS OF CONSCIOUSNESS, SEQUELA: Primary | ICD-10-CM

## 2024-04-08 DIAGNOSIS — S06.6X9S SUBARACHNOID HEMORRHAGE FOLLOWING INJURY, WITH LOSS OF CONSCIOUSNESS, SEQUELA: ICD-10-CM

## 2024-04-08 DIAGNOSIS — G89.29 CHRONIC RIGHT SHOULDER PAIN: ICD-10-CM

## 2024-04-08 DIAGNOSIS — S13.4XXD WHIPLASH INJURY TO NECK, SUBSEQUENT ENCOUNTER: ICD-10-CM

## 2024-04-08 PROCEDURE — 97110 THERAPEUTIC EXERCISES: CPT

## 2024-04-08 PROCEDURE — 97161 PT EVAL LOW COMPLEX 20 MIN: CPT

## 2024-04-08 PROCEDURE — 97545 WORK HARDENING: CPT

## 2024-04-08 NOTE — PLAN OF CARE
[facility]  Physical Therapy Initial Evaluation    Name: Carlin Madrigal  Clinic Number: [id number]    Therapy Diagnosis:   Encounter Diagnoses   Name Primary?    Concussion with loss of consciousness, sequela Yes    Decreased ROM of neck     Chronic right shoulder pain      Physician: Chandrakant Laughlin MD    Physician Orders: PT Eval and Treat: Work Hardening/Conditioning   Medical Diagnosis from Referral:   S06.6X9S (ICD-10-CM) - Subarachnoid hemorrhage following injury, with loss of consciousness, sequela   S06.0X9S (ICD-10-CM) - Concussion with loss of consciousness, sequela   S13.4XXD (ICD-10-CM) - Whiplash injury to neck, subsequent encounter   Evaluation Date: 4/8/2024  Authorization Period Expiration: 10/2/2024  Plan of Care Expiration: 5/10/2024  Visit # / Visits authorized: 1/12    Time In: 810  Time Out: 910  Total Appointment Time (timed & untimed codes): 60 minutes    Precautions: Standard and Diabetes    Subjective     History of current condition - Carlin reports:      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     11 of 12 authorized vestibular PT visits. Referred for Work Conditioning as he attempted to go back to work and failed due to exhaustion.     Chief c/o:   Reports ongoing R sided neck pain and R shoulder pain .      Pain:  Neck: Pain 4/10. R sided neck pain when turning to the right. Local to the right side of the neck, does not radiate into the R UE. Limited with looking up and over to the right.   R shoulder: (RHD) Pain 6-10/10 anterior R shoulder. Reports increase in s/s with reaching and  unable to sleep on the R side due to the shoulder pain.     Also reports that he is following up with his PCP for DM.     Reports out of work and has not returned since June of last year.   Company: [employer]   Job title:  - primarily just doing framing work    Medical History:   Past Medical History:   Diagnosis Date    Diabetes mellitus     Hypertension      Surgical History:   Carlin Madrigal  has no past surgical history on file.    Medications:   Carlin has a current medication list which includes the following prescription(s): amlodipine, bd ultra-fine mini pen needle, diclofenac sodium, ergocalciferol, gabapentin, levothyroxine, lisinopril, metformin, novolin 70/30 u-100 insulin, and pravastatin.    Allergies:   Review of patient's allergies indicates:  No Known Allergies     Imaging,   Xray shoulder 5/25/2023:Osteoarthritic changes in the right shoulder with no acute fracture or dislocation.   Xray cervical 5/25/2023: No acute abnormality involving the cervical spine. Cervical spondylosis without subluxation.    Social History:  lives with their family    Pt's goals: Return to gainful employment; return to beng able to lift his R shoulder overhead, lift and carry things with his R shoulder, sleep better on the R shoulder, and being able to look all the way to the right and up without pain to be able to scan the environment     Objective     Cervical Screening:   - Flexion 64, extension 32 with R sided neck pain end ROM, 68 L rotation vs 47 R rotation  - Quadrant R extension with (+) CS R sided neck s/s  - ttp to R suboccipital mm  - R  position II 50 vs L 65lbs (RHD)   - PAIVM: (+) for JS and mild pain R C1/2 and C2/3  - Flexion Rotation test (+) to R    Shoulder Screening:   Observations: Pt demonstrates upper crossed posture with head forward and rounded shoulders  Palpation: Pt with ttp to the R RTC region about the R Supraspinatus    Shoulder AROM Right (*) = in degrees Left (*) =  "in degrees   Flexion 104 115   Scaption 82 124   IR LSJ : functional reaching  74* PROM and painful end ROM T8   ER CTJ : functional reaching T4/5     Thoracic Screening: limited AROM extension and rotation R/L at end ranges of motion    Shoulder MMT    Shoulder MMT scores Right: X/5 Left: x/5   Scaption 3+p/5 4/5    IR 4/5 5/5    ER 4-p /5 4+/5     Special testing cluster exams:  RTC pathology: Painful arc, Drop arm sign, Infraspinatus: Met 2/3    Joint testing: JS w/ posterior glide GH joint    TREATMENT   Treatment Time In: 845  Treatment Time Out: 910  Total Treatment time (time-based codes) separate from Evaluation: 25 minutes       Carlin received the following treatment separate from the evaluation:     Neuromusculoskeletal function activities:    R shoulder SL ER 1# x12  R shoulder SL Hx Abduction 0# x12  R shoulder prone row 10# x12  Planned shoulder isometric ER walk out  Motor function activities:    Planned supine cook band pull downs  Range of motion activities:    R cervical rotation snag x10  R LS stretch 3x30"  Planned thoracic extension over chair or foam roll  Planned physioball flexion shoulder roll out  Cardiovascular function activities:    Planned either UBE or TM  Functional activities:    Planned farmer carries  Manual Therapy:    Planned R shoulder PA, MWM flexion and ER  Planned cervical distraction, METs  R C1/2 MET - side bend max to L, then rotate R to end ROM. Have the pt slightly rotate their head left into you hand, matching you resistance. Hold for 6 seconds, then  slack into R rotation and repeat.   Upper Cervical rotation into flexion - flex cervical spine to man flexion, turn head to R within pain free ROM. Have pt slightly rotation L into your hand, matching your resistance. Hold 6 seconds.  slack into R rotation and repeat.     Home Exercises and Patient Education Provided    Education provided:   - HEP, POC    Written Home Exercises Provided: yes.  Exercises were " reviewed and Carlin was able to demonstrate them prior to the end of the session.  Carlin demonstrated good  understanding of the education provided.     See EMR under Patient Instructions for exercises provided 4/8/2024.    Assessment   Carlin is a 69 y.o. male referred to outpatient Rehabilitation for Work Conditioning with a medical diagnosis of WAD, Concussion with loss of consciousness with subarachnoid hemorrhage. 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 At that time he had no orientation to person or place and was sent to the ED.     To date, he has participated in 11 of 12 authorized vestibular PT visits and has since been discharged. He is being referred today for Work Conditioning as he attempted to go back to work and failed due to exhaustion and ongoing pain.     He is with chief c/o ongoing R sided neck pain and R shoulder pain.   - Crevical Summary: He is with c/o R sided neck pain when turning to the right; this is consistent with examination today with marked restrictions noted and (+)CS with R rotation, extension and worse with quadrant testing. Spurling's held due to s/s in start positioning; however, PAIVM testing was completed and upper cervical spine R sided restrictions noted and confirmed with cervical flexion, rotation testing R.   - R shoulder summary: PT presents with #1 c/o R anterior to superior shoulder pain which is worse with any reaching and unable to sleep on the R side due to the shoulder pain. S/s today consistent with potential RTC involvement as pt with 2/3 (+) cluster exam items. Pt with marked restriction scaption ROM with marked pain and weakness with scaption and ER testing.     Important to note exam held to 1 hour today vs 2 hours for Work Conditioning  due to therapist unexpected illness. Though this is the case, I believe we will need to focus on traditional therapeutic approaches given his s/s as he does not appear completely ready for Work Conditioning. Though this is the case, we will add functional activities and work simulation into programming, though may be limited due to patient tolerance.     [Have received communication that the patient is authorized for traditional therapy, not work conditioning at this time and amending note title to reflect these changes as well as any charges to reflect what is authorized/performed.]    Pt prognosis is Fair<>Good.     Skilled Physical Therapy intervention is required at this time for the injured worker to address the musculoskeletal limitations and work-related functional deficits for their job as a  for [employer].    Plan of care discussed with patient: Yes  Pt's spiritual, cultural and educational needs considered and patient is agreeable to the plan of care and goals as stated below:     Anticipated Barriers for therapy: chronicity of current injury    Medical Necessity is demonstrated by the following  History  Co-morbidities and personal factors that may impact the plan of care [] LOW: no personal factors / co-morbidities  [x] MODERATE: 1-2 personal factors / co-morbidities  [] HIGH: 3+ personal factors / co-morbidities    Moderate / High Support Documentation:   Co-morbidities affecting plan of care: See PMHx    Personal Factors:   age  lifestyle     Examination  Body Structures and Functions, activity limitations and participation restrictions that may impact the plan of care [] LOW: addressing 1-2 elements  [x] MODERATE: 3+ elements  [] HIGH: 4+ elements (please support below)    Moderate / High Support Documentation: See obj measures     Clinical Presentation [x] LOW: stable  [] MODERATE: Evolving  [] HIGH: Unstable     Decision Making/ Complexity Score: low         Goals:     LongTerm Goals: 4  weeks:     1.) Pt will become compliant and independent with the initial HEP to promote decreased pain and improved mobility and strength.   2.) Pt will become compliant and independent with an updated, progressed HEP to promote decreased pain and improved mobility and strength as well as prep for discharge from further PT intervention.   3.) Pt to report decrease in pain levels from 10/10 at worse to 3/10 at worse.   4.) Pt will improve R shoulder MMT scores by 2 muscle grade to improve functional stability and strength.   5.) Pt will demonstrate improved ability to reach over head with little to no c/o with the left shoulders in order to demonstrate improved functional shoulder mobility and self care.   6.) Pt will report 75%+ of his PLOF to demonstrate return to ADLs and community activities.   7.) Pt will demonstrate ability to lift 50lbs from floor to waist with good body mechanics within the clinic in order to simulate RTW goals.   8.) Pt will demonstrate ability to push and pull 100lbs x100 ft within the clinic in order to simulate RTW goals.    9.) Pt will demonstrate R cervical ROM WFL in order to improve ability to scan environment.     Pt will return to work full duty, full time.    Plan   Plan of care Certification: 4/8/2024 to 5/10/2024    Important to note, that the oatient may need an additional bout of PT by end of current POC based on presentation today.    Outpatient Physical Therapy 3 times weekly for 4 weeks to include the following interventions: Manual Therapy, Neuromuscular Re-ed, Patient Education, Self Care, Therapeutic Activities, and Therapeutic Exercise.     Yaron Celis, PT  4/8/2024    I CERTIFY THE NEED FOR THESE SERVICES FURNISHED UNDER THIS PLAN OF TREATMENT AND WHILE UNDER MY CARE   Physician's comments:     Physician's Signature: ___________________________________________________

## 2024-04-08 NOTE — PATIENT INSTRUCTIONS
"Access Code: [email or web address]  URL: [email or web address]  Date: 04/08/2024  Prepared by: Yaron Celis    Exercises  - Sidelying Shoulder External Rotation  - 1 x daily - 7 x weekly - 3 sets - 12 reps  - Sidelying Shoulder Horizontal Abduction  - 1 x daily - 7 x weekly - 2 sets - 12 reps  - Prone Shoulder Row  - 1 x daily - 7 x weekly - 3 sets - 12 reps  - Seated Assisted Cervical Rotation with Towel - To Limited Side  - 1 x daily - 7 x weekly - 2 sets - 10 reps  - Gentle Levator Scapulae Stretch  - 1 x daily - 7 x weekly - 1 sets - 3 reps - 30" hold  "

## 2024-04-09 ENCOUNTER — TELEPHONE (OUTPATIENT)
Dept: NEUROLOGY | Facility: CLINIC | Age: 69
End: 2024-04-09
Payer: OTHER MISCELLANEOUS

## 2024-04-11 ENCOUNTER — CLINICAL SUPPORT (OUTPATIENT)
Dept: REHABILITATION | Facility: HOSPITAL | Age: 69
End: 2024-04-11
Payer: OTHER MISCELLANEOUS

## 2024-04-11 DIAGNOSIS — G89.29 CHRONIC RIGHT SHOULDER PAIN: ICD-10-CM

## 2024-04-11 DIAGNOSIS — R29.898 DECREASED ROM OF NECK: Primary | ICD-10-CM

## 2024-04-11 DIAGNOSIS — M25.511 CHRONIC RIGHT SHOULDER PAIN: ICD-10-CM

## 2024-04-11 DIAGNOSIS — S13.4XXD WHIPLASH INJURY TO NECK, SUBSEQUENT ENCOUNTER: ICD-10-CM

## 2024-04-11 PROCEDURE — 97110 THERAPEUTIC EXERCISES: CPT | Mod: CQ

## 2024-04-11 PROCEDURE — 97112 NEUROMUSCULAR REEDUCATION: CPT | Mod: CQ

## 2024-04-11 NOTE — PROGRESS NOTES
[facility]   Physical Therapy Treatment Note      Name: Carlin Madrigal  Clinic Number: [id number]    Therapy Diagnosis: No diagnosis found.  Physician: Chandrakant Laughlin MD    Visit Date: 4/11/2024    Physician Orders: PT Eval and Treat: Work Hardening/Conditioning   Medical Diagnosis from Referral:   S06.6X9S (ICD-10-CM) - Subarachnoid hemorrhage following injury, with loss of consciousness, sequela   S06.0X9S (ICD-10-CM) - Concussion with loss of consciousness, sequela   S13.4XXD (ICD-10-CM) - Whiplash injury to neck, subsequent encounter   Evaluation Date: 4/8/2024  Authorization Period Expiration: 10/2/2024  Plan of Care Expiration: 5/10/2024  Visit # / Visits authorized: 2/12  PTA: 1/5    FOTO: 1/3     Time In: 1:35pm  Time Out: 2:30pm  Total Appointment Time: 55 minutes    Precautions: Standard and Diabetes     Occupation/Job title:   Job Demands:   Current Work Restrictions:   Previous work status:   Current work status:   Date last worked (if applicable):     Subjective     Pt reports: feels that he is very limited with ADLs due to his right shoulder and right sided neck pain   He was compliant with home exercise program.  Response to previous treatment: increase pain in R levator scapulae   Function: can't do any activities overhead    Pain: 4/10 right side of neck & 8/10 pain shoulder with overhead movement   Location:  right side of neck      Objective      Objective Measures updated at progress report unless specified.     Treatment     Carlin received the treatments listed below:      therapeutic exercises to develop strength, endurance, ROM, flexibility, posture, and core stabilization for 25 minutes including:  Right Levator scap stretch: 3x30 sec  Snag right rotation: 2x10 c/ 5 sec hold  AAROM with dowel shoulder flexion: x20 c/ 5 sec hold  AAROM with dowel shoulder ER: x20 c/ 5 sec hold      manual therapy techniques for 0 minutes,  including:      neuromuscular re-education activities to improve: Balance, Coordination, Kinesthetic, Sense, Proprioception, and Posture for 30 minutes. The following activities were included:  Scapular retractions: x15 c/ 5 sec hold  Rows with red tb: 2x10 c/ 5 sec hold  Shoulder extensions with red tb: 2x10 c/ 5 sec hold  Right shoulder ER walkouts with yellow tb: 2x10 c/ 5 sec hold  Side lying shoulder ER: 2x10  Prone rows: 2x10 c/ 5 sec hold      therapeutic activities to improve functional performance for 0 minutes, including:        Patient Education and Home Exercises       Education provided:   - Patient was educated on HEP and on all therapeutic interventions performed during today's treatment visit.     Home Exercises Provided: Patient instructed to cont prior HEP. Exercises were reviewed and Carlin was able to demonstrate them prior to the end of the session.  Carlin demonstrated good  understanding of the education provided. See EMR under Patient Instructions for exercises provided during therapy sessions    Assessment   Patient provided fair tolerance to most therapeutic exercises as patient endorsed pain when extending excessively and with end range ER. Patient was instructed to perform all activities in pain free range. Treatment will be expanded per patient tolerance.     Patient prognosis is: Fair<>Good    Pt will continue to benefit from skilled Physical Therapy interventions in order to address the deficits listed in the problem list box on initial evaluation, provide education, and to address the musculoskeletal limitations and work-related functional deficits for their job as a .    Pt's spiritual, cultural and educational needs considered and pt agreeable to plan of care and goals.     Anticipated barriers to physical therapy: chronicity of current injury     Goals:   LongTerm Goals: 4 weeks:      1.) Pt will become compliant and independent with the initial HEP to promote  decreased pain and improved mobility and strength.   2.) Pt will become compliant and independent with an updated, progressed HEP to promote decreased pain and improved mobility and strength as well as prep for discharge from further PT intervention.   3.) Pt to report decrease in pain levels from 10/10 at worse to 3/10 at worse.   4.) Pt will improve R shoulder MMT scores by 2 muscle grade to improve functional stability and strength.   5.) Pt will demonstrate improved ability to reach over head with little to no c/o with the left shoulders in order to demonstrate improved functional shoulder mobility and self care.   6.) Pt will report 75%+ of his PLOF to demonstrate return to ADLs and community activities.   7.) Pt will demonstrate ability to lift 50lbs from floor to waist with good body mechanics within the clinic in order to simulate RTW goals.   8.) Pt will demonstrate ability to push and pull 100lbs x100 ft within the clinic in order to simulate RTW goals.    9.) Pt will demonstrate R cervical ROM WFL in order to improve ability to scan environment.      Pt will return to work full duty, full time.    Plan   Plan of care Certification: 4/8/2024 to 5/10/2024     Important to note, that the oatient may need an additional bout of PT by end of current POC based on presentation today.     Outpatient Physical Therapy 3 times weekly for 4 weeks to include the following interventions: Manual Therapy, Neuromuscular Re-ed, Patient Education, Self Care, Therapeutic Activities, and Therapeutic Exercise.        Preston Morales, PTA

## 2024-04-12 ENCOUNTER — CLINICAL SUPPORT (OUTPATIENT)
Dept: REHABILITATION | Facility: HOSPITAL | Age: 69
End: 2024-04-12
Payer: OTHER MISCELLANEOUS

## 2024-04-12 DIAGNOSIS — R29.898 DECREASED ROM OF NECK: Primary | ICD-10-CM

## 2024-04-12 DIAGNOSIS — G89.29 CHRONIC RIGHT SHOULDER PAIN: ICD-10-CM

## 2024-04-12 DIAGNOSIS — S13.4XXD WHIPLASH INJURY TO NECK, SUBSEQUENT ENCOUNTER: ICD-10-CM

## 2024-04-12 DIAGNOSIS — M25.511 CHRONIC RIGHT SHOULDER PAIN: ICD-10-CM

## 2024-04-12 PROCEDURE — 97140 MANUAL THERAPY 1/> REGIONS: CPT | Mod: CQ

## 2024-04-12 PROCEDURE — 97112 NEUROMUSCULAR REEDUCATION: CPT | Mod: CQ

## 2024-04-12 PROCEDURE — 97110 THERAPEUTIC EXERCISES: CPT | Mod: CQ

## 2024-04-12 NOTE — PROGRESS NOTES
"[facility]   Physical Therapy Treatment Note      Name: Carlin Madrigal  Clinic Number: [id number]    Therapy Diagnosis:   Encounter Diagnoses   Name Primary?    Decreased ROM of neck Yes    Chronic right shoulder pain     Whiplash injury to neck, subsequent encounter      Physician: Chandrakant Laughlin MD    Visit Date: 4/12/2024    Physician Orders: PT Eval and Treat: Work Hardening/Conditioning   Medical Diagnosis from Referral:   S06.6X9S (ICD-10-CM) - Subarachnoid hemorrhage following injury, with loss of consciousness, sequela   S06.0X9S (ICD-10-CM) - Concussion with loss of consciousness, sequela   S13.4XXD (ICD-10-CM) - Whiplash injury to neck, subsequent encounter   Evaluation Date: 4/8/2024  Authorization Period Expiration: 10/2/2024  Plan of Care Expiration: 5/10/2024  Visit # / Visits authorized: 3/12  PTA: 2/5    FOTO: 1/3     Time In: 9:45pm  Time Out: 10:00pm  Total Appointment Time: 70 minutes    Precautions: Standard and Diabetes     Occupation/Job title:   Job Demands:   Current Work Restrictions:   Previous work status:   Current work status:   Date last worked (if applicable):     Subjective     Pt reports: feels that he is very limited with ADLs due to his right shoulder and right sided neck pain   He was compliant with home exercise program.  Response to previous treatment: increase pain in R levator scapulae   Function: can't do any activities overhead    Pain: 4/10 right side of neck & 8/10 pain shoulder with overhead movement   Location:  right side of neck      Objective      Objective Measures updated at progress report unless specified.     Treatment     Carlin received the treatments listed below:      therapeutic exercises to develop strength, endurance, ROM, flexibility, posture, and core stabilization for 30 minutes including:  Supine chin tucks: x20 c/ 5" hold  Supine right cervical rotations: x20 c/ 5" hold  Right Levator scap stretch: " 3x30 sec  Snag right rotation: 2x10 c/ 5 sec hold  AAROM with dowel shoulder flexion: x20 c/ 5 sec hold  AAROM with dowel shoulder ER: x20 c/ 5 sec hold      manual therapy techniques for 10 minutes, including:  STM to right occiput regions, levator scap    neuromuscular re-education activities to improve: Balance, Coordination, Kinesthetic, Sense, Proprioception, and Posture for 30 minutes. The following activities were included:  Scapular retractions: x15 c/ 5 sec hold  Rows with red tb: 2x10 c/ 5 sec hold  Shoulder extensions with red tb: 2x10 c/ 5 sec hold  Right shoulder ER walkouts with yellow tb: 2x10 c/ 5 sec hold  Side lying shoulder ER: 2x10  Prone rows: 2x10 c/ 5 sec hold  Prone shoulder extensions: 2x10 c/ 5 sec hold      therapeutic activities to improve functional performance for 0 minutes, including:        Patient Education and Home Exercises       Education provided:   - Patient was educated on HEP and on all therapeutic interventions performed during today's treatment visit.     Home Exercises Provided: Patient instructed to cont prior HEP. Exercises were reviewed and Carlin was able to demonstrate them prior to the end of the session.  Carlin demonstrated good  understanding of the education provided. See EMR under Patient Instructions for exercises provided during therapy sessions    Assessment   Patient provided good to fair tolerance to therapeutic exercises as patient endorsed pain when extending excessively and with end range ER. Patient was instructed to perform all activities in pain free range. Treatment will be expanded per patient tolerance.     Patient prognosis is: Fair<>Good    Pt will continue to benefit from skilled Physical Therapy interventions in order to address the deficits listed in the problem list box on initial evaluation, provide education, and to address the musculoskeletal limitations and work-related functional deficits for their job as a .    Pt's  spiritual, cultural and educational needs considered and pt agreeable to plan of care and goals.     Anticipated barriers to physical therapy: chronicity of current injury     Goals:   LongTerm Goals: 4 weeks:      1.) Pt will become compliant and independent with the initial HEP to promote decreased pain and improved mobility and strength.   2.) Pt will become compliant and independent with an updated, progressed HEP to promote decreased pain and improved mobility and strength as well as prep for discharge from further PT intervention.   3.) Pt to report decrease in pain levels from 10/10 at worse to 3/10 at worse.   4.) Pt will improve R shoulder MMT scores by 2 muscle grade to improve functional stability and strength.   5.) Pt will demonstrate improved ability to reach over head with little to no c/o with the left shoulders in order to demonstrate improved functional shoulder mobility and self care.   6.) Pt will report 75%+ of his PLOF to demonstrate return to ADLs and community activities.   7.) Pt will demonstrate ability to lift 50lbs from floor to waist with good body mechanics within the clinic in order to simulate RTW goals.   8.) Pt will demonstrate ability to push and pull 100lbs x100 ft within the clinic in order to simulate RTW goals.    9.) Pt will demonstrate R cervical ROM WFL in order to improve ability to scan environment.      Pt will return to work full duty, full time.    Plan   Plan of care Certification: 4/8/2024 to 5/10/2024     Important to note, that the oatient may need an additional bout of PT by end of current POC based on presentation today.     Outpatient Physical Therapy 3 times weekly for 4 weeks to include the following interventions: Manual Therapy, Neuromuscular Re-ed, Patient Education, Self Care, Therapeutic Activities, and Therapeutic Exercise.        Preston Morales, PTA

## 2024-04-15 ENCOUNTER — CLINICAL SUPPORT (OUTPATIENT)
Dept: REHABILITATION | Facility: HOSPITAL | Age: 69
End: 2024-04-15
Payer: OTHER MISCELLANEOUS

## 2024-04-15 DIAGNOSIS — M25.511 CHRONIC RIGHT SHOULDER PAIN: Primary | ICD-10-CM

## 2024-04-15 DIAGNOSIS — G89.29 CHRONIC RIGHT SHOULDER PAIN: Primary | ICD-10-CM

## 2024-04-15 PROCEDURE — 97140 MANUAL THERAPY 1/> REGIONS: CPT

## 2024-04-15 PROCEDURE — 97112 NEUROMUSCULAR REEDUCATION: CPT

## 2024-04-15 PROCEDURE — 97110 THERAPEUTIC EXERCISES: CPT

## 2024-04-15 NOTE — PROGRESS NOTES
"[facility]   Physical Therapy Treatment Note      Name: Carlin Madrigal  Clinic Number: [id number]    Therapy Diagnosis:   Encounter Diagnosis   Name Primary?    Chronic right shoulder pain Yes     Physician: Chandrakant Laughlin MD    Visit Date: 4/15/2024    Physician Orders: PT Eval and Treat: Work Hardening/Conditioning   Medical Diagnosis from Referral:   S06.6X9S (ICD-10-CM) - Subarachnoid hemorrhage following injury, with loss of consciousness, sequela   S06.0X9S (ICD-10-CM) - Concussion with loss of consciousness, sequela   S13.4XXD (ICD-10-CM) - Whiplash injury to neck, subsequent encounter   Evaluation Date: 4/8/2024  Authorization Period Expiration: 10/2/2024  Plan of Care Expiration: 5/10/2024  Visit # / Visits authorized: 4/12  PTA: 0/5    FOTO: 1/3     Time In: 10:02am  Time Out: 11:05am  Total Appointment Time: 63 minutes    Precautions: Standard and Diabetes     Reports out of work and has not returned since June of last year.   Company: [employer]   Job title:  - primarily just doing framing work    Subjective     Pt reports: ongoing chief c/o right sided neck pain with limited rotation to the right as well as right shoulder pain limiting him from reaching overhead or carrying, lifting anything heavy.    He was compliant with home exercise program.  Response to previous treatment: increase pain in R levator scapulae   Function: can't do any activities overhead    Pain: 4/10 right side of neck & 8/10 pain shoulder with overhead movement   Location:  right side of neck      Objective      Objective Measures updated at progress report unless specified.   47* R rotation on eval vs 63*     Treatment     Carlin received the treatments listed below:      therapeutic exercises to develop strength, endurance, ROM, flexibility, posture, and core stabilization for 25 minutes including:  Supine chin tucks: x20 c/ 5" hold  Supine right cervical rotations: x20 c/ 5" " hold  Right Levator scap stretch: 3x30 sec  Snag right rotation: 2x10 c/ 5 sec hold  AAROM with dowel shoulder flexion: x20 c/ 5 sec hold  AAROM with dowel shoulder ER: x20 c/ 5 sec hold    manual therapy techniques for 8 minutes, including:  STM to right occiput regions, levator scap  R cervical rotational MWM 2x10, cervical distraction (next session with belt)    neuromuscular re-education activities to improve: Balance, Coordination, Kinesthetic, Sense, Proprioception, and Posture for 30 minutes. The following activities were included:  Scapular retractions: x15 c/ 5 sec hold  Rows with red tb: 2x10 c/ 5 sec hold  Shoulder extensions with red tb: 2x10 c/ 5 sec hold  Right shoulder ER walkouts with yellow tb: 2x10 c/ 5 sec hold  Side lying shoulder ER: 2x10  Prone rows: 2x10 c/ 5 sec hold  Prone shoulder extensions: 2x10 c/ 5 sec hold    therapeutic activities to improve functional performance for 0 minutes, including:  Planned for work simulation       Patient Education and Home Exercises       Education provided:   - Patient was educated on HEP and on all therapeutic interventions performed during today's treatment visit.     Home Exercises Provided: Patient instructed to cont prior HEP. Exercises were reviewed and Carlin was able to demonstrate them prior to the end of the session.  Carlin demonstrated good  understanding of the education provided. See EMR under Patient Instructions for exercises provided during therapy sessions    Assessment   Patient with ongoing chief c/o R shoulder pain with overhead activities as well as inability to lift anything heavy as well as right sided neck pain. Pt notes having slightly more stiffness this morning with cervical rotation to the right. Retested after manual interventions this morning and pt is actually with marked improved rotational ROM from 47* on eval to 63* currently. Otherwise, he provided good to fair tolerance to therapeutic exercises as patient endorsed R  shoulder pain with flexion and scaption over 90* as well as with end range ER. Patient was instructed to perform all activities in pain free range. Treatment will be expanded per patient tolerance last visit and kept relatively same overall today with exception of changes to MT. Plan to continue to progress load as pt is able to tolerate.      Patient prognosis is: Fair<>Good    Pt will continue to benefit from skilled Physical Therapy interventions in order to address the deficits listed in the problem list box on initial evaluation, provide education, and to address the musculoskeletal limitations and work-related functional deficits for their job as a .    Pt's spiritual, cultural and educational needs considered and pt agreeable to plan of care and goals.     Anticipated barriers to physical therapy: chronicity of current injury     Goals:   LongTerm Goals: 4 weeks:      1.) Pt will become compliant and independent with the initial HEP to promote decreased pain and improved mobility and strength. MET  2.) Pt will become compliant and independent with an updated, progressed HEP to promote decreased pain and improved mobility and strength as well as prep for discharge from further PT intervention.   3.) Pt to report decrease in pain levels from 10/10 at worse to 3/10 at worse.   4.) Pt will improve R shoulder MMT scores by 2 muscle grade to improve functional stability and strength.   5.) Pt will demonstrate improved ability to reach over head with little to no c/o with the left shoulders in order to demonstrate improved functional shoulder mobility and self care.   6.) Pt will report 75%+ of his PLOF to demonstrate return to ADLs and community activities.   7.) Pt will demonstrate ability to lift 50lbs from floor to waist with good body mechanics within the clinic in order to simulate RTW goals.   8.) Pt will demonstrate ability to push and pull 100lbs x100 ft within the clinic in order to  simulate RTW goals.    9.) Pt will demonstrate R cervical ROM WFL in order to improve ability to scan environment.      Pt will return to work full duty, full time.    Plan   Plan of care Certification: 4/8/2024 to 5/10/2024     Important to note, that the oatient may need an additional bout of PT by end of current POC based on presentation today.     Outpatient Physical Therapy 3 times weekly for 4 weeks to include the following interventions: Manual Therapy, Neuromuscular Re-ed, Patient Education, Self Care, Therapeutic Activities, and Therapeutic Exercise.        Yaron Celis, PT

## 2024-04-19 ENCOUNTER — CLINICAL SUPPORT (OUTPATIENT)
Dept: REHABILITATION | Facility: HOSPITAL | Age: 69
End: 2024-04-19
Payer: OTHER MISCELLANEOUS

## 2024-04-19 DIAGNOSIS — M25.511 CHRONIC RIGHT SHOULDER PAIN: Primary | ICD-10-CM

## 2024-04-19 DIAGNOSIS — G89.29 CHRONIC RIGHT SHOULDER PAIN: Primary | ICD-10-CM

## 2024-04-19 PROCEDURE — 97110 THERAPEUTIC EXERCISES: CPT | Mod: CQ

## 2024-04-19 PROCEDURE — 97140 MANUAL THERAPY 1/> REGIONS: CPT | Mod: CQ

## 2024-04-19 PROCEDURE — 97112 NEUROMUSCULAR REEDUCATION: CPT | Mod: CQ

## 2024-04-19 NOTE — PROGRESS NOTES
[facility]   Physical Therapy Treatment Note      Name: Carlin Madrigal  Clinic Number: [id number]    Therapy Diagnosis:   Encounter Diagnosis   Name Primary?    Chronic right shoulder pain Yes     Physician: Chandrakant Laughlin MD    Visit Date: 4/19/2024    Physician Orders: PT Eval and Treat: Work Hardening/Conditioning   Medical Diagnosis from Referral:   S06.6X9S (ICD-10-CM) - Subarachnoid hemorrhage following injury, with loss of consciousness, sequela   S06.0X9S (ICD-10-CM) - Concussion with loss of consciousness, sequela   S13.4XXD (ICD-10-CM) - Whiplash injury to neck, subsequent encounter   Evaluation Date: 4/8/2024  Authorization Period Expiration: 10/2/2024  Plan of Care Expiration: 5/10/2024  Visit # / Visits authorized: 5/12  PTA: 1/5    FOTO: 1/3     Time In: 8:45am  Time Out: 10:00am  Total Appointment Time: 75 minutes    Precautions: Standard and Diabetes     Reports out of work and has not returned since June of last year.   Company: [employer]   Job title:  - primarily just doing framing work    Subjective     Pt reports: ongoing chief c/o right sided neck pain with limited rotation to the right as well as right shoulder pain limiting him from reaching overhead or carrying, lifting anything heavy but over all a significant improvement since start of care.  He was compliant with home exercise program.  Response to previous treatment: increase pain in R levator scapulae   Function: but still can't do any activities overhead    Pain: 2/10 right side of neck if turn to the right & 3/10 pain shoulder with overhead movement   Location:  right side of neck      Objective      Objective Measures updated at progress report unless specified.   47* R rotation on eval vs 63*     Treatment     Carlin received the treatments listed below:      therapeutic exercises to develop strength, endurance, ROM, flexibility, posture, and core stabilization for 30 minutes  "including:  Supine chin tucks: x20 c/ 5" hold  Supine right cervical rotations: x20 c/ 5" hold  Right Levator scap stretch: 3x30 sec  SNAG right rotation: 2x10 c/ 5 sec hold  SNAG cervical extensions: 1x10 c/5 sec hold  AAROM with dowel shoulder flexion: x20 c/ 5 sec hold  AAROM with dowel shoulder ER: x20 c/ 5 sec hold    manual therapy techniques for 15 minutes, including:  STM to right occiput regions, levator scap  R cervical rotational MWM 2x10, cervical distraction (next session with belt)    neuromuscular re-education activities to improve: Balance, Coordination, Kinesthetic, Sense, Proprioception, and Posture for 30 minutes. The following activities were included:  Scapular retractions: x15 c/ 5 sec hold  Rows with green tb: 2x10 c/ 5 sec hold  Shoulder extensions with green red tb: 2x10 c/ 5 sec hold  Right shoulder ER walkouts with red tb: 2x10 c/ 5 sec hold  Side lying shoulder ER, 1#: 3x10  Prone rows: 2x10 c/ 5 sec hold  +Bent over rows, 12# kb: 2x10  Prone shoulder extensions: 2x10 c/ 5 sec hold    therapeutic activities to improve functional performance for 0 minutes, including:  Planned for work simulation       Patient Education and Home Exercises       Education provided:   - Patient was educated on HEP and on all therapeutic interventions performed during today's treatment visit.     Home Exercises Provided: Yes & Patient instructed to cont prior HEP. Exercises were reviewed and Carlin was able to demonstrate them prior to the end of the session.  Carlin demonstrated good  understanding of the education provided. See EMR under Patient Instructions for exercises provided during therapy sessions    Assessment     Patient continues to have right shoulder pain with overhead activities as well as inability to lift anything heavy as well as right sided neck pain. Pt notes, however, that he is progressing as pain with overhead activity is gradually decreasing. Patient adds that right sided neck pain " with right rotation is also decreasing gradually. Patient provided good tolerance to therapeutic exercises, keeping in mind that patient was instructed to keep range of motion in pain free range with all therapeutic exercises. Treatment was expanded during today's visit in order to promote periscapular/RC stability. Plan to continue to progress load as pt is able to tolerate.      Patient prognosis is: Fair<>Good    Pt will continue to benefit from skilled Physical Therapy interventions in order to address the deficits listed in the problem list box on initial evaluation, provide education, and to address the musculoskeletal limitations and work-related functional deficits for their job as a .    Pt's spiritual, cultural and educational needs considered and pt agreeable to plan of care and goals.     Anticipated barriers to physical therapy: chronicity of current injury     Goals:   LongTerm Goals: 4 weeks:      1.) Pt will become compliant and independent with the initial HEP to promote decreased pain and improved mobility and strength. MET  2.) Pt will become compliant and independent with an updated, progressed HEP to promote decreased pain and improved mobility and strength as well as prep for discharge from further PT intervention.   3.) Pt to report decrease in pain levels from 10/10 at worse to 3/10 at worse.   4.) Pt will improve R shoulder MMT scores by 2 muscle grade to improve functional stability and strength.   5.) Pt will demonstrate improved ability to reach over head with little to no c/o with the left shoulders in order to demonstrate improved functional shoulder mobility and self care.   6.) Pt will report 75%+ of his PLOF to demonstrate return to ADLs and community activities.   7.) Pt will demonstrate ability to lift 50lbs from floor to waist with good body mechanics within the clinic in order to simulate RTW goals.   8.) Pt will demonstrate ability to push and pull 100lbs x100  ft within the clinic in order to simulate RTW goals.    9.) Pt will demonstrate R cervical ROM WFL in order to improve ability to scan environment.      Pt will return to work full duty, full time.    Plan   Plan of care Certification: 4/8/2024 to 5/10/2024     Important to note, that the oatient may need an additional bout of PT by end of current POC based on presentation today.     Outpatient Physical Therapy 3 times weekly for 4 weeks to include the following interventions: Manual Therapy, Neuromuscular Re-ed, Patient Education, Self Care, Therapeutic Activities, and Therapeutic Exercise.        Preston Morales, PTA

## 2024-04-22 ENCOUNTER — CLINICAL SUPPORT (OUTPATIENT)
Dept: REHABILITATION | Facility: HOSPITAL | Age: 69
End: 2024-04-22
Payer: OTHER MISCELLANEOUS

## 2024-04-22 DIAGNOSIS — M25.511 CHRONIC RIGHT SHOULDER PAIN: Primary | ICD-10-CM

## 2024-04-22 DIAGNOSIS — G89.29 CHRONIC RIGHT SHOULDER PAIN: Primary | ICD-10-CM

## 2024-04-22 PROCEDURE — 97140 MANUAL THERAPY 1/> REGIONS: CPT

## 2024-04-22 PROCEDURE — 97110 THERAPEUTIC EXERCISES: CPT

## 2024-04-22 PROCEDURE — 97112 NEUROMUSCULAR REEDUCATION: CPT

## 2024-04-24 ENCOUNTER — OFFICE VISIT (OUTPATIENT)
Dept: NEUROLOGY | Facility: CLINIC | Age: 69
End: 2024-04-24
Payer: OTHER MISCELLANEOUS

## 2024-04-24 ENCOUNTER — CLINICAL SUPPORT (OUTPATIENT)
Dept: REHABILITATION | Facility: HOSPITAL | Age: 69
End: 2024-04-24
Payer: OTHER MISCELLANEOUS

## 2024-04-24 VITALS
DIASTOLIC BLOOD PRESSURE: 67 MMHG | SYSTOLIC BLOOD PRESSURE: 119 MMHG | BODY MASS INDEX: 28.2 KG/M2 | HEART RATE: 70 BPM | WEIGHT: 202.19 LBS

## 2024-04-24 DIAGNOSIS — M25.511 CHRONIC RIGHT SHOULDER PAIN: Primary | ICD-10-CM

## 2024-04-24 DIAGNOSIS — G44.86 CERVICOGENIC HEADACHE: ICD-10-CM

## 2024-04-24 DIAGNOSIS — G89.29 CHRONIC RIGHT SHOULDER PAIN: ICD-10-CM

## 2024-04-24 DIAGNOSIS — M25.511 CHRONIC RIGHT SHOULDER PAIN: ICD-10-CM

## 2024-04-24 DIAGNOSIS — S13.4XXA WHIPLASH INJURY TO NECK, INITIAL ENCOUNTER: ICD-10-CM

## 2024-04-24 DIAGNOSIS — G89.29 CHRONIC RIGHT SHOULDER PAIN: Primary | ICD-10-CM

## 2024-04-24 DIAGNOSIS — S06.6X9S SUBARACHNOID HEMORRHAGE FOLLOWING INJURY, WITH LOSS OF CONSCIOUSNESS, SEQUELA: ICD-10-CM

## 2024-04-24 DIAGNOSIS — S06.0X9S CONCUSSION WITH LOSS OF CONSCIOUSNESS, SEQUELA: Primary | ICD-10-CM

## 2024-04-24 DIAGNOSIS — M54.2 CERVICALGIA OF OCCIPITO-ATLANTO-AXIAL REGION: ICD-10-CM

## 2024-04-24 PROCEDURE — 97110 THERAPEUTIC EXERCISES: CPT | Mod: CQ

## 2024-04-24 PROCEDURE — 99214 OFFICE O/P EST MOD 30 MIN: CPT | Mod: S$GLB,,, | Performed by: PSYCHIATRY & NEUROLOGY

## 2024-04-24 PROCEDURE — 97112 NEUROMUSCULAR REEDUCATION: CPT | Mod: CQ

## 2024-04-24 PROCEDURE — 99999 PR PBB SHADOW E&M-EST. PATIENT-LVL III: CPT | Mod: PBBFAC,,, | Performed by: PSYCHIATRY & NEUROLOGY

## 2024-04-24 PROCEDURE — 97140 MANUAL THERAPY 1/> REGIONS: CPT | Mod: CQ

## 2024-04-24 NOTE — PROGRESS NOTES
[facility]   Physical Therapy Treatment Note      Name: Carlin Madrigal  Clinic Number: [id number]    Therapy Diagnosis:   Encounter Diagnosis   Name Primary?    Chronic right shoulder pain Yes     Physician: Chandrakant Laughlin MD    Visit Date: 4/24/2024    Physician Orders: PT Eval and Treat: Work Hardening/Conditioning   Medical Diagnosis from Referral:   S06.6X9S (ICD-10-CM) - Subarachnoid hemorrhage following injury, with loss of consciousness, sequela   S06.0X9S (ICD-10-CM) - Concussion with loss of consciousness, sequela   S13.4XXD (ICD-10-CM) - Whiplash injury to neck, subsequent encounter   Evaluation Date: 4/8/2024  Authorization Period Expiration: 10/2/2024  Plan of Care Expiration: 5/10/2024  Visit # / Visits authorized: 6/12  PTA: 0/5    FOTO: 1/3     Time In: 8:00am  Time Out: 9:15am  Total Appointment Time: 75 minutes    Precautions: Standard and Diabetes     Reports out of work and has not returned since June of last year.   Company: [employer]   Job title:  - primarily just doing framing work    Subjective     Pt reports: reports overall feeling R shoulder pain decreasing over time; however, still having ongoing issues when reaching up overhead. Also reports ongoing stiffness with looking to the right all the way. Reports feeling he is having more motion when looking to the right, just still stiff and a little painful at end range of motion.   He was compliant with home exercise program.  Response to previous treatment: increased stiffness cervical spine post  Function: but still can't do any activities overhead    Pain: 2/10 right side of neck if turn to the right & 3/10 pain shoulder with overhead movement   Location:  right side of neck      Objective      Objective Measures updated at progress report unless specified.   47* R rotation on eval vs 63* last week vs today post MT 71* though still stiffness at end ROM    Treatment     Carlin received the  "treatments listed below:      therapeutic exercises to develop strength, endurance, ROM, flexibility, posture, and core stabilization for 40 minutes including:  Supine chin tucks: x20 c/ 5" hold  Supine right cervical rotations: x20 c/ 5" hold  Right Levator scap stretch: 3x30 sec  Right upper trap stretch: 3x30 sec  SNAG right rotation: 2x10 c/ 5 sec hold  SNAG cervical extensions: 1x10 c/5 sec hold  AAROM with dowel shoulder flexion: x20 c/ 5 sec hold  AAROM with dowel shoulder ER: x20 c/ 5 sec hold  Pulleys flexion 2 min    manual therapy techniques for 15 minutes, including:  STM to right occiput regions, levator scap  R cervical rotational MWM 2x10, contract relax into R rotation x10 progressing ROM; cervical distraction with belt 30" x4    neuromuscular re-education activities to improve: Balance, Coordination, Kinesthetic, Sense, Proprioception, and Posture for 35 minutes. The following activities were included:  Rows with green tb: 2x10 c/ 5 sec hold  Shoulder extensions with green red tb: 2x10 c/ 5 sec hold  Bent over rows, 12# kb: 3x8  Side lying shoulder abduction 1#: 3x8  Side lying shoulder Hx abduction 1#: 3x10  Side lying shoulder ER, 2#: 3x10  Prone shoulder extensions: 2x10 c/ 5 sec hold  Right shoulder ER walkouts with red tb: 3x10 c/ 5 sec hold  +standing wall lower trap lift offs: 3x10    therapeutic activities to improve functional performance for 0 minutes, including:  Planned for work simulation       Patient Education and Home Exercises       Education provided:   - Patient was educated on HEP and on all therapeutic interventions performed during today's treatment visit.     Home Exercises Provided: Yes & Patient instructed to cont prior HEP. Exercises were reviewed and Carlin was able to demonstrate them prior to the end of the session.  Carlin demonstrated good  understanding of the education provided. See EMR under Patient Instructions for exercises provided during therapy " sessions    Assessment     Patient overall continues to progress well with PT intervention at this time endorsing increased R cervical rotational mobility and increased right shoulder stability. Though this is the case, he is still feeling cervical pain and stiffness at end range ipsilaterally. He is also progressing well in terms of his R should dysfunction. He reports pain levels are decreasing; however, still having difficulty with overhead activities. Patient provided good tolerance to therapeutic exercises, keeping in mind that patient was instructed to keep range of motion in pain free range with all therapeutic exercises. Treatment was expanded during today's visit in order to promote lower trap activation/strengthening. Plan to continue to progress load as pt is able to tolerate.      Patient prognosis is: Fair<>Good    Pt will continue to benefit from skilled Physical Therapy interventions in order to address the deficits listed in the problem list box on initial evaluation, provide education, and to address the musculoskeletal limitations and work-related functional deficits for their job as a .    Pt's spiritual, cultural and educational needs considered and pt agreeable to plan of care and goals.     Anticipated barriers to physical therapy: chronicity of current injury     Goals:   LongTerm Goals: 4 weeks:      1.) Pt will become compliant and independent with the initial HEP to promote decreased pain and improved mobility and strength. MET  2.) Pt will become compliant and independent with an updated, progressed HEP to promote decreased pain and improved mobility and strength as well as prep for discharge from further PT intervention.   3.) Pt to report decrease in pain levels from 10/10 at worse to 3/10 at worse.   4.) Pt will improve R shoulder MMT scores by 2 muscle grade to improve functional stability and strength.   5.) Pt will demonstrate improved ability to reach over head  with little to no c/o with the left shoulders in order to demonstrate improved functional shoulder mobility and self care.   6.) Pt will report 75%+ of his PLOF to demonstrate return to ADLs and community activities.   7.) Pt will demonstrate ability to lift 50lbs from floor to waist with good body mechanics within the clinic in order to simulate RTW goals.   8.) Pt will demonstrate ability to push and pull 100lbs x100 ft within the clinic in order to simulate RTW goals.    9.) Pt will demonstrate R cervical ROM WFL in order to improve ability to scan environment.      Pt will return to work full duty, full time.    Plan   Plan of care Certification: 4/8/2024 to 5/10/2024     Important to note, that the oatient may need an additional bout of PT by end of current POC based on presentation today.     Outpatient Physical Therapy 3 times weekly for 4 weeks to include the following interventions: Manual Therapy, Neuromuscular Re-ed, Patient Education, Self Care, Therapeutic Activities, and Therapeutic Exercise.        Preston Morales, PTA

## 2024-04-24 NOTE — PROGRESS NOTES
Chief Complaint: Neck Pain    Subjective:     History of Present Illness    Referring Provider: ED  Date of Injury: 6/13/23  Accompanied by: Daughter  Workers Comp     09/11/2023: Carlin Madrigal is a 68 y.o. male with h/o DM, HTN, HLD who presents for concussion evaluation. 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 Currently, headaches are right occipital, can last all day, 2-3 times a week, pounding. Per daughter, he has described head as bloated. He has a scar on right side of neck from the above injury. He has lower neck pain that is new since above injury. He has associated phonophobia per daughter, weakness in legs, imbalance that is new since above injury, spinning that was present prior to injury that is worse since above injury. He denies photophobia, numbness, tingling, lightheadedness, N/V. He has been having dry mouth. He notes fluctuating vision from diabetes and denies vision changes since above injury. He denies changes in taste, smell, hearing, other vision changes, appetite. He denies tinnitus. Per daughter, he gets bilateral jaw pain for the last 3 weeks that is sharp that is new since above injury and has looked swollen when eating before. He denies cognitive fogginess, concentration difficulties, and memory changes. He is sleeping well and wakes up rested. He has daytime fatigue. He snores and denies it worsening since above injury and denies apnea. He denies positional component but headache worsens when lays on right side and vasal vagal maneuvers do not significantly worsen headaches. He denies headaches waking him up and  does not wake up with headaches. Mood is okay and per daughter he is easily irritable since the above injury that he agrees with. He denies emotional lability. He has only tried Tylenol. He was just prescribed PT to help with headaches. He denies other prior head and neck injuries. Prior to current injury, headaches would be once every 2 weeks from being out in the sun and no associated photophobia, phonophobia, weakness, numbness, tingling, N/V, change in vision, aura and would not stop ADLs. Glucose most recently 124 and has been 160s and 180s.     Per ED note dated 6/13/23, per daughter he passed out at work today per coworkers and upon awakening could not remember where he was or what happened, he reports becoming dizzy but does not remember passing out     Per ED note dated 5/25/23, he has had 1 syncopal episode, has had intermittent blurred vision and dizziness described as lightheadedness that started 1 week ago, 1 week of right shoulder pain, and neck pain that began 2 weeks ago and denies trauma or injuries to the area and was sudden onset.     09/27/2023: Carlin Madrigal is a 68 y.o. male  with h/o DM, HTN, HLD, SAH and concussion with LOC, kinetic force injury with resultant cranio cervical trauma and occipital neuritis s/p medrol dose pack x1 who presents for follow up. On last visit, patient was prescribed a Medrol dose pack and started on ice therapy, ergonomics, and exercise restrictions and referred for vestibular PT and not to start neck PT and counseled on neuropathy precautions. In the interim, stopped Medrol as glucose went > 200. Offered patient translational services if needed and he declines at this time and let him know it is a free service should he choose to need it during the visit. He feels better compared to when injury first happened. Headaches are 1-3 times a week per a calendar he shows me, right occipital, cannot describe pain quality, lasts whole day but not into next day. He denies  neck pain. He describes generalized weakness. He denies photophobia, phonophobia, numbness, tingling, dizziness, N/V. He thinks he has difficulties seeing from diabetes and denies changes from his injury. He is sleeping well and wakes up feeling well. Mood is okay. He stopped taking Medrol on day 3 of the pack as his glucose was going > 200 and denies side effects. He is icing. He has not heard about vestibular PT. He is not doing neck PT. He is following neuropathy precautions.      11/08/2023: Carlin Madrigal is a 68 y.o. male with h/o DM, HTN, HLD, SAH and concussion with LOC, kinetic force injury with resultant cranio cervical trauma and occipital neuritis s/p medrol dose pack x1 who presents for follow up. On last visit, patient was referred for lower neck/upper back to mid back PT with myofascial release and continued on ice therapy, ergonomics, and exercise restrictions and referred for vestibular PT and counseled on neuropathy precautions. Offered patient translational services if needed and he declines at this time and let him know it is a free service should he choose to need it during the visit. He states that his head does not hurt but describes pins and needles sensation in right occipital region that can happen multiple times a day or not at all and feels it when turns head to the right and lasts 3-4 minutes. He denies neck pain. He denies photophobia, phonophobia, numbness, tingling, N/V, dizziness. He describes difficulties with far vision that he thinks may be from his diabetes. He describes generalized weakness and describes will get cramp in right posterior leg when tries to lift something. He is sleeping well and wakes up rested. Mood is good. He is doing neck PT and is helping and is doing vestibular PT that is helping but still feels imbalance.  He is following neuropathy precautions. He is not icing. He describes bilateral jaw pain and states jaw will get swollen when tries to chew. He states he  has upcoming appointment with dentist. He has new feeling that smells are staying in his nose.     01/11/2024: Carlin Madrigal is a 68 y.o. male with h/o DM, HTN, HLD, SAH and concussion with LOC, kinetic force injury with resultant cranio cervical trauma and occipital neuritis s/p medrol dose pack x1 who presents for follow up. On last visit, patient was continued on lower neck/upper back to mid back PT with myofascial release, ice therapy, ergonomics, and exercise restrictions and vestibular PT and counseled on neuropathy precautions. Offered patient translational services if needed and he declines at this time and let him know it is a free service should he choose to need it during the visit. He states he is doing well. He has slight pain in right base of skull, near daily for this week, 10 mins, triggered when turns head to the right, bothersome pain is best pain description, denies other headaches. He denies neck pain. He has right shoulder pain and sometimes has right hand weakness as a result. He states someone is trying to get him a shoulder specialist but he has not heard from one and is agreeable to a referral from me.  He denies photophobia, phonophobia, numbness, tingling, dizziness, N/V, change in vision. He is sleeping well and right lateral shoulder pain will wake him up and wakes up ready to go. Mood is good. He finished neck PT and does home exercises and PT helped. He is not needing to ice. He does vestibular PT exercises at home as he finished it and helped him. He is following neuropathy precautions. He denies side effects to gabapentin he is on and is taking 300 mg QAM and QPM only.     03/13/2024: Carlin Madrigal is a 69 y.o. male with h/o DM, HTN, HLD, SAH and concussion with LOC, kinetic force injury with resultant cranio cervical trauma and occipital neuritis s/p medrol dose pack x1 who presents for follow up. On last visit, patient was continued on neck exercises, ice therapy and to increase  physical activity as tolerated and continued vestibular PT exercises and counseled on neuropathy precautions and referred for ortho and increased gabapentin. Offered patient translational services if needed and he declines at this time and let him know it is a free service should he choose to need it during the visit. He indicates he is still having right upper cervical paraspinal pain that can radiate down right side of neck. He denies headaches. He states when he has neck pain he has to go lay down. He has generalized weakness. He still has right shoulder pain with numbness in right shoulder. He states he will sometimes have vision problems due to his diabetes. He denies photophobia, phonophobia, tingling, N/V, dizziness. He is sleeping well but his right shoulder will bother him and wakes up rested. Mood is good. He is doing neck exercises. He is doing vestibular PT exercises. He is following neuropathy precautions. He states increased gabapentin is helping and denies side effects. He has appointment for ortho and per daughter workers comp denied coverage for it. Per daughter, 1 month ago he was driving he could not turn his neck and did not know where he was and had to pull over and took 20 minutes before he could go back home. 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 Per he and daughter, head feels heavy and he indicates heaviness in occipital region. Per daughter, he has been complaining of jaw pain since he got hurt and workers comp has denied jaw evaluation. Per daughter, going back to work was a failure due to getting exhausted. Per daughter, he had to be started on a new blood pressure medication because blood pressure has been elevated.    04/24/2024: Carlin Madrigal is a 69 y.o. male with h/o DM, HTN, HLD, SAH and concussion with LOC, kinetic force injury  "with resultant cranio cervical trauma and occipital neuritis s/p medrol dose pack x1 who presents for follow up. On last visit, patient was referred for lower neck/upper back to mid back PT with myofascial release and continued on ice therapy and to increase physical activity as tolerated and continued vestibular PT exercises and counseled on neuropathy precautions and referred for ortho and continued gabapentin. Offered patient translational services if needed and he declines at this time and let him know it is a free service should he choose to need it during the visit. He is doing better and neck PT is helping and is doing it right times a week. He has right sided neck pain that he feels more if turns head to the right too much. He has slight right occipital pinpoint headache, denies any pain characteristics, triggered by turning head to the right, resolves with turning neck back. He denies photophobia, phonophobia, weakness, numbness, tingling, dizziness, N/V, change in vision. He is sleeping well and wakes up rested. Mood is okay. He is not icing. He does light lifting in PT and PT is also working on right shoulder. He is doing vestibular exercises. He is following neuropathy precautions. He saw someone for his shoulder but unclear per description if that was thru his PCP or thru ortho. He is taking gabapentin and denies side effects.    Current Outpatient Medications on File Prior to Visit   Medication Sig Dispense Refill    amLODIPine (NORVASC) 5 MG tablet Take 1 tablet (5 mg total) by mouth once daily. 30 tablet 1    BD ULTRA-FINE MINI PEN NEEDLE 31 gauge x 3/16" Ndle SMARTSI Each SUB-Q Twice Daily      diclofenac sodium (VOLTAREN) 1 % Gel Apply 2 g topically 3 (three) times daily. 100 g 0    ergocalciferol (ERGOCALCIFEROL) 50,000 unit Cap Vitamin D2 1,250 mcg (50,000 unit) capsule   TAKE ONE CAPSULE BY MOUTH EVERY WEEK      gabapentin (NEURONTIN) 300 MG capsule Take 1 capsule (300 mg total) by mouth 3 " (three) times daily. 90 capsule 5    levothyroxine (SYNTHROID) 50 MCG tablet Take 50 mcg by mouth every morning.      lisinopriL (PRINIVIL,ZESTRIL) 40 MG tablet Take 40 mg by mouth Daily.      metFORMIN (GLUCOPHAGE) 1000 MG tablet Take 1,000 mg by mouth 2 (two) times a day.      NOVOLIN 70/30 U-100 INSULIN 100 unit/mL (70-30) injection Inject 15 Units into the skin 2 (two) times a day.      pravastatin (PRAVACHOL) 20 MG tablet Take 20 mg by mouth Daily.       No current facility-administered medications on file prior to visit.       Review of patient's allergies indicates:  No Known Allergies    No family history on file.    Social History     Tobacco Use    Smoking status: Former     Types: Cigarettes     Passive exposure: Never    Smokeless tobacco: Never   Substance Use Topics    Alcohol use: Not Currently     Comment: 06/13/23: occ    Drug use: Not Currently       Review of Systems  Constitutional: No fevers, no chills, no change in weight  Eye/Vision: See HPI  Ear/Nose/Mouth/Throat: See HPI; no cough, no runny nose, no sore throat  Respiratory: No shortness of breath, no problems breathing  Cardiovascular: No chest pain  Gastrointestinal: See HPI, no diarrhea, no constipation  Genitourinary: No dysuria  Musculoskeletal: See HPI  Integumentary: No skin changes  Neurologic: See HPI  Psychiatric: Denies depression, denies anxiety, denies SI and HI.  Additional System Information: (Decreased concentration.)    Objective:     Vitals:    04/24/24 0957   BP: 119/67   Pulse: 70       General: Alert and awake, Well nourished, Well groomed, No acute distress, no photophobia with 60 Hz hypersensitivity.  Eyes: Extraocular movements are intact; Normal conjunctiva; no nystagmus; Visual fields are intact bilaterally to finger counting in all cardinal directions  Neck: Supple  No Stiffness  Patient has no occipital point tenderness over the bilateral greater and lesser occipital nerve without induction of headaches with no  "jump sign and no twitch response and no referred pain: 0+   No high, medial cervical pain with lateral movement of C1 over C2 and with isometric neck flexion and extension  Fluid patient turnaround with concurrent neck movement in direction of torso movement.  Right paraspinal cervical muscle spasm present  Spine/torso exam: Spine/ torso exam is within normal limits    Neurologic Exam  no saccadic intrusions of volitional ocular smooth pursuits  no pain with sustained upgaze and convergence  no visual motion sensitivity/dizziness produced with rapid eye movements or neck movements  no convergence insufficiency with no diplopia developed > 5 " accommodation    Sensory: Negative Romberg, no falls on tandem stance    Gait: Gait WNL, Heel to toe walking WNL    Labs:    No new labs    Imaging:    No new studies    Assessment:       ICD-10-CM ICD-9-CM    1. Concussion with loss of consciousness, sequela  S06.0X9S 907.0       2. Subarachnoid hemorrhage following injury, with loss of consciousness, sequela  S06.6X9S 907.0       3. Whiplash injury to neck, initial encounter  S13.4XXA 847.0       4. Cervicalgia of htvnxlen-fzlluve-jgdyk region  M54.2 723.1       5. Cervicogenic headache  G44.86 784.0       6. Chronic right shoulder pain  M25.511 719.41     G89.29 338.29          69 y.o. male with h/o DM, HTN, HLD, SAH and concussion with LOC, kinetic force injury with resultant cranio cervical trauma and occipital neuritis s/p medrol dose pack x1 who presents for follow up. Imaging from 6/13/23 injury revealed bilateral frontal and left temporal and right parietoccipital and right cerebellar hemorrhagic contusions with small amount of thin subdural blood along the falx and tentorium with left frontal lobe SAH and small SAH in right posterior frontoparietal region. On exam, he has right cervical paraspinal spasm and on initial exam vibratory loss in fingers and distal lower extremities, positive right head thrust. We discussed " previously that he had a TBI given imaging evidence. We discussed previously based on imaging evidence, he may have problems with memory and executive functioning but he is not complaining of those functions at this time. We discussed previously exam also indicative of long fiber neuropathy that is likely 2/2 to DM and not related to his injury. Overall, his symptoms are improving and mainly muscular. We discussed previously he is describing paresthesias where he hit his head and that a crushed nerve injury may have occurred in the scalp that can take up to 2 years to fully heal or may be permanent and should the paresthesias bother him or last longer, we could do medication for it and will continue gabapentin. We discussed again how his right shoulder can be impacting his neck symptoms and exam. Will continue myofascial neck PT given improvement in symptoms.     Plan:     Continue lower neck/upper back to mid back PT with myofascial release (therapist may choose from and do a combination of: deep tissue massage, cupping, dry needling, etc). No soft tissue manipulation of suboccipital region if you start to feel worse. All joint manipulation is fine.  Please have therapy do work hardening with you  The patient was instructed to ice the occipital region for no more than 20 minutes at least once a day as needed but may repeat this as many times as they would like.   Can increase physical activity as tolerated  Continue vestibular PT exercises  You do not feel your feet well due to neuropathy. Please wear footwear with good tread at all times. This recommendation includes wearing socks with tread on them. Make sure all of your walkways, hallways, and julio are well lit at all times day and night. Use night lights to light up commonly used pathways in your home, meaning from the bedroom to the bathroom or kitchen. Make sure all of your walkways, hallways, and julio are clear of obstacles at all times. Obstacles  include decorations, rugs/mats, pet beds, shoes, and clothes. Make sure you have good hand holds to grab onto as you walk around your home.  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 Delays in recovery may cause or further worsen any underlying permanent injury that was caused by the result of the patient's injury. Delays in recovery may also cause the development of new medical conditions that will then need to be treated. The development of some forms of permanent injuries may result in nerve injuries that are refractory to initial treatment, which could result in further medical expenses as more expensive methods of treatment or prolonged treatment may be needed to treat the underlying injury. In my collective clinical experience, my care plan as documented from the initial note leads to significant recovery from injuries similar to the patient's in the majority of my other patients. Delays in recovery will prevent the patient from returning to work full time in a timely fashion. Delays in recovery caused by workers compensation for patients with similar injury have also lead to significant financial settlements for the patients should a legal dispute arise. I have counseled the patient on all of the above.  Please be advised that I do not determine MMI and will update in the assessment whether patient is improving or not, or has plateaued to the  point of permanent symptoms despite successfully following recommended treatment by myself  Please be advised that the above work status is re-evaluated each visit and that the nature of the patient's injury described in the assessment and diagnoses do not have a standard or expected timeline for recovery as demonstrated in multiple up to date peer-reviewed publications  Please be advised that I do not perform FCEs. I will also not answer or comment on any FCE questions.  Please be advised I will not fill out additional paperwork that asks me to restate diagnoses, plan of care, work status, and/or accommodations as these are all documented in my clinic note.  Please be advised I will not fill out paperwork asking me to agree with a workers compensation representative's interpretation of my plan of care and/or evaluation of the patient as I stand by what is documented in my clinic note.  Please be advised that any peer to peer requests made on the behalf of workers compensation will need to be scheduled based on my availability and will be completed by myself within 1 week of the request. My clinic is not capable of doing on demand peer to peer requests with less than 48 hours of notice.     Chandrakant Laughlin MD  [facility]

## 2024-04-24 NOTE — PATIENT INSTRUCTIONS
Continue lower neck/upper back to mid back PT with myofascial release (therapist may choose from and do a combination of: deep tissue massage, cupping, dry needling, etc). No soft tissue manipulation of suboccipital region if you start to feel worse. All joint manipulation is fine.  Please have therapy do work hardening with you  The patient was instructed to ice the occipital region for no more than 20 minutes at least once a day as needed but may repeat this as many times as they would like.   Can increase physical activity as tolerated  Continue vestibular PT exercises  You do not feel your feet well due to neuropathy. Please wear footwear with good tread at all times. This recommendation includes wearing socks with tread on them. Make sure all of your walkways, hallways, and julio are well lit at all times day and night. Use night lights to light up commonly used pathways in your home, meaning from the bedroom to the bathroom or kitchen. Make sure all of your walkways, hallways, and julio are clear of obstacles at all times. Obstacles include decorations, rugs/mats, pet beds, shoes, and clothes. Make sure you have good hand holds to grab onto as you walk around your home.  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 Delays in recovery may cause or further worsen any underlying permanent injury that was caused by the result of the patient's  injury. Delays in recovery may also cause the development of new medical conditions that will then need to be treated. The development of some forms of permanent injuries may result in nerve injuries that are refractory to initial treatment, which could result in further medical expenses as more expensive methods of treatment or prolonged treatment may be needed to treat the underlying injury. In my collective clinical experience, my care plan as documented from the initial note leads to significant recovery from injuries similar to the patient's in the majority of my other patients. Delays in recovery will prevent the patient from returning to work full time in a timely fashion. Delays in recovery caused by workers compensation for patients with similar injury have also lead to significant financial settlements for the patients should a legal dispute arise. I have counseled the patient on all of the above.  Please be advised that I do not determine MMI and will update in the assessment whether patient is improving or not, or has plateaued to the point of permanent symptoms despite successfully following recommended treatment by myself  Please be advised that the above work status is re-evaluated each visit and that the nature of the patient's injury described in the assessment and diagnoses do not have a standard or expected timeline for recovery as demonstrated in multiple up to date peer-reviewed publications  Please be advised that I do not perform FCEs. I will also not answer or comment on any FCE questions.  Please be advised I will not fill out additional paperwork that asks me to restate diagnoses, plan of care, work status, and/or accommodations as these are all documented in my clinic note.  Please be advised I will not fill out paperwork asking me to agree with a workers compensation representative's interpretation of my plan of care and/or evaluation of the patient as I stand by what is documented in my  clinic note.  Please be advised that any peer to peer requests made on the behalf of workers compensation will need to be scheduled based on my availability and will be completed by myself within 1 week of the request. My clinic is not capable of doing on demand peer to peer requests with less than 48 hours of notice.

## 2024-04-26 ENCOUNTER — CLINICAL SUPPORT (OUTPATIENT)
Dept: REHABILITATION | Facility: HOSPITAL | Age: 69
End: 2024-04-26
Payer: OTHER MISCELLANEOUS

## 2024-04-26 DIAGNOSIS — G89.29 CHRONIC RIGHT SHOULDER PAIN: Primary | ICD-10-CM

## 2024-04-26 DIAGNOSIS — M25.511 CHRONIC RIGHT SHOULDER PAIN: Primary | ICD-10-CM

## 2024-04-26 PROCEDURE — 97110 THERAPEUTIC EXERCISES: CPT | Mod: CQ

## 2024-04-26 PROCEDURE — 97112 NEUROMUSCULAR REEDUCATION: CPT | Mod: CQ

## 2024-04-26 PROCEDURE — 97140 MANUAL THERAPY 1/> REGIONS: CPT | Mod: CQ

## 2024-04-26 NOTE — PROGRESS NOTES
[facility]   Physical Therapy Treatment Note      Name: Carlin Madrigal  Clinic Number: [id number]    Therapy Diagnosis:   Encounter Diagnosis   Name Primary?    Chronic right shoulder pain Yes     Physician: Chandrakant Laughlin MD    Visit Date: 4/26/2024    Physician Orders: PT Eval and Treat: Work Hardening/Conditioning   Medical Diagnosis from Referral:   S06.6X9S (ICD-10-CM) - Subarachnoid hemorrhage following injury, with loss of consciousness, sequela   S06.0X9S (ICD-10-CM) - Concussion with loss of consciousness, sequela   S13.4XXD (ICD-10-CM) - Whiplash injury to neck, subsequent encounter   Evaluation Date: 4/8/2024  Authorization Period Expiration: 10/2/2024  Plan of Care Expiration: 5/10/2024  Visit # / Visits authorized: 7/12  PTA: 1/5    FOTO: 1/3     Time In: 8:05 am  Time Out: 9:30 am  Total Appointment Time: 85 minutes    Precautions: Standard and Diabetes     Reports out of work and has not returned since June of last year.   Company: [employer]   Job title:  - primarily just doing framing work    Subjective     Pt reports: overall feeling R shoulder pain decreasing over time; however, still having ongoing issues when reaching up overhead. Reports feeling he is having more motion when looking to the right, just still stiff and a little painful at end range of motion.   He was compliant with home exercise program.  Response to previous treatment: increased stiffness cervical spine post  Function: activities overhead are a little difficult due to shoulder pain    Pain: 2/10 right side of neck if turn to the right & 3/10 pain shoulder with overhead movement   Location:  right side of neck      Objective      Objective Measures updated at progress report unless specified.   47* R rotation on eval vs 63* last week vs today post MT 71* though still stiffness at end ROM    Treatment     Carlin received the treatments listed below:      therapeutic exercises to  "develop strength, endurance, ROM, flexibility, posture, and core stabilization for 35 minutes including:  Supine chin tucks: x20 c/ 5" hold  Supine right cervical rotations: x20 c/ 5" hold  Right Levator scap stretch: 3x30 sec  Right upper trap stretch: 3x30 sec  SNAG right rotation: 2x10 c/ 5 sec hold  SNAG cervical extensions: 1x10 c/5 sec hold  AAROM with dowel shoulder flexion: x20 c/ 5 sec hold  AAROM with dowel shoulder ER: x20 c/ 5 sec hold  Pulleys flexion 2 min    manual therapy techniques for 10 minutes, including:  STM to right occiput regions, levator scap  R cervical rotational MWM 2x10, contract relax into R rotation x10 progressing ROM; cervical distraction with belt 30" x4    neuromuscular re-education activities to improve: Balance, Coordination, Kinesthetic, Sense, Proprioception, and Posture for 40 minutes. The following activities were included:  Rows with green tb: 3x10 c/ 5 sec hold  Shoulder extensions with red tb: 3x10 c/ 5 sec hold  Bent over rows, 12# kb: 3x8  standing wall lower trap lift offs: 3x10  Prone shoulder extensions: 2x10 c/ 5 sec hold  Side lying shoulder abduction 1#: 3x8  Side lying shoulder Hx abduction 1#: 3x10  Right shoulder ER walkouts + uppercut at end of each walkout with yellow tb: 3x10 c/ 5 sec hold  Side lying shoulder ER, 2#: 3x10  +Serratus wall slides with roller + yellow loop band around wrists: 2x10    therapeutic activities to improve functional performance for 0 minutes, including:  Planned for work simulation       Patient Education and Home Exercises       Education provided:   - Patient was educated on HEP and on all therapeutic interventions performed during today's treatment visit.     Home Exercises Provided: Yes & Patient instructed to cont prior HEP. Exercises were reviewed and Carlin was able to demonstrate them prior to the end of the session.  Carlin demonstrated good  understanding of the education provided. See EMR under Patient Instructions for " exercises provided during therapy sessions    Assessment   Patient overall continues to progress well with PT intervention at this time endorsing increased R cervical rotational mobility and increased right shoulder stability. Though this is the case, he is still feeling cervical pain and stiffness at end range ipsilaterally. He is also progressing well in terms of his R should dysfunction. He reports pain levels are decreasing; however, still having difficulty with overhead activities. Patient provided good tolerance to therapeutic exercises, keeping in mind that patient was instructed to keep range of motion in pain free range with all therapeutic exercises. Treatment was expanded during today's visit in order to promote periscapular stability including serratus anterior activation/strengthening. Plan to continue to progress load as pt is able to tolerate.      Patient prognosis is: Fair<>Good    Pt will continue to benefit from skilled Physical Therapy interventions in order to address the deficits listed in the problem list box on initial evaluation, provide education, and to address the musculoskeletal limitations and work-related functional deficits for their job as a .    Pt's spiritual, cultural and educational needs considered and pt agreeable to plan of care and goals.     Anticipated barriers to physical therapy: chronicity of current injury     Goals:   LongTerm Goals: 4 weeks:      1.) Pt will become compliant and independent with the initial HEP to promote decreased pain and improved mobility and strength. MET  2.) Pt will become compliant and independent with an updated, progressed HEP to promote decreased pain and improved mobility and strength as well as prep for discharge from further PT intervention.   3.) Pt to report decrease in pain levels from 10/10 at worse to 3/10 at worse.   4.) Pt will improve R shoulder MMT scores by 2 muscle grade to improve functional stability and  strength.   5.) Pt will demonstrate improved ability to reach over head with little to no c/o with the left shoulders in order to demonstrate improved functional shoulder mobility and self care.   6.) Pt will report 75%+ of his PLOF to demonstrate return to ADLs and community activities.   7.) Pt will demonstrate ability to lift 50lbs from floor to waist with good body mechanics within the clinic in order to simulate RTW goals.   8.) Pt will demonstrate ability to push and pull 100lbs x100 ft within the clinic in order to simulate RTW goals.    9.) Pt will demonstrate R cervical ROM WFL in order to improve ability to scan environment.      Pt will return to work full duty, full time.    Plan   Plan of care Certification: 4/8/2024 to 5/10/2024     Important to note, that the oatient may need an additional bout of PT by end of current POC based on presentation today.     Outpatient Physical Therapy 3 times weekly for 4 weeks to include the following interventions: Manual Therapy, Neuromuscular Re-ed, Patient Education, Self Care, Therapeutic Activities, and Therapeutic Exercise.        Preston Morales, PTA

## 2024-04-29 ENCOUNTER — CLINICAL SUPPORT (OUTPATIENT)
Dept: REHABILITATION | Facility: HOSPITAL | Age: 69
End: 2024-04-29
Payer: OTHER MISCELLANEOUS

## 2024-04-29 DIAGNOSIS — G89.29 CHRONIC RIGHT SHOULDER PAIN: Primary | ICD-10-CM

## 2024-04-29 DIAGNOSIS — M25.511 CHRONIC RIGHT SHOULDER PAIN: Primary | ICD-10-CM

## 2024-04-29 PROCEDURE — 97110 THERAPEUTIC EXERCISES: CPT

## 2024-04-29 PROCEDURE — 97112 NEUROMUSCULAR REEDUCATION: CPT

## 2024-04-29 NOTE — PROGRESS NOTES
[facility]   Physical Therapy Treatment Note; Plan of Care     Name: Carlin Madrigal  Clinic Number: [id number]    Therapy Diagnosis:   Encounter Diagnosis   Name Primary?    Chronic right shoulder pain Yes     Physician: Chandrakant Laughlin MD    Visit Date: 4/29/2024    Physician Orders: PT Eval and Treat: Work Hardening/Conditioning   Medical Diagnosis from Referral:   S06.6X9S (ICD-10-CM) - Subarachnoid hemorrhage following injury, with loss of consciousness, sequela   S06.0X9S (ICD-10-CM) - Concussion with loss of consciousness, sequela   S13.4XXD (ICD-10-CM) - Whiplash injury to neck, subsequent encounter   Evaluation Date: 4/8/2024  Authorization Period Expiration: 10/2/2024  Plan of Care Expiration: 5/10/2024  Visit # / Visits authorized: 9/12  PTA: 0/5    FOTO: 1/3     Time In: 8:00am  Time Out: 9:05 am  Total Appointment Time: 65 minutes    Precautions: Standard and Diabetes     Reports out of work and has not returned since June of last year.   Company: [employer]   Job title:  - primarily just doing framing work    Subjective     Pt reports: overall feeling R shoulder pain decreasing over time and not really having any s/s at rest or with movement below shoulder height. Still with c/o R shoulder pain with activities overhead. Also his neck is continuing to feel better and better; though still having some difficulties looking all the way to the right.   He was compliant with home exercise program.  Response to previous treatment: increased stiffness cervical spine post  Function: activities overhead are a little difficult due to shoulder pain    Pain: 2/10 right side of neck if turn to the right & 3/10 pain shoulder with overhead movement   Location:  right side of neck      Objective      Objective Measures updated at progress report unless specified.     Cervical Screening:   - Flexion 64 On eval vs currently 70  - Extension 32 with R sided neck pain end ROM  "On eval vs currently 42  - Rotation 68 L rotation vs 47 R rotation On eval vs currently 74 L rotation and 72 R rotation  - Quadrant R extension with (+) CS R sided neck s/s On eval vs currently (+)  - ttp to R suboccipital mm On eval vs currently (+)  - R  position II 50 vs L 65lbs (RHD) On eval vs currently 70lbs  - Flexion Rotation test (+) to R On eval vs currently (+) min at end ROM; cleared after MWM      Shoulder Screening:   Observations: Pt demonstrates upper crossed posture with head forward and rounded shoulders  Palpation: Pt with ttp to the R RTC region about the R Supraspinatus     Shoulder AROM Right (*) = in degrees Left (*) = in degrees   Flexion 104 On eval vs currently 148 standing and 153 Supine 115   Scaption 82 On eval vs currently 126 124   IR LSJ : functional reaching T8   ER CTJ : functional reaching 74* PROM and painful end ROM On eval vs currently On eval vs currently 80 AROM 83* max PROM limited to pain T4/5      Thoracic Screening: limited AROM extension and rotation R/L at end ranges of motion     Shoulder MMT scores Right: X/5 Left: x/5   Scaption 3+p/5 on eval vs 4-/5 currently within av ROM 4/5    IR 4/5 vs 4+/5 currently within av ROM 5/5    ER 4-p /5 vs 4p/5 currently within av ROM 4+/5      Special testing cluster exams:  RTC pathology: Painful arc, Drop arm sign, Infraspinatus: Met 2/3     Joint testing: JS w/ posterior glide GH joint    Treatment     Carlin received the treatments listed below:      therapeutic exercises to develop strength, endurance, ROM, flexibility, posture, and core stabilization for 10 minutes including:  Supine deep neck flexion x15 c/ 5" hold  Supine right cervical rotations: x20 c/ 5" hold  Right Levator scap stretch: 3x30 sec  SNAG right rotation: 2x10 c/ 5 sec hold  SNAG cervical extensions: 1x10 c/5 sec hold  AAROM with dowel shoulder flexion: x20 c/ 5 sec hold  AAROM with dowel shoulder ER: x20 c/ 5 sec hold  Pulleys flexion 2 min    manual " "therapy techniques for 10 minutes, including:  STM to right occiput regions, levator scap  R cervical rotational MWM 2x10, contract relax into R rotation x10 progressing ROM; cervical distraction with belt 30" x4  R C1/2 MET - side bend max to L, then rotate R to end ROM. Have the pt slightly rotate their head left into you hand, matching you resistance. Hold for 6 seconds, then  slack into R rotation and repeat.   Upper Cervical rotation into flexion - flex cervical spine to max flexion, turn head to R within pain free ROM. Have pt slightly rotation L into your hand, matching your resistance. Hold 6 seconds.  slack into R rotation and repeat.     neuromuscular re-education activities to improve: Balance, Coordination, Kinesthetic, Sense, Proprioception, and Posture for 40 minutes. The following activities were included:    Circuit alternating activities:   Standing R shoulder Hz shoulder abduction/scaption RTB 2x12  Standing R shoulder ER GTB 2x12    Circuit alternating activities:   Seated OH press starting from 90* shoulder scaption 2# 2x15  Bent over rows, 20# kb: 2x10    Circuit alternating activities:   Serratus wall slides with roller + yellow loop band around wrists: 2x12  Landmine press 10lb bar starting from treatment table 2x10    Rows with green tb: 3x10 c/ 5 sec hold  Shoulder extensions with red tb: 3x10 c/ 5 sec hold  Standing wall lower trap lift offs: 3x10  Prone shoulder extensions: 2x10 c/ 5 sec hold  Side lying shoulder abduction 1#: 3x8  Side lying shoulder Hx abduction 1#: 3x10  Right shoulder ER walkouts + uppercut at end of each walkout with yellow tb: 3x10 c/ 5 sec hold  Side lying shoulder ER, 2#: 3x10    therapeutic activities to improve functional performance for 0 minutes, including:  Planned for work simulation     Patient Education and Home Exercises       Education provided:   - Patient was educated on HEP and on all therapeutic interventions performed during today's " treatment visit.     Home Exercises Provided: Yes & Patient instructed to cont prior HEP. Exercises were reviewed and Carlin was able to demonstrate them prior to the end of the session.  Carlin demonstrated good  understanding of the education provided. See EMR under Patient Instructions for exercises provided during therapy sessions    Assessment     Patient overall has completed 10 of 12 authorized PT visits. He continues to demonstrate both subjective and objective improvements with both impairment level and functional measures.   Cervical - He continues to progress well with PT intervention at this time endorsing increased R cervical rotational mobility. Extension ROM has improved from 32* to 42* and L rotation has improved from 47* to 74* Ongoing minor restrictions noted upper C spine with end ROM (+) flexion/rotation test.   R shoulder - HE continues to note improvements in R shoulder ROM and reduction in pain, though ongoing chief c/o lifting overhead. R shoulder flexion ROM improved from 104* to 153* as well as scaption ROM improved from 82* to 126*. MMT scores improving within available ROM as well.   Plan to continue to progress load as pt is able to tolerate; continue to load RTC and improve upper cervical ROM.     At this time feel as though the pt would benefit from additional PT intervention and would therefore need a new PT order and subsequent authorization.     Patient prognosis is: Fair<>Good    Pt will continue to benefit from skilled Physical Therapy interventions in order to address the deficits listed in the problem list box on initial evaluation, provide education, and to address the musculoskeletal limitations and work-related functional deficits for their job as a .    Pt's spiritual, cultural and educational needs considered and pt agreeable to plan of care and goals.     Anticipated barriers to physical therapy: chronicity of current injury     Goals:   LongTerm  Goals: 4 weeks:      1.) Pt will become compliant and independent with the initial HEP to promote decreased pain and improved mobility and strength. MET  2.) Pt will become compliant and independent with an updated, progressed HEP to promote decreased pain and improved mobility and strength as well as prep for discharge from further PT intervention.   3.) Pt to report decrease in pain levels from 10/10 at worse to 3/10 at worse. Progressing Well.   4.) Pt will improve R shoulder MMT scores by 2 muscle grade to improve functional stability and strength. Progressing Well.   5.) Pt will demonstrate improved ability to reach over head with little to no c/o with the left shoulders in order to demonstrate improved functional shoulder mobility and self care. Progressing Well.   6.) Pt will report 75%+ of his PLOF to demonstrate return to ADLs and community activities. Progressing Well.   7.) Pt will demonstrate ability to lift 50lbs from floor to waist with good body mechanics within the clinic in order to simulate RTW goals. Progressing Well.   8.) Pt will demonstrate ability to push and pull 100lbs x100 ft within the clinic in order to simulate RTW goals.    9.) Pt will demonstrate R cervical ROM WFL in order to improve ability to scan environment. Progressing Well.      Pt will return to work full duty, full time.    Plan   Plan of care Certification: 4/8/2024 to 5/10/2024    Updating POC today to: 5/10/2024 to 6/28/2024    Yaron Celis, PT

## 2024-04-30 ENCOUNTER — CLINICAL SUPPORT (OUTPATIENT)
Dept: REHABILITATION | Facility: HOSPITAL | Age: 69
End: 2024-04-30
Payer: OTHER MISCELLANEOUS

## 2024-04-30 DIAGNOSIS — M25.511 CHRONIC RIGHT SHOULDER PAIN: Primary | ICD-10-CM

## 2024-04-30 DIAGNOSIS — G89.29 CHRONIC RIGHT SHOULDER PAIN: Primary | ICD-10-CM

## 2024-04-30 PROCEDURE — 97110 THERAPEUTIC EXERCISES: CPT | Mod: CQ

## 2024-04-30 PROCEDURE — 97112 NEUROMUSCULAR REEDUCATION: CPT | Mod: CQ

## 2024-04-30 NOTE — PROGRESS NOTES
[facility]   Physical Therapy Treatment Note      Name: Carlin Madrigal  Clinic Number: [id number]    Therapy Diagnosis:   Encounter Diagnosis   Name Primary?    Chronic right shoulder pain Yes     Physician: Chandrakant Laughlin MD    Visit Date: 4/30/2024    Physician Orders: PT Eval and Treat: Work Hardening/Conditioning   Medical Diagnosis from Referral:   S06.6X9S (ICD-10-CM) - Subarachnoid hemorrhage following injury, with loss of consciousness, sequela   S06.0X9S (ICD-10-CM) - Concussion with loss of consciousness, sequela   S13.4XXD (ICD-10-CM) - Whiplash injury to neck, subsequent encounter   Evaluation Date: 4/8/2024  Authorization Period Expiration: 10/2/2024  Plan of Care Expiration: 5/10/2024  Visit # / Visits authorized: 10/12  PTA: 1/5    FOTO: 1/3     Time In: 8:00am  Time Out: 9:15am  Total Appointment Time: 75 minutes    Precautions: Standard and Diabetes     Reports out of work and has not returned since June of last year.   Company: [employer]   Job title:  - primarily just doing framing work    Subjective     Pt reports: overall feeling R shoulder pain decreasing over time and not really having any s/s at rest or with movement below shoulder height. Still with c/o R shoulder pain with activities overhead. Also his neck is continuing to feel better and better; though still having some difficulties looking all the way to the right.   He was compliant with home exercise program.  Response to previous treatment: increased stiffness cervical spine post  Function: activities overhead are a little difficult due to shoulder pain    Pain: 2/10 right side of neck if turn to the right & 3/10 pain shoulder with overhead movement   Location:  right side of neck      Objective      Objective Measures updated at progress report unless specified.     Cervical Screening:   - Flexion 64 On eval vs currently 70  - Extension 32 with R sided neck pain end ROM On eval vs  "currently 42  - Rotation 68 L rotation vs 47 R rotation On eval vs currently 74 L rotation and 72 R rotation  - Quadrant R extension with (+) CS R sided neck s/s On eval vs currently (+)  - ttp to R suboccipital mm On eval vs currently (+)  - R  position II 50 vs L 65lbs (RHD) On eval vs currently 70lbs  - Flexion Rotation test (+) to R On eval vs currently (+) min at end ROM; cleared after MWM      Shoulder Screening:   Observations: Pt demonstrates upper crossed posture with head forward and rounded shoulders  Palpation: Pt with ttp to the R RTC region about the R Supraspinatus     Shoulder AROM Right (*) = in degrees Left (*) = in degrees   Flexion 104 On eval vs currently 148 standing and 153 Supine 115   Scaption 82 On eval vs currently 126 124   IR LSJ : functional reaching T8   ER CTJ : functional reaching 74* PROM and painful end ROM On eval vs currently On eval vs currently 80 AROM 83* max PROM limited to pain T4/5      Thoracic Screening: limited AROM extension and rotation R/L at end ranges of motion     Shoulder MMT scores Right: X/5 Left: x/5   Scaption 3+p/5 on eval vs 4-/5 currently within av ROM 4/5    IR 4/5 vs 4+/5 currently within av ROM 5/5    ER 4-p /5 vs 4p/5 currently within av ROM 4+/5      Special testing cluster exams:  RTC pathology: Painful arc, Drop arm sign, Infraspinatus: Met 2/3     Joint testing: JS w/ posterior glide GH joint    Treatment     Carlin received the treatments listed below:      therapeutic exercises to develop strength, endurance, ROM, flexibility, posture, and core stabilization for 35 minutes including:  Supine chin tucks: x20 c/ 5" hold  Supine right cervical rotations: x20 c/ 5" hold  Right Levator scap stretch: 3x30 sec  SNAG right rotation: 2x10 c/ 5 sec hold  SNAG cervical extensions: 1x10 c/5 sec hold  AAROM with dowel shoulder flexion: x20 c/ 5 sec hold  AAROM with dowel shoulder abduciton: x20 c/ 5 sec hold  AAROM with dowel shoulder ER: x20 c/ 5 sec " "hold  Pulleys flexion/abd: 2 min/each direction    manual therapy techniques for 0 minutes, including:  STM to right occiput regions, levator scap  R cervical rotational MWM 2x10, contract relax into R rotation x10 progressing ROM; cervical distraction with belt 30" x4  R C1/2 MET - side bend max to L, then rotate R to end ROM. Have the pt slightly rotate their head left into you hand, matching you resistance. Hold for 6 seconds, then  slack into R rotation and repeat.   Upper Cervical rotation into flexion - flex cervical spine to max flexion, turn head to R within pain free ROM. Have pt slightly rotation L into your hand, matching your resistance. Hold 6 seconds.  slack into R rotation and repeat.     neuromuscular re-education activities to improve: Balance, Coordination, Kinesthetic, Sense, Proprioception, and Posture for 40 minutes. The following activities were included:    Circuit alternating activities:   Standing R shoulder Hz shoulder abduction/scaption RTB 2x12  Standing R shoulder ER GTB 2x12    Circuit alternating activities:   Seated OH press starting from 90* shoulder scaption 2# 2x15  Bent over rows, 20# kb: 2x10    Circuit alternating activities:   Serratus wall slides with roller + yellow loop band around wrists: 2x12  Landmine press 10lb bar starting from treatment table 2x10    Rows with blue tb: 2x10 c/ 5 sec hold  Shoulder extensions with blue tb: 2x10 c/ 5 sec hold  Standing wall lower trap lift offs: 3x10  Prone shoulder extensions: 2x10 c/ 5 sec hold  Side lying shoulder abduction 1#: 3x8  Side lying shoulder Hx abduction 1#: 3x10  Right shoulder ER walkouts + uppercut at end of each walkout with yellow tb: 3x10 c/ 5 sec hold  Side lying shoulder ER, 2#: 3x10    therapeutic activities to improve functional performance for 0 minutes, including:  Planned for work simulation     Patient Education and Home Exercises       Education provided:   - Patient was educated on HEP and on " all therapeutic interventions performed during today's treatment visit.     Home Exercises Provided: Yes & Patient instructed to cont prior HEP. Exercises were reviewed and Carlin was able to demonstrate them prior to the end of the session.  Carlin demonstrated good  understanding of the education provided. See EMR under Patient Instructions for exercises provided during therapy sessions    Assessment   Patient overall continues to progress well with PT intervention at this time endorsing increased R cervical rotational mobility and increased right shoulder stability. Though this is the case, he is still feeling cervical pain and stiffness at end range ipsilaterally. He is also progressing well in terms of his R should dysfunction. He reports pain levels are decreasing; however, still having difficulty with overhead activities. Patient provided good tolerance to therapeutic exercises, keeping in mind that patient was instructed to keep range of motion in pain free range with all therapeutic exercises. Plan to continue to progress load as pt is able to tolerate.      Patient prognosis is: Fair<>Good    Pt will continue to benefit from skilled Physical Therapy interventions in order to address the deficits listed in the problem list box on initial evaluation, provide education, and to address the musculoskeletal limitations and work-related functional deficits for their job as a .    Pt's spiritual, cultural and educational needs considered and pt agreeable to plan of care and goals.     Anticipated barriers to physical therapy: chronicity of current injury     Goals:   LongTerm Goals: 4 weeks:      1.) Pt will become compliant and independent with the initial HEP to promote decreased pain and improved mobility and strength. MET  2.) Pt will become compliant and independent with an updated, progressed HEP to promote decreased pain and improved mobility and strength as well as prep for discharge  from further PT intervention.   3.) Pt to report decrease in pain levels from 10/10 at worse to 3/10 at worse.   4.) Pt will improve R shoulder MMT scores by 2 muscle grade to improve functional stability and strength.   5.) Pt will demonstrate improved ability to reach over head with little to no c/o with the left shoulders in order to demonstrate improved functional shoulder mobility and self care.   6.) Pt will report 75%+ of his PLOF to demonstrate return to ADLs and community activities.   7.) Pt will demonstrate ability to lift 50lbs from floor to waist with good body mechanics within the clinic in order to simulate RTW goals.   8.) Pt will demonstrate ability to push and pull 100lbs x100 ft within the clinic in order to simulate RTW goals.    9.) Pt will demonstrate R cervical ROM WFL in order to improve ability to scan environment.      Pt will return to work full duty, full time.    Plan   Plan of care Certification: 4/8/2024 to 5/10/2024     Important to note, that the oatient may need an additional bout of PT by end of current POC based on presentation today.     Outpatient Physical Therapy 3 times weekly for 4 weeks to include the following interventions: Manual Therapy, Neuromuscular Re-ed, Patient Education, Self Care, Therapeutic Activities, and Therapeutic Exercise.        Preston Morales, PTA

## 2024-05-03 ENCOUNTER — CLINICAL SUPPORT (OUTPATIENT)
Dept: REHABILITATION | Facility: HOSPITAL | Age: 69
End: 2024-05-03
Payer: OTHER MISCELLANEOUS

## 2024-05-03 DIAGNOSIS — M54.2 CERVICALGIA OF OCCIPITO-ATLANTO-AXIAL REGION: ICD-10-CM

## 2024-05-03 DIAGNOSIS — G89.29 CHRONIC RIGHT SHOULDER PAIN: Primary | ICD-10-CM

## 2024-05-03 DIAGNOSIS — M25.511 CHRONIC RIGHT SHOULDER PAIN: Primary | ICD-10-CM

## 2024-05-03 PROCEDURE — 97110 THERAPEUTIC EXERCISES: CPT

## 2024-05-03 PROCEDURE — 97112 NEUROMUSCULAR REEDUCATION: CPT

## 2024-05-03 NOTE — PROGRESS NOTES
[facility]   Physical Therapy Treatment Note      Name: Carlin Madrigal  Clinic Number: [id number]    Therapy Diagnosis:   Encounter Diagnosis   Name Primary?    Chronic right shoulder pain Yes     Physician: Chandrakant Laughlin MD    Visit Date: 5/3/2024    Physician Orders: PT Eval and Treat: Work Hardening/Conditioning   Medical Diagnosis from Referral:   S06.6X9S (ICD-10-CM) - Subarachnoid hemorrhage following injury, with loss of consciousness, sequela   S06.0X9S (ICD-10-CM) - Concussion with loss of consciousness, sequela   S13.4XXD (ICD-10-CM) - Whiplash injury to neck, subsequent encounter   Evaluation Date: 4/8/2024  Authorization Period Expiration: 10/2/2024  Plan of Care Expiration: 5/10/2024  Visit # / Visits authorized: 11/12  PTA: 0/5    FOTO: 1/3     Time In: 7:50am  Time Out: 9:00am  Total Appointment Time: 70 minutes    Precautions: Standard and Diabetes     Reports out of work and has not returned since June of last year.   Company: [employer]   Job title:  - primarily just doing framing work    Subjective     Pt reports: overall feeling R shoulder pain decreasing over time and not really having any s/s at rest or with movement below shoulder height. Still with c/o R shoulder pain with activities overhead. Also his neck is continuing to feel better and better; though still having some difficulties looking all the way to the right.   He was compliant with home exercise program.  Response to previous treatment: increased stiffness cervical spine post  Function: activities overhead are a little difficult due to shoulder pain    Pain: 2/10 right side of neck if turn to the right & 3/10 pain shoulder with overhead movement   Location:  right side of neck      Objective      Objective Measures updated at progress report unless specified.     Cervical Screening:   - Flexion 64 On eval vs currently 70  - Extension 32 with R sided neck pain end ROM On eval vs  "currently 42  - Rotation 68 L rotation vs 47 R rotation On eval vs currently 74 L rotation and 72 R rotation  - Quadrant R extension with (+) CS R sided neck s/s On eval vs currently (+)  - ttp to R suboccipital mm On eval vs currently (+)  - R  position II 50 vs L 65lbs (RHD) On eval vs currently 70lbs  - Flexion Rotation test (+) to R On eval vs currently (+) min at end ROM; cleared after MWM      Shoulder Screening:   Observations: Pt demonstrates upper crossed posture with head forward and rounded shoulders  Palpation: Pt with ttp to the R RTC region about the R Supraspinatus     Shoulder AROM Right (*) = in degrees Left (*) = in degrees   Flexion 104 On eval vs currently 148 standing and 153 Supine 115   Scaption 82 On eval vs currently 126 124   IR LSJ : functional reaching T8   ER CTJ : functional reaching 74* PROM and painful end ROM On eval vs currently On eval vs currently 80 AROM 83* max PROM limited to pain T4/5      Thoracic Screening: limited AROM extension and rotation R/L at end ranges of motion     Shoulder MMT scores Right: X/5 Left: x/5   Scaption 3+p/5 on eval vs 4-/5 currently within av ROM 4/5    IR 4/5 vs 4+/5 currently within av ROM 5/5    ER 4-p /5 vs 4p/5 currently within av ROM 4+/5      Special testing cluster exams:  RTC pathology: Painful arc, Drop arm sign, Infraspinatus: Met 2/3     Joint testing: JS w/ posterior glide GH joint    Treatment     Carlin received the treatments listed below:      therapeutic exercises to develop strength, endurance, ROM, flexibility, posture, and core stabilization for 35 minutes including:  Supine chin tucks: x20 c/ 5" hold  Supine right cervical rotations: x20 c/ 5" hold  Right Levator scap stretch: 3x30 sec  SNAG right rotation: 2x10 c/ 5 sec hold  SNAG cervical extensions: 1x10 c/5 sec hold  Physioball flexion roll out AAROM x10   AAROM with dowel shoulder flexion: x20 c/ 5 sec hold  AAROM with dowel shoulder abduciton: x20 c/ 5 sec hold  AAROM " "with dowel shoulder ER: x20 c/ 5 sec hold  Pulleys flexion/abd: 2 min/each direction    manual therapy techniques for 0 minutes, including:  STM to right occiput regions, levator scap  R cervical rotational MWM 2x10, contract relax into R rotation x10 progressing ROM; cervical distraction with belt 30" x4  R C1/2 MET - side bend max to L, then rotate R to end ROM. Have the pt slightly rotate their head left into you hand, matching you resistance. Hold for 6 seconds, then  slack into R rotation and repeat.   Upper Cervical rotation into flexion - flex cervical spine to max flexion, turn head to R within pain free ROM. Have pt slightly rotation L into your hand, matching your resistance. Hold 6 seconds.  slack into R rotation and repeat.     neuromuscular re-education activities to improve: Balance, Coordination, Kinesthetic, Sense, Proprioception, and Posture for 40 minutes. The following activities were included:    Circuit alternating activities:   Standing R shoulder Hz shoulder abduction/scaption RTB 2x12  Standing R shoulder pull down RTB/BTB 2x15  Standing serratus wall slides     Circuit alternating activities:   Bent over rows, 20# kb: 2x10  Seated ER to OH press starting from 90* shoulder scaption 2# 2x15  Supine thoracic extension over foam roll x8 f/b along spine 3" hold     Circuit alternating activities:   Landmine press 10lb bar starting from treatment table 2x10  Standing wall lower trap lift offs: 3x10  Thoracic rotation open<>close book x12    Prone shoulder extensions: 2x10 c/ 5 sec hold  Side lying shoulder abduction 1#: 3x8  Side lying shoulder Hx abduction 1#: 3x10  Right shoulder ER walkouts + uppercut at end of each walkout with yellow tb: 3x10 c/ 5 sec hold  Side lying shoulder ER, 2#: 3x10    therapeutic activities to improve functional performance for 0 minutes, including:  Planned for work simulation     Patient Education and Home Exercises       Education provided:   - " Patient was educated on HEP and on all therapeutic interventions performed during today's treatment visit.     Home Exercises Provided: Yes & Patient instructed to cont prior HEP. Exercises were reviewed and Carlin was able to demonstrate them prior to the end of the session.  Carlin demonstrated good  understanding of the education provided. See EMR under Patient Instructions for exercises provided during therapy sessions    Assessment   Patient overall continues to progress well with PT intervention at this time endorsing increased R cervical rotational mobility and increased right shoulder stability. Though this is the case, he is still feeling cervical pain and stiffness at end range ipsilaterally. He is also progressing well in terms of his R should dysfunction. He reports pain levels are decreasing; however, still having difficulty with overhead activities. Patient provided good tolerance to therapeutic exercises, keeping in mind that patient was instructed to keep range of motion in pain free range with all therapeutic exercises. Plan to continue to progress load as pt is able to tolerate.      Patient prognosis is: Fair<>Good    Pt will continue to benefit from skilled Physical Therapy interventions in order to address the deficits listed in the problem list box on initial evaluation, provide education, and to address the musculoskeletal limitations and work-related functional deficits for their job as a .    Pt's spiritual, cultural and educational needs considered and pt agreeable to plan of care and goals.     Anticipated barriers to physical therapy: chronicity of current injury     Goals:   LongTerm Goals: 4 weeks:      1.) Pt will become compliant and independent with the initial HEP to promote decreased pain and improved mobility and strength. MET  2.) Pt will become compliant and independent with an updated, progressed HEP to promote decreased pain and improved mobility and  strength as well as prep for discharge from further PT intervention.   3.) Pt to report decrease in pain levels from 10/10 at worse to 3/10 at worse.   4.) Pt will improve R shoulder MMT scores by 2 muscle grade to improve functional stability and strength.   5.) Pt will demonstrate improved ability to reach over head with little to no c/o with the left shoulders in order to demonstrate improved functional shoulder mobility and self care.   6.) Pt will report 75%+ of his PLOF to demonstrate return to ADLs and community activities.   7.) Pt will demonstrate ability to lift 50lbs from floor to waist with good body mechanics within the clinic in order to simulate RTW goals.   8.) Pt will demonstrate ability to push and pull 100lbs x100 ft within the clinic in order to simulate RTW goals.    9.) Pt will demonstrate R cervical ROM WFL in order to improve ability to scan environment.      Pt will return to work full duty, full time.    Plan   Plan of care Certification: 4/8/2024 to 5/10/2024     Important to note, that the oatient may need an additional bout of PT by end of current POC based on presentation today.     Outpatient Physical Therapy 3 times weekly for 4 weeks to include the following interventions: Manual Therapy, Neuromuscular Re-ed, Patient Education, Self Care, Therapeutic Activities, and Therapeutic Exercise.        Yaron Celis, PT

## 2024-05-06 ENCOUNTER — CLINICAL SUPPORT (OUTPATIENT)
Dept: REHABILITATION | Facility: HOSPITAL | Age: 69
End: 2024-05-06
Payer: OTHER MISCELLANEOUS

## 2024-05-06 DIAGNOSIS — M25.511 CHRONIC RIGHT SHOULDER PAIN: Primary | ICD-10-CM

## 2024-05-06 DIAGNOSIS — G89.29 CHRONIC RIGHT SHOULDER PAIN: Primary | ICD-10-CM

## 2024-05-06 PROCEDURE — 97112 NEUROMUSCULAR REEDUCATION: CPT

## 2024-05-06 PROCEDURE — 97110 THERAPEUTIC EXERCISES: CPT

## 2024-05-06 PROCEDURE — 97140 MANUAL THERAPY 1/> REGIONS: CPT

## 2024-05-06 NOTE — PROGRESS NOTES
[facility]   Physical Therapy Treatment Note      Name: Carlin Madrigal  Clinic Number: [id number]    Therapy Diagnosis:   Encounter Diagnosis   Name Primary?    Chronic right shoulder pain Yes     Physician: Chandrakant Laughlin MD    Visit Date: 5/6/2024    Physician Orders: PT Eval and Treat: Work Hardening/Conditioning   Medical Diagnosis from Referral:   S06.6X9S (ICD-10-CM) - Subarachnoid hemorrhage following injury, with loss of consciousness, sequela   S06.0X9S (ICD-10-CM) - Concussion with loss of consciousness, sequela   S13.4XXD (ICD-10-CM) - Whiplash injury to neck, subsequent encounter   Evaluation Date: 4/8/2024  Authorization Period Expiration: 10/2/2024  Plan of Care Expiration: 6/28/2024  Visit # / Visits authorized: 12/12  PTA: 0/5    FOTO: 1/3     Time In: 7:50am  Time Out: 8:50am  Total Appointment Time: 60 minutes    Precautions: Standard and Diabetes     Reports out of work and has not returned since June of last year.   Company: [employer]   Job title:  - primarily just doing framing work    Subjective     Pt reports: overall feeling R shoulder pain decreasing over time and not really having any s/s at rest or with movement below shoulder height. Still with c/o R shoulder pain with activities overhead.Was pretty sore in his R shoulder after his last visit for about 24 hours. Also his neck is continuing to feel better and better; though still having some difficulties looking all the way to the right.  He was compliant with home exercise program.  Response to previous treatment: increased stiffness cervical spine post  Function: activities overhead are a little difficult due to shoulder pain    Pain: 2/10 right side of neck if turn to the right & 2-4/10 pain shoulder with overhead movement   Location:  right side of neck      Objective      Objective Measures updated at progress report unless specified.     Step deformity noted today - R shoulder.   R  "shoulder ER AROM full and no s/s today.   Scapular mobility WFL, no pain.   (+) flexion rotation R cervical testing end ROM, cleared post MET    Cervical Screening:   - Flexion 64 On eval vs currently 70  - Extension 32 with R sided neck pain end ROM On eval vs currently 42  - Rotation 68 L rotation vs 47 R rotation On eval vs currently 74 L rotation and 72 R rotation  - Quadrant R extension with (+) CS R sided neck s/s On eval vs currently (+)  - ttp to R suboccipital mm On eval vs currently (+)  - R  position II 50 vs L 65lbs (RHD) On eval vs currently 70lbs  - Flexion Rotation test (+) to R On eval vs currently (+) min at end ROM; cleared after MWM      Shoulder Screening:   Observations: Pt demonstrates upper crossed posture with head forward and rounded shoulders  Palpation: Pt with ttp to the R RTC region about the R Supraspinatus     Shoulder AROM Right (*) = in degrees Left (*) = in degrees   Flexion 104 On eval vs currently 148 standing and 153 Supine 115   Scaption 82 On eval vs currently 126 124   IR LSJ : functional reaching T8   ER CTJ : functional reaching 74* PROM and painful end ROM On eval vs currently On eval vs currently 80 AROM 83* max PROM limited to pain T4/5      Thoracic Screening: limited AROM extension and rotation R/L at end ranges of motion     Shoulder MMT scores Right: X/5 Left: x/5   Scaption 3+p/5 on eval vs 4-/5 currently within av ROM 4/5    IR 4/5 vs 4+/5 currently within av ROM 5/5    ER 4-p /5 vs 4p/5 currently within av ROM 4+/5      Special testing cluster exams:  RTC pathology: Painful arc, Drop arm sign, Infraspinatus: Met 2/3     Joint testing: JS w/ posterior glide GH joint    Treatment     Carlin received the treatments listed below:      therapeutic exercises to develop strength, endurance, ROM, flexibility, posture, and core stabilization for 25 minutes including:  Supine chin tucks: x20 c/ 5" hold  Supine right cervical rotations: x20 c/ 5" hold  Right Levator scap " "stretch: 3x30 sec  SNAG right rotation: 2x10 c/ 5 sec hold  SNAG cervical extensions: 1x10 c/5 sec hold  Physioball flexion roll out AAROM x10   AAROM with dowel shoulder flexion: x20 c/ 5 sec hold  AAROM with dowel shoulder abduciton: x20 c/ 5 sec hold  AAROM with dowel shoulder ER: x20 c/ 5 sec hold  Pulleys flexion/abd: 2 min/each direction    manual therapy techniques for 10 minutes, including:  STM to right occiput regions, levator scap  R cervical rotational MWM 2x10, contract relax into R rotation x10 progressing ROM; cervical distraction with belt 30" x4  R C1/2 MET - side bend max to L, then rotate R to end ROM. Have the pt slightly rotate their head left into you hand, matching you resistance. Hold for 6 seconds, then  slack into R rotation and repeat.   Upper Cervical rotation into flexion - flex cervical spine to max flexion, turn head to R within pain free ROM. Have pt slightly rotation L into your hand, matching your resistance. Hold 6 seconds.  slack into R rotation and repeat.   R shoulder MWM into flexion x10    neuromuscular re-education activities to improve: Balance, Coordination, Kinesthetic, Sense, Proprioception, and Posture for 25 minutes. The following activities were included:    Circuit alternating activities:   Standing R shoulder Hz shoulder abduction/scaption RTB 2x12  Standing R shoulder pull down RTB/BTB 2x15  Standing serratus wall slides     Circuit alternating activities:   Bent over rows, 20# kb: 2x10  Seated ER to OH press starting from 90* shoulder scaption 2# 2x15  Supine thoracic extension over foam roll x8 f/b along spine 3" hold     Circuit alternating activities:   Landmine press 10lb bar starting from treatment table 2x10  Standing wall lower trap lift offs: 3x10  Thoracic rotation open<>close book x12    Prone shoulder extensions: 2x10 c/ 5 sec hold  Side lying shoulder abduction 1#: 3x8  Side lying shoulder Hx abduction 1#: 3x10  Right shoulder ER walkouts " + uppercut at end of each walkout with yellow tb: 3x10 c/ 5 sec hold  Side lying shoulder ER, 2#: 3x10    therapeutic activities to improve functional performance for 0 minutes, including:  Planned for work simulation     Patient Education and Home Exercises       Education provided:   - Patient was educated on HEP and on all therapeutic interventions performed during today's treatment visit.     Home Exercises Provided: Yes & Patient instructed to cont prior HEP. Exercises were reviewed and Carlin was able to demonstrate them prior to the end of the session.  Carlin demonstrated good  understanding of the education provided. See EMR under Patient Instructions for exercises provided during therapy sessions    Assessment   Patient overall continues to progress well with PT intervention at this time endorsing increased R cervical rotational mobility and increased right shoulder stability. Though this is the case, he is still feeling cervical pain and stiffness at end range ipsilaterally. He is also progressing well in terms of his R should dysfunction. He reports pain levels are decreasing; however, still having difficulty with overhead activities. Patient provided good tolerance to therapeutic exercises, keeping in mind that patient was instructed to keep range of motion in pain free range with all therapeutic exercises. Plan to continue to progress load as pt is able to tolerate.      Patient prognosis is: Fair<>Good    Pt will continue to benefit from skilled Physical Therapy interventions in order to address the deficits listed in the problem list box on initial evaluation, provide education, and to address the musculoskeletal limitations and work-related functional deficits for their job as a .    Pt's spiritual, cultural and educational needs considered and pt agreeable to plan of care and goals.     Anticipated barriers to physical therapy: chronicity of current injury     Goals:   LongTerm  Goals: 4 weeks:      1.) Pt will become compliant and independent with the initial HEP to promote decreased pain and improved mobility and strength. MET  2.) Pt will become compliant and independent with an updated, progressed HEP to promote decreased pain and improved mobility and strength as well as prep for discharge from further PT intervention.   3.) Pt to report decrease in pain levels from 10/10 at worse to 3/10 at worse. MET  4.) Pt will improve R shoulder MMT scores by 2 muscle grade to improve functional stability and strength.   5.) Pt will demonstrate improved ability to reach over head with little to no c/o with the left shoulders in order to demonstrate improved functional shoulder mobility and self care.   6.) Pt will report 75%+ of his PLOF to demonstrate return to ADLs and community activities.   7.) Pt will demonstrate ability to lift 50lbs from floor to waist with good body mechanics within the clinic in order to simulate RTW goals.   8.) Pt will demonstrate ability to push and pull 100lbs x100 ft within the clinic in order to simulate RTW goals.    9.) Pt will demonstrate R cervical ROM WFL in order to improve ability to scan environment. MET     Pt will return to work full duty, full time.    Plan   Plan of care Certification: 4/8/2024 to 5/10/2024, 5/10/2024 to 6/28/2024.      Important to note, that the oatient may need an additional bout of PT by end of current POC based on presentation today.     Outpatient Physical Therapy 3 times weekly for 4 weeks to include the following interventions: Manual Therapy, Neuromuscular Re-ed, Patient Education, Self Care, Therapeutic Activities, and Therapeutic Exercise.      Yaron Celis, PT

## 2024-05-09 ENCOUNTER — CLINICAL SUPPORT (OUTPATIENT)
Dept: REHABILITATION | Facility: HOSPITAL | Age: 69
End: 2024-05-09
Payer: OTHER MISCELLANEOUS

## 2024-05-09 DIAGNOSIS — M54.2 CERVICALGIA OF OCCIPITO-ATLANTO-AXIAL REGION: ICD-10-CM

## 2024-05-09 DIAGNOSIS — G89.29 CHRONIC RIGHT SHOULDER PAIN: Primary | ICD-10-CM

## 2024-05-09 DIAGNOSIS — M25.511 CHRONIC RIGHT SHOULDER PAIN: Primary | ICD-10-CM

## 2024-05-09 DIAGNOSIS — S13.4XXA WHIPLASH INJURY TO NECK, INITIAL ENCOUNTER: ICD-10-CM

## 2024-05-09 DIAGNOSIS — G44.86 CERVICOGENIC HEADACHE: ICD-10-CM

## 2024-05-09 PROCEDURE — 97112 NEUROMUSCULAR REEDUCATION: CPT

## 2024-05-09 PROCEDURE — 97110 THERAPEUTIC EXERCISES: CPT

## 2024-05-09 PROCEDURE — 97140 MANUAL THERAPY 1/> REGIONS: CPT

## 2024-05-09 NOTE — PROGRESS NOTES
[facility]   Physical Therapy Treatment Note      Name: Carlin Madrigal  Clinic Number: [id number]    Therapy Diagnosis:   Encounter Diagnosis   Name Primary?    Chronic right shoulder pain Yes     Physician: Chandrakant Laughlin MD    Visit Date: 5/9/2024    Physician Orders: PT Eval and Treat: Work Hardening/Conditioning   Medical Diagnosis from Referral:   S06.6X9S (ICD-10-CM) - Subarachnoid hemorrhage following injury, with loss of consciousness, sequela   S06.0X9S (ICD-10-CM) - Concussion with loss of consciousness, sequela   S13.4XXD (ICD-10-CM) - Whiplash injury to neck, subsequent encounter   Evaluation Date: 4/8/2024  Authorization Period Expiration: 10/2/2024  Plan of Care Expiration: 6/28/2024  Visit # / Visits authorized: 12/12, 1/16 (though pending UR, do not schedule f/u until auth rec'd)  PTA: 0/5    FOTO: 1/3     Time In: 9:30am  Time Out: 10:30am  Total Appointment Time: 60 minutes    Precautions: Standard and Diabetes     Reports out of work and has not returned since June of last year.   Company: [employer]   Job title:  - primarily just doing framing work    Subjective     Pt reports: overall his neck is doing really well overall and only having a very minor bit if he pushes it to look all the way to the right, otherwise, no issues and ROM marked improved. R shoulder still primary c/o when reaching above shoulder height, though improving. R shoulder does feel weak overall.   He was compliant with home exercise program.  Response to previous treatment: increased stiffness cervical spine post  Function: activities overhead are a little difficult due to shoulder pain    Pain: 2/10 right side of neck if turn to the right & 2-4/10 pain shoulder with overhead movement   Location:  right side of neck      Objective      Objective Measures updated at progress report unless specified.     Step deformity noted today - R shoulder.   R shoulder ER AROM full and no  "s/s today.   Scapular mobility WFL, no pain.   (+) flexion rotation R cervical testing end ROM, cleared post MET    Cervical Screening:   - Flexion 64 On eval vs currently 70  - Extension 32 with R sided neck pain end ROM On eval vs currently 42  - Rotation 68 L rotation vs 47 R rotation On eval vs currently 74 L rotation and 72 R rotation  - Quadrant R extension with (+) CS R sided neck s/s On eval vs currently (+)  - ttp to R suboccipital mm On eval vs currently (+)  - R  position II 50 vs L 65lbs (RHD) On eval vs currently 70lbs  - Flexion Rotation test (+) to R On eval vs currently (+) min at end ROM; cleared after MWM      Shoulder Screening:   Observations: Pt demonstrates upper crossed posture with head forward and rounded shoulders  Palpation: Pt with ttp to the R RTC region about the R Supraspinatus     Shoulder AROM Right (*) = in degrees Left (*) = in degrees   Flexion 104 On eval vs currently 148 standing and 153 Supine 115   Scaption 82 On eval vs currently 126 124   IR LSJ : functional reaching T8   ER CTJ : functional reaching 74* PROM and painful end ROM On eval vs currently On eval vs currently 80 AROM 83* max PROM limited to pain T4/5      Thoracic Screening: limited AROM extension and rotation R/L at end ranges of motion     Shoulder MMT scores Right: X/5 Left: x/5   Scaption 3+p/5 on eval vs 4-/5 currently within av ROM 4/5    IR 4/5 vs 4+/5 currently within av ROM 5/5    ER 4-p /5 vs 4p/5 currently within av ROM 4+/5      Special testing cluster exams:  RTC pathology: Painful arc, Drop arm sign, Infraspinatus: Met 2/3     Joint testing: JS w/ posterior glide GH joint    Treatment     Carlin received the treatments listed below:      therapeutic exercises to develop strength, endurance, ROM, flexibility, posture, and core stabilization for 25 minutes including:  Supine chin tucks: x20 c/ 5" hold  Supine right cervical rotations: x20 c/ 5" hold  Right Levator scap stretch: 3x30 sec  SNAG " "right rotation: 2x10 c/ 5 sec hold  SNAG cervical extensions: 1x10 c/5 sec hold  Physioball flexion roll out AAROM x10   AAROM with dowel shoulder flexion: x20 c/ 5 sec hold  AAROM with dowel shoulder abduciton: x20 c/ 5 sec hold  AAROM with dowel shoulder ER: x20 c/ 5 sec hold  Pulleys flexion/abd: 2 min/each direction    manual therapy techniques for 10 minutes, including:  STM to right occiput regions, levator scap  R cervical rotational MWM 2x10, contract relax into R rotation x10 progressing ROM; cervical distraction with belt 30" x4  R C1/2 MET - side bend max to L, then rotate R to end ROM. Have the pt slightly rotate their head left into you hand, matching you resistance. Hold for 6 seconds, then  slack into R rotation and repeat.   Upper Cervical rotation into flexion - flex cervical spine to max flexion, turn head to R within pain free ROM. Have pt slightly rotation L into your hand, matching your resistance. Hold 6 seconds.  slack into R rotation and repeat.   R shoulder MWM into flexion x10    neuromuscular re-education activities to improve: Balance, Coordination, Kinesthetic, Sense, Proprioception, and Posture for 25 minutes. The following activities were included:    Circuit alternating activities:   Standing R shoulder Hz shoulder abduction/scaption GTB 2x12  Standing R shoulder pull down RTB/BTB 2x20  Physioball flexion roll out 1'    Circuit alternating activities:   Bent over rows, 20# kb: 2x10  Seated ER to OH press starting from 90* shoulder scaption 2# 2x15  Supine thoracic extension over foam roll x8 f/b along spine 3" hold   Standing serratus wall slides z    Circuit alternating activities:   Landmine press 10lb bar starting from treatment table 2x10  Standing wall lower trap lift offs: 3x10  Thoracic rotation open<>close book x12    Prone shoulder extensions: 2x10 c/ 5 sec hold  Side lying shoulder abduction 1#: 3x8  Side lying shoulder Hx abduction 1#: 3x10  Right shoulder ER " walkouts + uppercut at end of each walkout with yellow tb: 3x10 c/ 5 sec hold  Side lying shoulder ER, 2#: 3x10    therapeutic activities to improve functional performance for 0 minutes, including:  Planned for work simulation     Patient Education and Home Exercises       Education provided:   - Patient was educated on HEP and on all therapeutic interventions performed during today's treatment visit.     Home Exercises Provided: Yes & Patient instructed to cont prior HEP. Exercises were reviewed and Carlin was able to demonstrate them prior to the end of the session.  Carlin demonstrated good  understanding of the education provided. See EMR under Patient Instructions for exercises provided during therapy sessions    Assessment   Patient overall continues to progress well with PT intervention at this time endorsing increased R cervical rotational mobility and increased right shoulder stability. Though this is the case, he is still feeling cervical pain and stiffness at end range ipsilaterally. He is also progressing well in terms of his R should dysfunction. He reports pain levels are decreasing; however, still having difficulty with overhead activities. Patient provided good tolerance to therapeutic exercises, keeping in mind that patient was instructed to keep range of motion in pain free range with all therapeutic exercises. Plan to continue to progress load as pt is able to tolerate.      Patient prognosis is: Fair<>Good    Pt will continue to benefit from skilled Physical Therapy interventions in order to address the deficits listed in the problem list box on initial evaluation, provide education, and to address the musculoskeletal limitations and work-related functional deficits for their job as a .    Pt's spiritual, cultural and educational needs considered and pt agreeable to plan of care and goals.     Anticipated barriers to physical therapy: chronicity of current injury     Goals:    LongTerm Goals: 4 weeks:      1.) Pt will become compliant and independent with the initial HEP to promote decreased pain and improved mobility and strength. MET  2.) Pt will become compliant and independent with an updated, progressed HEP to promote decreased pain and improved mobility and strength as well as prep for discharge from further PT intervention.   3.) Pt to report decrease in pain levels from 10/10 at worse to 3/10 at worse. MET  4.) Pt will improve R shoulder MMT scores by 2 muscle grade to improve functional stability and strength.   5.) Pt will demonstrate improved ability to reach over head with little to no c/o with the left shoulders in order to demonstrate improved functional shoulder mobility and self care.   6.) Pt will report 75%+ of his PLOF to demonstrate return to ADLs and community activities.   7.) Pt will demonstrate ability to lift 50lbs from floor to waist with good body mechanics within the clinic in order to simulate RTW goals.   8.) Pt will demonstrate ability to push and pull 100lbs x100 ft within the clinic in order to simulate RTW goals.    9.) Pt will demonstrate R cervical ROM WFL in order to improve ability to scan environment. MET     Pt will return to work full duty, full time.    Plan   Plan of care Certification: 4/8/2024 to 5/10/2024, 5/10/2024 to 6/28/2024.      Important to note, that the oatient may need an additional bout of PT by end of current POC based on presentation today.     Outpatient Physical Therapy 3 times weekly for 4 weeks to include the following interventions: Manual Therapy, Neuromuscular Re-ed, Patient Education, Self Care, Therapeutic Activities, and Therapeutic Exercise.      Yaron Celis, PT

## 2024-05-14 ENCOUNTER — CLINICAL SUPPORT (OUTPATIENT)
Dept: REHABILITATION | Facility: HOSPITAL | Age: 69
End: 2024-05-14
Payer: OTHER MISCELLANEOUS

## 2024-05-14 DIAGNOSIS — M25.511 CHRONIC RIGHT SHOULDER PAIN: Primary | ICD-10-CM

## 2024-05-14 DIAGNOSIS — G89.29 CHRONIC RIGHT SHOULDER PAIN: Primary | ICD-10-CM

## 2024-05-14 PROCEDURE — 97112 NEUROMUSCULAR REEDUCATION: CPT

## 2024-05-14 PROCEDURE — 97140 MANUAL THERAPY 1/> REGIONS: CPT

## 2024-05-14 PROCEDURE — 97110 THERAPEUTIC EXERCISES: CPT

## 2024-05-14 NOTE — PROGRESS NOTES
[facility]   Physical Therapy Treatment Note      Name: Carlin Madrigal  Clinic Number: [id number]    Therapy Diagnosis:   Encounter Diagnosis   Name Primary?    Chronic right shoulder pain Yes     Physician: Chandrakant Laughlin MD    Visit Date: 5/14/2024    Physician Orders: PT Eval and Treat: Work Hardening/Conditioning   Medical Diagnosis from Referral:   S06.6X9S (ICD-10-CM) - Subarachnoid hemorrhage following injury, with loss of consciousness, sequela   S06.0X9S (ICD-10-CM) - Concussion with loss of consciousness, sequela   S13.4XXD (ICD-10-CM) - Whiplash injury to neck, subsequent encounter   Evaluation Date: 4/8/2024  Authorization Period Expiration: 10/2/2024  Plan of Care Expiration: 6/28/2024  Visit # / Visits authorized: 12/12, 2/16 (though pending UR, do not schedule f/u until auth rec'd)  PTA: 0/5    FOTO: 1/3     Time In: 7:48am  Time Out: 8:48am  Total Appointment Time: 60 minutes    Precautions: Standard and Diabetes     Reports out of work and has not returned since June of last year.   Company: [employer]   Job title:  - primarily just doing framing work    Subjective     Pt reports: overall his neck is doing really well overall and only having a very minor bit if he pushes it to look all the way to the right, otherwise, no issues and ROM marked improved. R shoulder still primary c/o when reaching above shoulder height, though improving. R shoulder does feel weak overall.   He was compliant with home exercise program.  Response to previous treatment: increased stiffness cervical spine post  Function: activities overhead are a little difficult due to shoulder pain    Pain: 2/10 right side of neck if turn to the right & 2-4/10 pain shoulder with overhead movement   Location:  right side of neck      Objective      Objective Measures updated at progress report unless specified.     Step deformity noted today - R shoulder.   R shoulder ER AROM full and no  "s/s today.   Scapular mobility WFL, no pain.   (+) flexion rotation R cervical testing end ROM, cleared post MET    Cervical Screening:   - Flexion 64 On eval vs currently 70  - Extension 32 with R sided neck pain end ROM On eval vs currently 42  - Rotation 68 L rotation vs 47 R rotation On eval vs currently 74 L rotation and 72 R rotation  - Quadrant R extension with (+) CS R sided neck s/s On eval vs currently (+)  - ttp to R suboccipital mm On eval vs currently (+)  - R  position II 50 vs L 65lbs (RHD) On eval vs currently 70lbs  - Flexion Rotation test (+) to R On eval vs currently (+) min at end ROM; cleared after MWM      Shoulder Screening:   Observations: Pt demonstrates upper crossed posture with head forward and rounded shoulders  Palpation: Pt with ttp to the R RTC region about the R Supraspinatus     Shoulder AROM Right (*) = in degrees Left (*) = in degrees   Flexion 104 On eval vs currently 148 standing and 153 Supine 115   Scaption 82 On eval vs currently 126 124   IR LSJ : functional reaching T8   ER CTJ : functional reaching 74* PROM and painful end ROM On eval vs currently On eval vs currently 80 AROM 83* max PROM limited to pain T4/5      Thoracic Screening: limited AROM extension and rotation R/L at end ranges of motion     Shoulder MMT scores Right: X/5 Left: x/5   Scaption 3+p/5 on eval vs 4-/5 currently within av ROM 4/5    IR 4/5 vs 4+/5 currently within av ROM 5/5    ER 4-p /5 vs 4p/5 currently within av ROM 4+/5      Special testing cluster exams:  RTC pathology: Painful arc, Drop arm sign, Infraspinatus: Met 2/3     Joint testing: JS w/ posterior glide GH joint    Treatment     Carlin received the treatments listed below:      therapeutic exercises to develop strength, endurance, ROM, flexibility, posture, and core stabilization for 25 minutes including:    [] Supine chin tucks: x20 c/ 5" hold  [] Supine right cervical rotations: x20 c/ 5" hold  [x] Right Levator scap stretch: 3x30 " "sec  [x] SNAG right rotation: 2x10 c/ 5 sec hold  [x] SNAG cervical extensions: 1x10 c/5 sec hold  [x] Physioball flexion roll out AAROM x10    manual therapy techniques for 10 minutes, including:    [] R cervical rotational MWM 2x10, contract relax into R rotation x10 progressing ROM; cervical distraction with belt 30" x4  [] R C1/2 MET - side bend max to L, then rotate R to end ROM. Have the pt slightly rotate their head left into you hand, matching you resistance. Hold for 6 seconds, then  slack into R rotation and repeat.   [] Upper Cervical rotation into flexion - flex cervical spine to max flexion, turn head to R within pain free ROM. Have pt slightly rotation L into your hand, matching your resistance. Hold 6 seconds.  slack into R rotation and repeat.   [x] R shoulder MWM into flexion x10    neuromuscular re-education activities to improve: Balance, Coordination, Kinesthetic, Sense, Proprioception, and Posture for 25 minutes. The following activities were included:    [x] Circuit alternating activities:   Standing R shoulder Hz shoulder abduction/scaption GTB 2x12  Standing R shoulder pull down RTB/BTB 2x20  Physioball flexion roll out 1'    [x] Circuit alternating activities:   Bent over rows, 20# kb: 2x10  Seated ER to OH press starting from 90* shoulder scaption 2# 2x15  Supine thoracic extension over foam roll x8 f/b along spine 3" hold   Standing serratus wall slides z    [] Circuit alternating activities:   Landmine press 10lb bar starting from treatment table 2x10  Standing wall lower trap lift offs: 3x10  Thoracic rotation open<>close book x12    therapeutic activities to improve functional performance for 0 minutes, including:    [x] Strong Carry 25lb KB 3x1'  [x] Strong carry 2lb 90/90 position 2x1'    Patient Education and Home Exercises       Education provided:   - Patient was educated on HEP and on all therapeutic interventions performed during today's treatment visit.     Home " Exercises Provided: Yes & Patient instructed to cont prior HEP. Exercises were reviewed and Carlin was able to demonstrate them prior to the end of the session.  Carlin demonstrated good  understanding of the education provided. See EMR under Patient Instructions for exercises provided during therapy sessions    Assessment   Patient overall continues to progress well with PT intervention at this time endorsing increased R cervical rotational mobility and increased right shoulder stability. Though this is the case, he is still feeling cervical pain and stiffness at end range ipsilaterally. He is also progressing well in terms of his R should dysfunction. He reports pain levels are decreasing; however, still having difficulty with overhead activities. Patient provided good tolerance to therapeutic exercises, keeping in mind that patient was instructed to keep range of motion in pain free range with all therapeutic exercises. Plan to continue to progress load as pt is able to tolerate.      Patient prognosis is: Fair<>Good    Pt will continue to benefit from skilled Physical Therapy interventions in order to address the deficits listed in the problem list box on initial evaluation, provide education, and to address the musculoskeletal limitations and work-related functional deficits for their job as a .    Pt's spiritual, cultural and educational needs considered and pt agreeable to plan of care and goals.     Anticipated barriers to physical therapy: chronicity of current injury     Goals:   LongTerm Goals: 4 weeks:      1.) Pt will become compliant and independent with the initial HEP to promote decreased pain and improved mobility and strength. MET  2.) Pt will become compliant and independent with an updated, progressed HEP to promote decreased pain and improved mobility and strength as well as prep for discharge from further PT intervention.   3.) Pt to report decrease in pain levels from  10/10 at worse to 3/10 at worse. MET  4.) Pt will improve R shoulder MMT scores by 2 muscle grade to improve functional stability and strength.   5.) Pt will demonstrate improved ability to reach over head with little to no c/o with the left shoulders in order to demonstrate improved functional shoulder mobility and self care.   6.) Pt will report 75%+ of his PLOF to demonstrate return to ADLs and community activities.   7.) Pt will demonstrate ability to lift 50lbs from floor to waist with good body mechanics within the clinic in order to simulate RTW goals.   8.) Pt will demonstrate ability to push and pull 100lbs x100 ft within the clinic in order to simulate RTW goals.    9.) Pt will demonstrate R cervical ROM WFL in order to improve ability to scan environment. MET     Pt will return to work full duty, full time.    Plan   Plan of care Certification: 4/8/2024 to 5/10/2024, 5/10/2024 to 6/28/2024.      Important to note, that the oatient may need an additional bout of PT by end of current POC based on presentation today.     Outpatient Physical Therapy 3 times weekly for 4 weeks to include the following interventions: Manual Therapy, Neuromuscular Re-ed, Patient Education, Self Care, Therapeutic Activities, and Therapeutic Exercise.      Yaron Celis, PT

## 2024-05-16 ENCOUNTER — CLINICAL SUPPORT (OUTPATIENT)
Dept: REHABILITATION | Facility: HOSPITAL | Age: 69
End: 2024-05-16
Payer: OTHER MISCELLANEOUS

## 2024-05-16 DIAGNOSIS — G89.29 CHRONIC RIGHT SHOULDER PAIN: Primary | ICD-10-CM

## 2024-05-16 DIAGNOSIS — M25.511 CHRONIC RIGHT SHOULDER PAIN: Primary | ICD-10-CM

## 2024-05-16 PROCEDURE — 97112 NEUROMUSCULAR REEDUCATION: CPT | Mod: CQ

## 2024-05-16 PROCEDURE — 97110 THERAPEUTIC EXERCISES: CPT | Mod: CQ

## 2024-05-16 PROCEDURE — 97530 THERAPEUTIC ACTIVITIES: CPT | Mod: CQ

## 2024-05-16 NOTE — PROGRESS NOTES
[facility]   Physical Therapy Treatment Note      Name: Carlin Madrigal  Clinic Number: [id number]    Therapy Diagnosis:   Encounter Diagnosis   Name Primary?    Chronic right shoulder pain Yes     Physician: Chandrakant Laughlin MD    Visit Date: 5/16/2024    Physician Orders: PT Eval and Treat: Work Hardening/Conditioning   Medical Diagnosis from Referral:   S06.6X9S (ICD-10-CM) - Subarachnoid hemorrhage following injury, with loss of consciousness, sequela   S06.0X9S (ICD-10-CM) - Concussion with loss of consciousness, sequela   S13.4XXD (ICD-10-CM) - Whiplash injury to neck, subsequent encounter   Evaluation Date: 4/8/2024  Authorization Period Expiration: 10/2/2024  Plan of Care Expiration: 6/28/2024  Visit # / Visits authorized: 12/12, 3/16 (though pending UR, do not schedule f/u until auth rec'd)  PTA: 1/5    FOTO: 1/3     Time In: 8:03am  Time Out: 09:03am  Total Appointment Time: 60 minutes    Precautions: Standard and Diabetes     Reports out of work and has not returned since June of last year.   Company: [employer]   Job title:  - primarily just doing framing work    Subjective     Pt reports: overall his neck is doing really well overall and only having a very minor bit if he pushes it to look all the way to the right, otherwise, no issues and ROM marked improved. R shoulder still primary c/o when reaching above shoulder height, though improving. R shoulder does feel weak overall.   He was compliant with home exercise program.  Response to previous treatment: increased stiffness cervical spine post  Function: activities overhead are a little difficult due to shoulder pain    Pain: 2/10 right side of neck if turn to the right & 2-4/10 pain shoulder with overhead movement   Location:  right side of neck      Objective      Objective Measures updated at progress report unless specified.     Step deformity noted today - R shoulder.   R shoulder ER AROM full and  "no s/s today.   Scapular mobility WFL, no pain.   (+) flexion rotation R cervical testing end ROM, cleared post MET    Cervical Screening:   - Flexion 64 On eval vs currently 70  - Extension 32 with R sided neck pain end ROM On eval vs currently 42  - Rotation 68 L rotation vs 47 R rotation On eval vs currently 74 L rotation and 72 R rotation  - Quadrant R extension with (+) CS R sided neck s/s On eval vs currently (+)  - ttp to R suboccipital mm On eval vs currently (+)  - R  position II 50 vs L 65lbs (RHD) On eval vs currently 70lbs  - Flexion Rotation test (+) to R On eval vs currently (+) min at end ROM; cleared after MWM      Shoulder Screening:   Observations: Pt demonstrates upper crossed posture with head forward and rounded shoulders  Palpation: Pt with ttp to the R RTC region about the R Supraspinatus     Shoulder AROM Right (*) = in degrees Left (*) = in degrees   Flexion 104 On eval vs currently 148 standing and 153 Supine 115   Scaption 82 On eval vs currently 126 124   IR LSJ : functional reaching T8   ER CTJ : functional reaching 74* PROM and painful end ROM On eval vs currently On eval vs currently 80 AROM 83* max PROM limited to pain T4/5      Thoracic Screening: limited AROM extension and rotation R/L at end ranges of motion     Shoulder MMT scores Right: X/5 Left: x/5   Scaption 3+p/5 on eval vs 4-/5 currently within av ROM 4/5    IR 4/5 vs 4+/5 currently within av ROM 5/5    ER 4-p /5 vs 4p/5 currently within av ROM 4+/5      Special testing cluster exams:  RTC pathology: Painful arc, Drop arm sign, Infraspinatus: Met 2/3     Joint testing: JS w/ posterior glide GH joint    Treatment     Carlin received the treatments listed below:      therapeutic exercises to develop strength, endurance, ROM, flexibility, posture, and core stabilization for 22 minutes including:    [] Supine chin tucks: x20 c/ 5" hold  [] Supine right cervical rotations: x20 c/ 5" hold  [x] Right Levator scap stretch: 3x30 " "sec  [x] SNAG right rotation: 2x10 c/ 5 sec hold  [x] SNAG cervical extensions: 1x10 c/5 sec hold  [x] Physioball flexion roll out AAROM x10    manual therapy techniques for 10 minutes, including:    [] R cervical rotational MWM 2x10, contract relax into R rotation x10 progressing ROM; cervical distraction with belt 30" x4  [] R C1/2 MET - side bend max to L, then rotate R to end ROM. Have the pt slightly rotate their head left into you hand, matching you resistance. Hold for 6 seconds, then  slack into R rotation and repeat.   [] Upper Cervical rotation into flexion - flex cervical spine to max flexion, turn head to R within pain free ROM. Have pt slightly rotation L into your hand, matching your resistance. Hold 6 seconds.  slack into R rotation and repeat.   [] R shoulder MWM into flexion x10    neuromuscular re-education activities to improve: Balance, Coordination, Kinesthetic, Sense, Proprioception, and Posture for 30 minutes. The following activities were included:    [x] Circuit alternating activities:   Standing R shoulder Hz shoulder abduction/scaption GTB 2x12  Standing R shoulder pull down RTB/BTB 2x20  Physioball flexion roll out 1'    [x] Circuit alternating activities:   Bent over rows, 20# kb: 2x10  Seated ER to OH press starting from 90* shoulder scaption 2# 2x15 NP  Supine thoracic extension over foam roll x8 f/b along spine 3" hold   Standing serratus wall slides z    [] Circuit alternating activities:   Landmine press 10lb bar starting from treatment table 2x10  Standing wall lower trap lift offs: 3x10  Thoracic rotation open<>close book x12    therapeutic activities to improve functional performance for 8 minutes, including:    [x] Circuit alternating activities:   Strong Carry 25lb KB 3x1'  Strong carry 2lb 90/90 position 3x1'    Patient Education and Home Exercises       Education provided:   - Patient was educated on HEP and on all therapeutic interventions performed during " today's treatment visit.     Home Exercises Provided: Yes & Patient instructed to cont prior HEP. Exercises were reviewed and Carlin was able to demonstrate them prior to the end of the session.  Carlin demonstrated good  understanding of the education provided. See EMR under Patient Instructions for exercises provided during therapy sessions    Assessment     Patient overall continues to progress well with PT intervention at this time endorsing increased R cervical rotational mobility and increased right shoulder stability. Though this is the case, he is still feeling cervical pain and stiffness at end range ipsilaterally. He is also progressing well in terms of his R should dysfunction. He reports pain levels are decreasing; however, still having difficulty with overhead activities. Patient provided good tolerance to therapeutic exercises, keeping in mind that patient was instructed to keep range of motion in pain free range with all therapeutic exercises. Plan to continue to progress load as pt is able to tolerate.      Patient prognosis is: Fair<>Good    Pt will continue to benefit from skilled Physical Therapy interventions in order to address the deficits listed in the problem list box on initial evaluation, provide education, and to address the musculoskeletal limitations and work-related functional deficits for their job as a .    Pt's spiritual, cultural and educational needs considered and pt agreeable to plan of care and goals.     Anticipated barriers to physical therapy: chronicity of current injury     Goals:   LongTerm Goals: 4 weeks:      1.) Pt will become compliant and independent with the initial HEP to promote decreased pain and improved mobility and strength. MET  2.) Pt will become compliant and independent with an updated, progressed HEP to promote decreased pain and improved mobility and strength as well as prep for discharge from further PT intervention.   3.) Pt to  report decrease in pain levels from 10/10 at worse to 3/10 at worse. MET  4.) Pt will improve R shoulder MMT scores by 2 muscle grade to improve functional stability and strength.   5.) Pt will demonstrate improved ability to reach over head with little to no c/o with the left shoulders in order to demonstrate improved functional shoulder mobility and self care.   6.) Pt will report 75%+ of his PLOF to demonstrate return to ADLs and community activities.   7.) Pt will demonstrate ability to lift 50lbs from floor to waist with good body mechanics within the clinic in order to simulate RTW goals.   8.) Pt will demonstrate ability to push and pull 100lbs x100 ft within the clinic in order to simulate RTW goals.    9.) Pt will demonstrate R cervical ROM WFL in order to improve ability to scan environment. MET     Pt will return to work full duty, full time.    Plan   Plan of care Certification: 4/8/2024 to 5/10/2024, 5/10/2024 to 6/28/2024.      Important to note, that the oatient may need an additional bout of PT by end of current POC based on presentation today.     Outpatient Physical Therapy 3 times weekly for 4 weeks to include the following interventions: Manual Therapy, Neuromuscular Re-ed, Patient Education, Self Care, Therapeutic Activities, and Therapeutic Exercise.      Preston Morales, PTA

## 2024-05-17 ENCOUNTER — CLINICAL SUPPORT (OUTPATIENT)
Dept: REHABILITATION | Facility: HOSPITAL | Age: 69
End: 2024-05-17
Payer: OTHER MISCELLANEOUS

## 2024-05-17 DIAGNOSIS — M25.511 CHRONIC RIGHT SHOULDER PAIN: Primary | ICD-10-CM

## 2024-05-17 DIAGNOSIS — G89.29 CHRONIC RIGHT SHOULDER PAIN: Primary | ICD-10-CM

## 2024-05-17 PROCEDURE — 97112 NEUROMUSCULAR REEDUCATION: CPT | Mod: CQ

## 2024-05-17 PROCEDURE — 97110 THERAPEUTIC EXERCISES: CPT | Mod: CQ

## 2024-05-17 PROCEDURE — 97530 THERAPEUTIC ACTIVITIES: CPT | Mod: CQ

## 2024-05-17 NOTE — PROGRESS NOTES
[facility]   Physical Therapy Treatment Note      Name: Carlin Madrigal  Clinic Number: [id number]    Therapy Diagnosis:   Encounter Diagnosis   Name Primary?    Chronic right shoulder pain Yes       Physician: Chandrakant Laughlin MD    Visit Date: 5/17/2024    Physician Orders: PT Eval and Treat: Work Hardening/Conditioning   Medical Diagnosis from Referral:   S06.6X9S (ICD-10-CM) - Subarachnoid hemorrhage following injury, with loss of consciousness, sequela   S06.0X9S (ICD-10-CM) - Concussion with loss of consciousness, sequela   S13.4XXD (ICD-10-CM) - Whiplash injury to neck, subsequent encounter   Evaluation Date: 4/8/2024  Authorization Period Expiration: 10/2/2024  Plan of Care Expiration: 6/28/2024  Visit # / Visits authorized: 12/12, 4/16 (though pending UR, do not schedule f/u until auth rec'd)  PTA: 2/5    FOTO: 1/3     Time In: 8:10am  Time Out: 9:10am  Total Appointment Time: 60 minutes    Precautions: Standard and Diabetes     Reports out of work and has not returned since June of last year.   Company: [employer]   Job title:  - primarily just doing framing work    Subjective     Pt reports: overall his neck is doing really well overall and only having a very minor bit if he pushes it to look all the way to the right, otherwise, no issues and ROM marked improved. R shoulder still primary c/o when reaching above shoulder height, though improving. R shoulder does feel weak overall.   He was compliant with home exercise program.  Response to previous treatment: increased stiffness cervical spine post  Function: activities overhead are a little difficult due to shoulder pain    Pain: 2/10 right side of neck if turn to the right & 2-4/10 pain shoulder with overhead movement   Location:  right side of neck      Objective      Objective Measures updated at progress report unless specified.     Step deformity noted today - R shoulder.   R shoulder ER AROM full and  "no s/s today.   Scapular mobility WFL, no pain.   (+) flexion rotation R cervical testing end ROM, cleared post MET    Cervical Screening:   - Flexion 64 On eval vs currently 70  - Extension 32 with R sided neck pain end ROM On eval vs currently 42  - Rotation 68 L rotation vs 47 R rotation On eval vs currently 74 L rotation and 72 R rotation  - Quadrant R extension with (+) CS R sided neck s/s On eval vs currently (+)  - ttp to R suboccipital mm On eval vs currently (+)  - R  position II 50 vs L 65lbs (RHD) On eval vs currently 70lbs  - Flexion Rotation test (+) to R On eval vs currently (+) min at end ROM; cleared after MWM      Shoulder Screening:  Observations: Pt demonstrates upper crossed posture with head forward and rounded shoulders  Palpation: Pt with ttp to the R RTC region about the R Supraspinatus     Shoulder AROM Right (*) = in degrees Left (*) = in degrees   Flexion 104 On eval vs currently 148 standing and 153 Supine 115   Scaption 82 On eval vs currently 126 124   IR LSJ : functional reaching T8   ER CTJ : functional reaching 74* PROM and painful end ROM On eval vs currently On eval vs currently 80 AROM 83* max PROM limited to pain T4/5      Thoracic Screening: limited AROM extension and rotation R/L at end ranges of motion     Shoulder MMT scores Right: X/5 Left: x/5   Scaption 3+p/5 on eval vs 4-/5 currently within av ROM 4/5    IR 4/5 vs 4+/5 currently within av ROM 5/5    ER 4-p /5 vs 4p/5 currently within av ROM 4+/5      Special testing cluster exams:  RTC pathology: Painful arc, Drop arm sign, Infraspinatus: Met 2/3     Joint testing: JS w/ posterior glide GH joint    Treatment     Carlin received the treatments listed below:      therapeutic exercises to develop strength, endurance, ROM, flexibility, posture, and core stabilization for 20 minutes including:    [] Supine chin tucks: x20 c/ 5" hold  [] Supine right cervical rotations: x20 c/ 5" hold  [x] Right Levator scap stretch: 3x30 " "sec  [x] SNAG right rotation: 2x10 c/ 5 sec hold  [x] SNAG cervical extensions: 1x10 c/5 sec hold  [] Physioball flexion roll out AAROM x10    manual therapy techniques for 10 minutes, including:    [] R cervical rotational MWM 2x10, contract relax into R rotation x10 progressing ROM; cervical distraction with belt 30" x4  [] R C1/2 MET - side bend max to L, then rotate R to end ROM. Have the pt slightly rotate their head left into you hand, matching you resistance. Hold for 6 seconds, then  slack into R rotation and repeat.   [] Upper Cervical rotation into flexion - flex cervical spine to max flexion, turn head to R within pain free ROM. Have pt slightly rotation L into your hand, matching your resistance. Hold 6 seconds.  slack into R rotation and repeat.   [] R shoulder MWM into flexion x10    neuromuscular re-education activities to improve: Balance, Coordination, Kinesthetic, Sense, Proprioception, and Posture for 32 minutes. The following activities were included:    [x] Circuit alternating activities:   Standing R shoulder Hz shoulder abduction/scaption GTB 2x12  Standing R shoulder pull down RTB/BTB 2x20  Physioball flexion roll out 1'    [x] Circuit alternating activities:   Bent over rows, 20# kb: 2x10  Seated ER to OH press starting from 90* shoulder scaption 2# 2x15 NP  Supine thoracic extension over foam roll x8 f/b along spine 3" hold   Standing serratus wall slides z    [] Circuit alternating activities:   Landmine press 10lb bar starting from treatment table 2x10  Standing wall lower trap lift offs: 3x10  Thoracic rotation open<>close book x12      [x]Side lying ER, 2#: x15      therapeutic activities to improve functional performance for 8 minutes, including:    [x] Circuit alternating activities:   Strong Carry 25lb KB 3x1'  Strong carry 2lb 90/90 position 3x1'    Patient Education and Home Exercises       Education provided:   - Patient was educated on HEP and on all therapeutic " interventions performed during today's treatment visit.     Home Exercises Provided: Yes & Patient instructed to cont prior HEP. Exercises were reviewed and Carlin was able to demonstrate them prior to the end of the session.  Carlin demonstrated good  understanding of the education provided. See EMR under Patient Instructions for exercises provided during therapy sessions    Assessment     Patient progress appears to be limited my anterior shoulder joint pain. It is noted that patient endorses increased R cervical rotational mobility and increased right shoulder stability since start of care. Though this is the case, he is still feeling cervical pain and stiffness at end range ipsilaterally. Pt reports pain levels have decreasing but continue to have difficulty with overhead activities. Patient provided good tolerance to therapeutic exercises, keeping in mind that patient was instructed to keep range of motion in pain free range with all therapeutic exercises. Plan to continue to progress load as pt is able to tolerate.      Patient prognosis is: Fair<>Good    Pt will continue to benefit from skilled Physical Therapy interventions in order to address the deficits listed in the problem list box on initial evaluation, provide education, and to address the musculoskeletal limitations and work-related functional deficits for their job as a .    Pt's spiritual, cultural and educational needs considered and pt agreeable to plan of care and goals.     Anticipated barriers to physical therapy: chronicity of current injury     Goals:   LongTerm Goals: 4 weeks:      1.) Pt will become compliant and independent with the initial HEP to promote decreased pain and improved mobility and strength. MET  2.) Pt will become compliant and independent with an updated, progressed HEP to promote decreased pain and improved mobility and strength as well as prep for discharge from further PT intervention.   3.) Pt to  report decrease in pain levels from 10/10 at worse to 3/10 at worse. MET  4.) Pt will improve R shoulder MMT scores by 2 muscle grade to improve functional stability and strength.   5.) Pt will demonstrate improved ability to reach over head with little to no c/o with the left shoulders in order to demonstrate improved functional shoulder mobility and self care.   6.) Pt will report 75%+ of his PLOF to demonstrate return to ADLs and community activities.   7.) Pt will demonstrate ability to lift 50lbs from floor to waist with good body mechanics within the clinic in order to simulate RTW goals.   8.) Pt will demonstrate ability to push and pull 100lbs x100 ft within the clinic in order to simulate RTW goals.    9.) Pt will demonstrate R cervical ROM WFL in order to improve ability to scan environment. MET     Pt will return to work full duty, full time.    Plan   Plan of care Certification: 4/8/2024 to 5/10/2024, 5/10/2024 to 6/28/2024.      Important to note, that the oatient may need an additional bout of PT by end of current POC based on presentation today.     Outpatient Physical Therapy 3 times weekly for 4 weeks to include the following interventions: Manual Therapy, Neuromuscular Re-ed, Patient Education, Self Care, Therapeutic Activities, and Therapeutic Exercise.      Preston Morales, PTA

## 2024-05-21 ENCOUNTER — CLINICAL SUPPORT (OUTPATIENT)
Dept: REHABILITATION | Facility: HOSPITAL | Age: 69
End: 2024-05-21
Payer: OTHER MISCELLANEOUS

## 2024-05-21 DIAGNOSIS — M25.511 CHRONIC RIGHT SHOULDER PAIN: Primary | ICD-10-CM

## 2024-05-21 DIAGNOSIS — G89.29 CHRONIC RIGHT SHOULDER PAIN: Primary | ICD-10-CM

## 2024-05-21 PROCEDURE — 97112 NEUROMUSCULAR REEDUCATION: CPT | Mod: CQ

## 2024-05-21 PROCEDURE — 97110 THERAPEUTIC EXERCISES: CPT | Mod: CQ

## 2024-05-21 NOTE — PROGRESS NOTES
[facility]   Physical Therapy Treatment Note      Name: Carlin Madrigal  Clinic Number: [id number]    Therapy Diagnosis:   Encounter Diagnosis   Name Primary?    Chronic right shoulder pain Yes       Physician: Chandrakant Laughlin MD    Visit Date: 5/21/2024    Physician Orders: PT Eval and Treat: Work Hardening/Conditioning   Medical Diagnosis from Referral:   S06.6X9S (ICD-10-CM) - Subarachnoid hemorrhage following injury, with loss of consciousness, sequela   S06.0X9S (ICD-10-CM) - Concussion with loss of consciousness, sequela   S13.4XXD (ICD-10-CM) - Whiplash injury to neck, subsequent encounter   Evaluation Date: 4/8/2024  Authorization Period Expiration: 10/2/2024  Plan of Care Expiration: 6/28/2024  Visit # / Visits authorized: 12/12, 5/16 (though pending UR, do not schedule f/u until auth rec'd)  PTA: 3/5    FOTO: 1/3     Time In: 1:05pm  Time Out: 2:05pm  Total Appointment Time: 60 minutes    Precautions: Standard and Diabetes     Reports out of work and has not returned since June of last year.   Company: [employer]   Job title:  - primarily just doing framing work    Subjective     Pt reports: that his shoulder hurts when reaching forward and when reaching outward in front of him. Also, overall his neck is doing really well overall and only having a very minor bit if he pushes it to look all the way to the right, otherwise, no issues. and ROM marked improved.  He was compliant with home exercise program.  Response to previous treatment: increased stiffness cervical spine post  Function: activities overhead are a little difficult due to shoulder pain    Pain: 2/10 right side of neck if turn to the right & 2-4/10 pain shoulder with overhead movement   Location:  right side of neck      Objective      Objective Measures updated at progress report unless specified.     Step deformity noted today - R shoulder.   R shoulder ER AROM full and no s/s today.   Scapular  "mobility WFL, no pain.   (+) flexion rotation R cervical testing end ROM, cleared post MET    Cervical Screening:   - Flexion 64 On eval vs currently 70  - Extension 32 with R sided neck pain end ROM On eval vs currently 42  - Rotation 68 L rotation vs 47 R rotation On eval vs currently 74 L rotation and 72 R rotation  - Quadrant R extension with (+) CS R sided neck s/s On eval vs currently (+)  - ttp to R suboccipital mm On eval vs currently (+)  - R  position II 50 vs L 65lbs (RHD) On eval vs currently 70lbs  - Flexion Rotation test (+) to R On eval vs currently (+) min at end ROM; cleared after MWM      Shoulder Screening:  Observations: Pt demonstrates upper crossed posture with head forward and rounded shoulders  Palpation: Pt with ttp to the R RTC region about the R Supraspinatus     Shoulder AROM Right (*) = in degrees Left (*) = in degrees   Flexion 104 On eval vs currently 148 standing and 153 Supine 115   Scaption 82 On eval vs currently 126 124   IR LSJ : functional reaching T8   ER CTJ : functional reaching 74* PROM and painful end ROM On eval vs currently On eval vs currently 80 AROM 83* max PROM limited to pain T4/5      Thoracic Screening: limited AROM extension and rotation R/L at end ranges of motion     Shoulder MMT scores Right: X/5 Left: x/5   Scaption 3+p/5 on eval vs 4-/5 currently within av ROM 4/5    IR 4/5 vs 4+/5 currently within av ROM 5/5    ER 4-p /5 vs 4p/5 currently within av ROM 4+/5      Special testing cluster exams:  RTC pathology: Painful arc, Drop arm sign, Infraspinatus: Met 2/3     Joint testing: JS w/ posterior glide GH joint    Treatment     Carlin received the treatments listed below:      therapeutic exercises to develop strength, endurance, ROM, flexibility, posture, and core stabilization for 20 minutes including:    [x] Standing chin tucks: x20 c/ 5" hold  [x] Supine left cervical rotations: x20 c/ 5" hold  [x] Right Levator scap stretch: 3x30 sec  [x] SNAG right " "rotation: 2x10 c/ 5 sec hold  [x] SNAG cervical extensions: 1x10 c/5 sec hold  [] Physioball flexion roll out AAROM x10    manual therapy techniques for 10 minutes, including:    [] R cervical rotational MWM 2x10, contract relax into R rotation x10 progressing ROM; cervical distraction with belt 30" x4  [] R C1/2 MET - side bend max to L, then rotate R to end ROM. Have the pt slightly rotate their head left into you hand, matching you resistance. Hold for 6 seconds, then  slack into R rotation and repeat.   [] Upper Cervical rotation into flexion - flex cervical spine to max flexion, turn head to R within pain free ROM. Have pt slightly rotation L into your hand, matching your resistance. Hold 6 seconds.  slack into R rotation and repeat.   [] R shoulder MWM into flexion x10    neuromuscular re-education activities to improve: Balance, Coordination, Kinesthetic, Sense, Proprioception, and Posture for 40 minutes. The following activities were included:    [] Circuit alternating activities:   Standing R shoulder Hz shoulder abduction/scaption GTB 2x12  Standing R shoulder pull down RTB/BTB 2x20  Physioball flexion roll out 1'    [x] Circuit alternating activities:   Bent over rows, 20# kb: 2x10  Seated ER to OH press starting from 90* shoulder scaption 2# 2x15 NP  Supine thoracic extension over foam roll x8 f/b along spine 3" hold   Standing serratus wall slides z    [] Circuit alternating activities:   Landmine press 10lb bar starting from treatment table 2x10  Standing wall lower trap lift offs: 3x10  Thoracic rotation open<>close book x12      [x]Side lying ER, 2#: x15  [x]Rows with blue tb: 2x10  [x]Shoulder extensions with blue tb: 2x10  [x]Supine AAROM with dowel into flexion: x20 with 5 sec hold  [x]Table slides into flexion: x20 with 5 sec hold      therapeutic activities to improve functional performance for  minutes, including:    [] Circuit alternating activities:   Strong Carry 25lb KB " 3x1'  Strong carry 2lb 90/90 position 3x1'    Patient Education and Home Exercises       Education provided:   - Patient was educated on HEP and on all therapeutic interventions performed during today's treatment visit.     Home Exercises Provided: Yes & Patient instructed to cont prior HEP. Exercises were reviewed and Carlin was able to demonstrate them prior to the end of the session.  Carlin demonstrated good  understanding of the education provided. See EMR under Patient Instructions for exercises provided during therapy sessions    Assessment     Patient progress appears to be limited my anterior shoulder joint pain. Pt reports pain levels have decreasing but continue to have difficulty with overhead activities. Hence, today's treatment was altered in order to review more basic exercises that patient could do at home in order to promote periscapular/RC stabilization as well as improve shoulder mobility. It is noted that patient endorses increased R cervical rotational mobility and increased right shoulder stability since start of care. Though this is the case, he is still feeling cervical pain and stiffness at end range with ipsilateral cervical rotation. Patient provided good tolerance to therapeutic exercises, keeping in mind that patient was instructed to keep range of motion in pain free range with all therapeutic exercises. Plan to continue to progress load as pt is able to tolerate.      Patient prognosis is: Fair<>Good    Pt will continue to benefit from skilled Physical Therapy interventions in order to address the deficits listed in the problem list box on initial evaluation, provide education, and to address the musculoskeletal limitations and work-related functional deficits for their job as a .    Pt's spiritual, cultural and educational needs considered and pt agreeable to plan of care and goals.     Anticipated barriers to physical therapy: chronicity of current injury      Goals:   LongTerm Goals: 4 weeks:      1.) Pt will become compliant and independent with the initial HEP to promote decreased pain and improved mobility and strength. MET  2.) Pt will become compliant and independent with an updated, progressed HEP to promote decreased pain and improved mobility and strength as well as prep for discharge from further PT intervention.   3.) Pt to report decrease in pain levels from 10/10 at worse to 3/10 at worse. MET  4.) Pt will improve R shoulder MMT scores by 2 muscle grade to improve functional stability and strength.   5.) Pt will demonstrate improved ability to reach over head with little to no c/o with the left shoulders in order to demonstrate improved functional shoulder mobility and self care.   6.) Pt will report 75%+ of his PLOF to demonstrate return to ADLs and community activities.   7.) Pt will demonstrate ability to lift 50lbs from floor to waist with good body mechanics within the clinic in order to simulate RTW goals.   8.) Pt will demonstrate ability to push and pull 100lbs x100 ft within the clinic in order to simulate RTW goals.    9.) Pt will demonstrate R cervical ROM WFL in order to improve ability to scan environment. MET     Pt will return to work full duty, full time.    Plan   Plan of care Certification: 4/8/2024 to 5/10/2024, 5/10/2024 to 6/28/2024.      Important to note, that the oatient may need an additional bout of PT by end of current POC based on presentation today.     Outpatient Physical Therapy 3 times weekly for 4 weeks to include the following interventions: Manual Therapy, Neuromuscular Re-ed, Patient Education, Self Care, Therapeutic Activities, and Therapeutic Exercise.      Preston Morales, PTA

## 2024-05-23 ENCOUNTER — CLINICAL SUPPORT (OUTPATIENT)
Dept: REHABILITATION | Facility: HOSPITAL | Age: 69
End: 2024-05-23
Payer: OTHER MISCELLANEOUS

## 2024-05-23 DIAGNOSIS — G89.29 CHRONIC RIGHT SHOULDER PAIN: Primary | ICD-10-CM

## 2024-05-23 DIAGNOSIS — M25.511 CHRONIC RIGHT SHOULDER PAIN: Primary | ICD-10-CM

## 2024-05-23 PROCEDURE — 97110 THERAPEUTIC EXERCISES: CPT

## 2024-05-23 PROCEDURE — 97140 MANUAL THERAPY 1/> REGIONS: CPT

## 2024-05-23 PROCEDURE — 97112 NEUROMUSCULAR REEDUCATION: CPT

## 2024-05-23 NOTE — PROGRESS NOTES
[facility]   Physical Therapy Treatment Note      Name: Carlin Madrigal  Clinic Number: [id number]    Therapy Diagnosis:   Encounter Diagnosis   Name Primary?    Chronic right shoulder pain Yes     Physician: Chandrakant Laughlin MD    Visit Date: 5/23/2024    Physician Orders: PT Eval and Treat: Work Hardening/Conditioning   Medical Diagnosis from Referral:   S06.6X9S (ICD-10-CM) - Subarachnoid hemorrhage following injury, with loss of consciousness, sequela   S06.0X9S (ICD-10-CM) - Concussion with loss of consciousness, sequela   S13.4XXD (ICD-10-CM) - Whiplash injury to neck, subsequent encounter   Evaluation Date: 4/8/2024  Authorization Period Expiration: 10/2/2024  Plan of Care Expiration: 6/28/2024  Visit # / Visits authorized: 12/12, 7/16 (though pending UR, do not schedule f/u until auth rec'd)  PTA: 0/5    FOTO: 1/3     Time In: 10:00am  Time Out: 11:00am  Total Appointment Time: 60 minutes    Precautions: Standard and Diabetes     Reports out of work and has not returned since June of last year.   Company: [employer]   Job title:  - primarily just doing framing work    Subjective     Pt reports: that he was moving some boxes yesterday to help his son and reports increase in neck pain post. A little better today, but now his neck is really tight and stiff. Having trouble looking right > left. Worried that he will not be able to move around boxes or anything heavy for work and fear of re-injury.     He was compliant with home exercise program.  Response to previous treatment: increased stiffness cervical spine post  Function: activities overhead are a little difficult due to shoulder pain    Pain: 6/10 right side of neck if turn to the right & 2-4/10 pain shoulder with overhead movement   Location:  right side of neck      Objective      Objective Measures updated at progress report unless specified.     Cervical rotation L 52 vs R 46 and painful  Consistent with  observations with functional rotation, moving torso, t spine    tep deformity noted today - R shoulder.   R shoulder ER AROM full and no s/s today.   Scapular mobility WFL, no pain.   (+) flexion rotation R cervical testing end ROM, cleared post MET    Cervical Screening:   - Flexion 64 On eval vs currently 70  - Extension 32 with R sided neck pain end ROM On eval vs currently 42  - Rotation 68 L rotation vs 47 R rotation On eval vs currently 74 L rotation and 72 R rotation  - Quadrant R extension with (+) CS R sided neck s/s On eval vs currently (+)  - ttp to R suboccipital mm On eval vs currently (+)  - R  position II 50 vs L 65lbs (RHD) On eval vs currently 70lbs  - Flexion Rotation test (+) to R On eval vs currently (+) min at end ROM; cleared after MWM      Shoulder Screening:  Observations: Pt demonstrates upper crossed posture with head forward and rounded shoulders  Palpation: Pt with ttp to the R RTC region about the R Supraspinatus     Shoulder AROM Right (*) = in degrees Left (*) = in degrees   Flexion 104 On eval vs currently 148 standing and 153 Supine 115   Scaption 82 On eval vs currently 126 124   IR LSJ : functional reaching T8   ER CTJ : functional reaching 74* PROM and painful end ROM On eval vs currently On eval vs currently 80 AROM 83* max PROM limited to pain T4/5      Thoracic Screening: limited AROM extension and rotation R/L at end ranges of motion     Shoulder MMT scores Right: X/5 Left: x/5   Scaption 3+p/5 on eval vs 4-/5 currently within av ROM 4/5    IR 4/5 vs 4+/5 currently within av ROM 5/5    ER 4-p /5 vs 4p/5 currently within av ROM 4+/5      Special testing cluster exams:  RTC pathology: Painful arc, Drop arm sign, Infraspinatus: Met 2/3     Joint testing: JS w/ posterior glide GH joint    Treatment     Carlin received the treatments listed below:      therapeutic exercises to develop strength, endurance, ROM, flexibility, posture, and core stabilization for 15 minutes  "including:    [x] Standing chin tucks: x20 c/ 5" hold  [x] Supine left cervical rotations: x20 c/ 5" hold  [x] Right Levator scap stretch: 3x30 sec  [x] SNAG right rotation: 2x10 c/ 5 sec hold  [x] SNAG cervical extensions: 1x10 c/5 sec hold  [] Physioball flexion roll out AAROM x10    manual therapy techniques for 30 minutes, including:    [x] R cervical rotational MWM 2x10, contract relax into R rotation x10 progressing ROM  [x] R C1/2 MET - side bend max to L, then rotate R to end ROM. Have the pt slightly rotate their head left into you hand, matching you resistance. Hold for 6 seconds, then  slack into R rotation and repeat.   [x] Upper Cervical rotation into flexion - flex cervical spine to max flexion, turn head to R within pain free ROM. Have pt slightly rotation L into your hand, matching your resistance. Hold 6 seconds.  slack into R rotation and repeat.   [x] Cervical upglides R C3-5 Gr IV- x20 each with intermittent STM   [x] Cervical sideglides L C3/5 Gr IV- x20 each with intermittent STM   [] R shoulder MWM into flexion x10      neuromuscular re-education activities to improve: Balance, Coordination, Kinesthetic, Sense, Proprioception, and Posture for 15 minutes. The following activities were included:    [] Circuit alternating activities:   Standing R shoulder Hz shoulder abduction/scaption GTB 2x12  Standing R shoulder pull down RTB/BTB 2x20  Physioball flexion roll out 1'    [] Circuit alternating activities:   Bent over rows, 20# kb: 2x10  Seated ER to OH press starting from 90* shoulder scaption 2# 2x15 NP  Supine thoracic extension over foam roll x8 f/b along spine 3" hold   Standing serratus wall slides z    [x] Circuit alternating activities:   Landmine press 10lb bar starting from treatment table 2x10  Standing wall lower trap lift offs: 3x10  Thoracic rotation open<>close book x12      []Side lying ER, 2#: x15  [x]Rows with blue tb: 2x10  [x]Shoulder extensions with blue tb: " 2x10  []Supine AAROM with dowel into flexion: x20 with 5 sec hold  []Table slides into flexion: x20 with 5 sec hold  [x]Prone low trap level 1 2x10     therapeutic activities to improve functional performance for  minutes, including:    [] Circuit alternating activities:   Strong Carry 25lb KB 3x1'  Strong carry 2lb 90/90 position 3x1'    Patient Education and Home Exercises       Education provided:   - Patient was educated on HEP and on all therapeutic interventions performed during today's treatment visit.     Home Exercises Provided: Yes & Patient instructed to cont prior HEP. Exercises were reviewed and Carlin was able to demonstrate them prior to the end of the session.  Carlin demonstrated good  understanding of the education provided. See EMR under Patient Instructions for exercises provided during therapy sessions    Assessment     Pt reported to session today with increased neck stiffness and pain due to reports of helping his son move boxes, all light boxes, but over a hundred. Reports that after he completed the moving, he felt an increase in neck stiffness consistent with ROM measures today at 46* upon entry R cervical rotation. Test re-test performed after each manual technique. Pt left session with increased R cervical rotation to 64* and reports of feeling much looser. Discussed HEP change over the weekend to cervical snags, prone low trap level 1, and serratus wall slides. Plan to continue to progress load as pt is able to tolerate.      Patient prognosis is: Fair<>Good    Pt will continue to benefit from skilled Physical Therapy interventions in order to address the deficits listed in the problem list box on initial evaluation, provide education, and to address the musculoskeletal limitations and work-related functional deficits for their job as a .    Pt's spiritual, cultural and educational needs considered and pt agreeable to plan of care and goals.     Anticipated barriers  to physical therapy: chronicity of current injury     Goals:   LongTerm Goals: 4 weeks:      1.) Pt will become compliant and independent with the initial HEP to promote decreased pain and improved mobility and strength. MET  2.) Pt will become compliant and independent with an updated, progressed HEP to promote decreased pain and improved mobility and strength as well as prep for discharge from further PT intervention.   3.) Pt to report decrease in pain levels from 10/10 at worse to 3/10 at worse. MET  4.) Pt will improve R shoulder MMT scores by 2 muscle grade to improve functional stability and strength.   5.) Pt will demonstrate improved ability to reach over head with little to no c/o with the left shoulders in order to demonstrate improved functional shoulder mobility and self care.   6.) Pt will report 75%+ of his PLOF to demonstrate return to ADLs and community activities.   7.) Pt will demonstrate ability to lift 50lbs from floor to waist with good body mechanics within the clinic in order to simulate RTW goals.   8.) Pt will demonstrate ability to push and pull 100lbs x100 ft within the clinic in order to simulate RTW goals.    9.) Pt will demonstrate R cervical ROM WFL in order to improve ability to scan environment. MET     Pt will return to work full duty, full time.    Plan   Plan of care Certification: 4/8/2024 to 5/10/2024, 5/10/2024 to 6/28/2024.      Important to note, that the oatient may need an additional bout of PT by end of current POC based on presentation today.     Outpatient Physical Therapy 3 times weekly for 4 weeks to include the following interventions: Manual Therapy, Neuromuscular Re-ed, Patient Education, Self Care, Therapeutic Activities, and Therapeutic Exercise.      Yaron Celis, PT

## 2024-05-27 ENCOUNTER — CLINICAL SUPPORT (OUTPATIENT)
Dept: REHABILITATION | Facility: HOSPITAL | Age: 69
End: 2024-05-27
Payer: OTHER MISCELLANEOUS

## 2024-05-27 DIAGNOSIS — G89.29 CHRONIC RIGHT SHOULDER PAIN: Primary | ICD-10-CM

## 2024-05-27 DIAGNOSIS — M25.511 CHRONIC RIGHT SHOULDER PAIN: Primary | ICD-10-CM

## 2024-05-27 PROCEDURE — 97112 NEUROMUSCULAR REEDUCATION: CPT | Mod: CQ

## 2024-05-27 PROCEDURE — 97110 THERAPEUTIC EXERCISES: CPT | Mod: CQ

## 2024-05-27 NOTE — PROGRESS NOTES
"  [facility]   Physical Therapy Treatment Note      Name: Carlin Madrigal  Clinic Number: [id number]    Therapy Diagnosis:   Encounter Diagnosis   Name Primary?    Chronic right shoulder pain Yes       Physician: Chandrakant Laughlin MD    Visit Date: 5/27/2024    Physician Orders: PT Eval and Treat: Work Hardening/Conditioning   Medical Diagnosis from Referral:   S06.6X9S (ICD-10-CM) - Subarachnoid hemorrhage following injury, with loss of consciousness, sequela   S06.0X9S (ICD-10-CM) - Concussion with loss of consciousness, sequela   S13.4XXD (ICD-10-CM) - Whiplash injury to neck, subsequent encounter   Evaluation Date: 4/8/2024  Authorization Period Expiration: 10/2/2024  Plan of Care Expiration: 6/28/2024  Visit # / Visits authorized: 12/12, 7/16 (though pending UR, do not schedule f/u until auth rec'd)  PTA: 1/5    FOTO: 1/3     Time In: 7:55am  Time Out: 9:05am  Total Appointment Time: 70 minutes    Precautions: Standard and Diabetes     Reports out of work and has not returned since June of last year.   Company: [employer]   Job title:  - primarily just doing framing work    Subjective     Pt reports: that his neck felt much better after the manual received during the previous treatment visit.   He was compliant with home exercise program.  Response to previous treatment: increased stiffness cervical spine post  Function: activities overhead are a little difficult due to shoulder pain    Pain: 2/10 right side of neck if turn to the right & 5/10 pain shoulder with overhead movement   Location:  right side of neck      Objective      Objective Measures updated at progress report unless specified.     Treatment     Carlin received the treatments listed below:      therapeutic exercises to develop strength, endurance, ROM, flexibility, posture, and core stabilization for 25 minutes including:    [x] Standing chin tucks: x20 c/ 5" hold  [x] Supine left cervical " "rotations: x20 c/ 5" hold  [x] Right Levator scap stretch: 3x30 sec  [x] SNAG right rotation: 10x10"   [x] SNAG cervical extensions: 10x10"   [] Physioball flexion roll out AAROM x10  [x]Pulleys fwd/abd 3 min each    manual therapy techniques for 10 minutes, including:    [] R cervical rotational MWM 2x10, contract relax into R rotation x10 progressing ROM; cervical distraction with belt 30" x4  [] R C1/2 MET - side bend max to L, then rotate R to end ROM. Have the pt slightly rotate their head left into you hand, matching you resistance. Hold for 6 seconds, then  slack into R rotation and repeat.   [] Upper Cervical rotation into flexion - flex cervical spine to max flexion, turn head to R within pain free ROM. Have pt slightly rotation L into your hand, matching your resistance. Hold 6 seconds.  slack into R rotation and repeat.   [] R shoulder MWM into flexion x10    neuromuscular re-education activities to improve: Balance, Coordination, Kinesthetic, Sense, Proprioception, and Posture for 45 minutes. The following activities were included:    [] Circuit alternating activities:   Standing R shoulder Hz shoulder abduction/scaption GTB 2x12  Standing R shoulder pull down RTB/BTB 2x20  Physioball flexion roll out 1'    [x] Circuit alternating activities: 3x  Bent over rows, 20# kb: x10  Seated ER to OH press starting from 90* shoulder scaption 2# 2x15 NP  Supine thoracic extension over foam roll x8 f/b along spine 3" hold   Standing serratus wall slides NP  Serratus push up plus in the plank postion: x6    [x] Circuit alternating activities:   Landmine press 10lb bar starting from treatment table 2x10  Standing wall lower trap lift offs: 3x10  Seated ER in 90* abd (elbow supported on plinth + 1 Airex pad, 2#: x12  Thoracic rotation open<>close book x12 np    [x] Circuit alternating activities: 2x  B ER with yellow tb: x10  Right shoulder scaption: x10      []Side lying ER, 2#: x15  []Rows with blue tb: " 2x10  []Shoulder extensions with blue tb: 2x10  []Supine AAROM with dowel into flexion: x20 with 5 sec hold  []Table slides into flexion: x20 with 5 sec hold    therapeutic activities to improve functional performance for 0 minutes, including:    [] Circuit alternating activities:   Strong Carry 25lb KB 3x1'  Strong carry 2lb 90/90 position 3x1'    Patient Education and Home Exercises       Education provided:   - Patient was educated on HEP and on all therapeutic interventions performed during today's treatment visit.     Home Exercises Provided: Yes & Patient instructed to cont prior HEP. Exercises were reviewed and Carlin was able to demonstrate them prior to the end of the session.  Carlin demonstrated good  understanding of the education provided. See EMR under Patient Instructions for exercises provided during therapy sessions    Assessment     Patient progress appears to be limited by anterior shoulder joint pain. Pt reports pain levels have decreasing but continue to have difficulty with overhead activities. It is noted that patient endorses increased R cervical rotational mobility and increased right shoulder stability since start of care. Though this is the case, he is still feeling slight cervical pain and stiffness at end range with ipsilateral cervical rotation. Patient provided good tolerance to therapeutic exercises, keeping in mind that patient was instructed to keep range of motion in pain free range with all therapeutic exercises. Plan to continue to progress load as pt is able to tolerate.      Patient prognosis is: Fair<>Good    Pt will continue to benefit from skilled Physical Therapy interventions in order to address the deficits listed in the problem list box on initial evaluation, provide education, and to address the musculoskeletal limitations and work-related functional deficits for their job as a .    Pt's spiritual, cultural and educational needs considered and pt  agreeable to plan of care and goals.     Anticipated barriers to physical therapy: chronicity of current injury     Goals:   LongTerm Goals: 4 weeks:      1.) Pt will become compliant and independent with the initial HEP to promote decreased pain and improved mobility and strength. MET  2.) Pt will become compliant and independent with an updated, progressed HEP to promote decreased pain and improved mobility and strength as well as prep for discharge from further PT intervention.   3.) Pt to report decrease in pain levels from 10/10 at worse to 3/10 at worse. MET  4.) Pt will improve R shoulder MMT scores by 2 muscle grade to improve functional stability and strength.   5.) Pt will demonstrate improved ability to reach over head with little to no c/o with the left shoulders in order to demonstrate improved functional shoulder mobility and self care.   6.) Pt will report 75%+ of his PLOF to demonstrate return to ADLs and community activities.   7.) Pt will demonstrate ability to lift 50lbs from floor to waist with good body mechanics within the clinic in order to simulate RTW goals.   8.) Pt will demonstrate ability to push and pull 100lbs x100 ft within the clinic in order to simulate RTW goals.    9.) Pt will demonstrate R cervical ROM WFL in order to improve ability to scan environment. MET     Pt will return to work full duty, full time.    Plan   Plan of care Certification: 4/8/2024 to 5/10/2024, 5/10/2024 to 6/28/2024.      Important to note, that the oatient may need an additional bout of PT by end of current POC based on presentation today.     Outpatient Physical Therapy 3 times weekly for 4 weeks to include the following interventions: Manual Therapy, Neuromuscular Re-ed, Patient Education, Self Care, Therapeutic Activities, and Therapeutic Exercise.      Preston Morales, PTA

## 2024-05-29 ENCOUNTER — CLINICAL SUPPORT (OUTPATIENT)
Dept: REHABILITATION | Facility: HOSPITAL | Age: 69
End: 2024-05-29
Payer: OTHER MISCELLANEOUS

## 2024-05-29 DIAGNOSIS — M25.511 CHRONIC RIGHT SHOULDER PAIN: ICD-10-CM

## 2024-05-29 DIAGNOSIS — G89.29 CHRONIC RIGHT SHOULDER PAIN: ICD-10-CM

## 2024-05-29 DIAGNOSIS — R29.898 DECREASED ROM OF NECK: Primary | ICD-10-CM

## 2024-05-29 PROCEDURE — 97112 NEUROMUSCULAR REEDUCATION: CPT | Mod: CQ

## 2024-05-29 PROCEDURE — 97110 THERAPEUTIC EXERCISES: CPT | Mod: CQ

## 2024-05-29 NOTE — PROGRESS NOTES
[facility]   Physical Therapy Treatment Note      Name: Carlin Madrigal  Clinic Number: [id number]    Therapy Diagnosis:   Encounter Diagnoses   Name Primary?    Decreased ROM of neck Yes    Chronic right shoulder pain          Physician: Chandrakant Laughlin MD    Visit Date: 5/29/2024    Physician Orders: PT Eval and Treat: Work Hardening/Conditioning   Medical Diagnosis from Referral:   S06.6X9S (ICD-10-CM) - Subarachnoid hemorrhage following injury, with loss of consciousness, sequela   S06.0X9S (ICD-10-CM) - Concussion with loss of consciousness, sequela   S13.4XXD (ICD-10-CM) - Whiplash injury to neck, subsequent encounter   Evaluation Date: 4/8/2024  Authorization Period Expiration: 10/2/2024  Plan of Care Expiration: 6/28/2024  Visit # / Visits authorized: 12/12, 8/16 (though pending UR, do not schedule f/u until auth rec'd)  PTA: 2/5    FOTO: 1/3     Time In: 8:00am  Time Out: 9:10am  Total Appointment Time: 70 minutes    Precautions: Standard and Diabetes     Reports out of work and has not returned since June of last year.   Company: [employer]   Job title:  - primarily just doing framing work    Subjective     Pt reports: that his neck feels good today with only a very slight pain when rotating to the right, but has min to mod pain in right shoulder when moving right shoulder between 80* to 100* of flexion.   He was compliant with home exercise program.  Response to previous treatment: increased stiffness cervical spine post  Function: activities overhead are a little difficult due to shoulder pain    Pain: 1/10 right side of neck if turn to the right & 4/10 pain shoulder with overhead movement   Location:  right side of neck      Objective      Objective Measures updated at progress report unless specified.     Treatment     Carlin received the treatments listed below:      therapeutic exercises to develop strength, endurance, ROM, flexibility, posture, and  "core stabilization for 15 minutes including:    [x] Standing chin tucks: x20 c/ 5" hold  [x] Supine left cervical rotations: x20 c/ 5" hold  [x] Right Levator scap stretch: 3x30 sec  [x] SNAG right rotation: 10x10"   [] SNAG cervical extensions: 10x10"   [] Physioball flexion roll out AAROM x10  []Pulleys fwd/abd 3 min each    manual therapy techniques for 10 minutes, including:    [] R cervical rotational MWM 2x10, contract relax into R rotation x10 progressing ROM; cervical distraction with belt 30" x4  [] R C1/2 MET - side bend max to L, then rotate R to end ROM. Have the pt slightly rotate their head left into you hand, matching you resistance. Hold for 6 seconds, then  slack into R rotation and repeat.   [] Upper Cervical rotation into flexion - flex cervical spine to max flexion, turn head to R within pain free ROM. Have pt slightly rotation L into your hand, matching your resistance. Hold 6 seconds.  slack into R rotation and repeat.   [] R shoulder MWM into flexion x10    neuromuscular re-education activities to improve: Balance, Coordination, Kinesthetic, Sense, Proprioception, and Posture for 55 minutes. The following activities were included:    [] Circuit alternating activities:   Standing R shoulder Hz shoulder abduction/scaption GTB 2x12  Standing R shoulder pull down RTB/BTB 2x20  Physioball flexion roll out 1'    [x] Circuit alternating activities: 3x  Bent over rows, 20# kb: x10  Seated ER to OH press starting from 90* shoulder scaption 2# 2x15 NP  Supine thoracic extension over foam roll x8 f/b along spine 3" hold   Standing serratus wall slides NP  Serratus push up plus in the plank postion: x6    [x] Circuit alternating activities:   Landmine press 10lb bar starting from treatment table 2x10  Standing wall lower trap lift offs: 3x10  Seated ER in 90* abd (elbow supported on plinth + 1 Airex pad, 2#: x12  Thoracic rotation open<>close book x12 np    [x] Circuit alternating " activities: 2x  B ER with yellow tb: x10  Right shoulder scaption: x10 np  Rows with blue tb: x10  Shoulder extensions with blue tb: x10    []Side lying ER, 2#: x15  []Supine AAROM with dowel into flexion: x20 with 5 sec hold  []Table slides into flexion: x20 with 5 sec hold    therapeutic activities to improve functional performance for 0 minutes, including:    [] Circuit alternating activities:   Strong Carry 25lb KB 3x1'  Strong carry 2lb 90/90 position 3x1'    Patient Education and Home Exercises       Education provided:   - Patient was educated on HEP and on all therapeutic interventions performed during today's treatment visit.     Home Exercises Provided: Yes & Patient instructed to cont prior HEP. Exercises were reviewed and Carlin was able to demonstrate them prior to the end of the session.  Carlin demonstrated good  understanding of the education provided. See EMR under Patient Instructions for exercises provided during therapy sessions    Assessment     Patient progress appears to be limited by anterior shoulder joint pain. Pt reports pain levels have decreasing but continue to have difficulty with overhead activities. It is noted that patient endorses increased R cervical rotational mobility and increased right shoulder stability since start of care. Though this is the case, he is still feeling slight cervical pain and stiffness at end range with ipsilateral cervical rotation. Patient provided good tolerance to therapeutic exercises, keeping in mind that patient was instructed to keep range of motion in pain free range with all therapeutic exercises. Plan to continue to progress load as pt is able to tolerate.      Patient prognosis is: Fair<>Good    Pt will continue to benefit from skilled Physical Therapy interventions in order to address the deficits listed in the problem list box on initial evaluation, provide education, and to address the musculoskeletal limitations and work-related functional  deficits for their job as a .    Pt's spiritual, cultural and educational needs considered and pt agreeable to plan of care and goals.     Anticipated barriers to physical therapy: chronicity of current injury     Goals:   LongTerm Goals: 4 weeks:      1.) Pt will become compliant and independent with the initial HEP to promote decreased pain and improved mobility and strength. MET  2.) Pt will become compliant and independent with an updated, progressed HEP to promote decreased pain and improved mobility and strength as well as prep for discharge from further PT intervention.   3.) Pt to report decrease in pain levels from 10/10 at worse to 3/10 at worse. MET  4.) Pt will improve R shoulder MMT scores by 2 muscle grade to improve functional stability and strength.   5.) Pt will demonstrate improved ability to reach over head with little to no c/o with the left shoulders in order to demonstrate improved functional shoulder mobility and self care.   6.) Pt will report 75%+ of his PLOF to demonstrate return to ADLs and community activities.   7.) Pt will demonstrate ability to lift 50lbs from floor to waist with good body mechanics within the clinic in order to simulate RTW goals.   8.) Pt will demonstrate ability to push and pull 100lbs x100 ft within the clinic in order to simulate RTW goals.    9.) Pt will demonstrate R cervical ROM WFL in order to improve ability to scan environment. MET     Pt will return to work full duty, full time.    Plan   Plan of care Certification: 4/8/2024 to 5/10/2024, 5/10/2024 to 6/28/2024.      Important to note, that the oatient may need an additional bout of PT by end of current POC based on presentation today.     Outpatient Physical Therapy 3 times weekly for 4 weeks to include the following interventions: Manual Therapy, Neuromuscular Re-ed, Patient Education, Self Care, Therapeutic Activities, and Therapeutic Exercise.      Preston Morales, PTA

## 2024-05-31 ENCOUNTER — CLINICAL SUPPORT (OUTPATIENT)
Dept: REHABILITATION | Facility: HOSPITAL | Age: 69
End: 2024-05-31
Payer: OTHER MISCELLANEOUS

## 2024-05-31 DIAGNOSIS — M25.511 CHRONIC RIGHT SHOULDER PAIN: Primary | ICD-10-CM

## 2024-05-31 DIAGNOSIS — G89.29 CHRONIC RIGHT SHOULDER PAIN: Primary | ICD-10-CM

## 2024-05-31 PROCEDURE — 97110 THERAPEUTIC EXERCISES: CPT | Mod: CQ

## 2024-05-31 PROCEDURE — 97112 NEUROMUSCULAR REEDUCATION: CPT | Mod: CQ

## 2024-05-31 NOTE — PROGRESS NOTES
[facility]   Physical Therapy Treatment Note      Name: Carlin Madrigal  Clinic Number: [id number]    Therapy Diagnosis:   Encounter Diagnosis   Name Primary?    Chronic right shoulder pain Yes       Physician: Chandraaknt Laughlin MD    Visit Date: 5/31/2024    Physician Orders: PT Eval and Treat: Work Hardening/Conditioning   Medical Diagnosis from Referral:   S06.6X9S (ICD-10-CM) - Subarachnoid hemorrhage following injury, with loss of consciousness, sequela   S06.0X9S (ICD-10-CM) - Concussion with loss of consciousness, sequela   S13.4XXD (ICD-10-CM) - Whiplash injury to neck, subsequent encounter   Evaluation Date: 4/8/2024  Authorization Period Expiration: 10/2/2024  Plan of Care Expiration: 6/28/2024  Visit # / Visits authorized: 12/12, 9/12 (though pending UR, do not schedule f/u until auth rec'd)  PTA: 3/5    FOTO: 1/3     Time In: 8:10am  Time Out: 9:25am  Total Appointment Time: 75 minutes    Precautions: Standard and Diabetes     Reports out of work and has not returned since June of last year.   Company: [employer]   Job title:  - primarily just doing framing work    Subjective     Pt reports: that his neck feels good today with only a very slight pain when rotating to the right, but has min to mod pain in right shoulder when moving right shoulder between 80* to 100* of flexion.   He was compliant with home exercise program.  Response to previous treatment: increased stiffness cervical spine post  Function: activities overhead are a little difficult due to shoulder pain    Pain: 1/10 right side of neck if turn to the right & 4/10 pain shoulder with overhead movement   Location:  right side of neck      Objective      Objective Measures updated at progress report unless specified.     Treatment     Carlin received the treatments listed below:      therapeutic exercises to develop strength, endurance, ROM, flexibility, posture, and core stabilization for 25  "minutes including:    [x] Standing chin tucks: x20 c/ 5" hold  [] Supine left cervical rotations: x20 c/ 5" hold  [x] Right Levator scap stretch: 3x30 sec  [x] SNAG right rotation: 10x10"   [] SNAG cervical extensions: 10x10"   [x] Physioball flexion roll out AAROM 10x10"  [x]Pulleys fwd/abd 3 min each    manual therapy techniques for 0 minutes, including:    [] R cervical rotational MWM 2x10, contract relax into R rotation x10 progressing ROM; cervical distraction with belt 30" x4  [] R C1/2 MET - side bend max to L, then rotate R to end ROM. Have the pt slightly rotate their head left into you hand, matching you resistance. Hold for 6 seconds, then  slack into R rotation and repeat.   [] Upper Cervical rotation into flexion - flex cervical spine to max flexion, turn head to R within pain free ROM. Have pt slightly rotation L into your hand, matching your resistance. Hold 6 seconds.  slack into R rotation and repeat.   [] R shoulder MWM into flexion x10    neuromuscular re-education activities to improve: Balance, Coordination, Kinesthetic, Sense, Proprioception, and Posture for 50 minutes. The following activities were included:    [] Circuit alternating activities:   Standing R shoulder Hz shoulder abduction/scaption GTB 2x12  Standing R shoulder pull down RTB/BTB 2x20  Physioball flexion roll out 1'    [x] Circuit alternating activities: 3x  Bent over rows, 20# kb: x10  Serratus punches 10#, x15  Seated ER to OH press starting from 90* shoulder scaption 2# 2x15 NP  Supine thoracic extension over foam roll x8 f/b along spine 3" hold NP  Standing serratus wall slides NP  Serratus push up plus in the plank postion: x6 NP    [x] Circuit alternating activities: 3x  Landmine press 10lb bar starting from treatment table 2x10 np  Standing wall lower trap lift offs: x10  Seated ER in 90* abd (elbow supported on plinth + 1 Airex pad, 2#: x12    [x] Circuit alternating activities: 2x  B ER with yellow tb: " x10  Right shoulder scaption: x10 np  Rows with blue tb: x12  Shoulder extensions with green tb: x12    [x]Thoracic rotation open<>close book 2x12   []Side lying ER, 2#: x15  []Supine AAROM with dowel into flexion: x20 with 5 sec hold  []Table slides into flexion: x20 with 5 sec hold    therapeutic activities to improve functional performance for 0 minutes, including:    [] Circuit alternating activities:   Strong Carry 25lb KB 3x1'  Strong carry 2lb 90/90 position 3x1'    Patient Education and Home Exercises       Education provided:   - Patient was educated on HEP and on all therapeutic interventions performed during today's treatment visit.     Home Exercises Provided: Yes & Patient instructed to cont prior HEP. Exercises were reviewed and Carlin was able to demonstrate them prior to the end of the session.  Carlin demonstrated good  understanding of the education provided. See EMR under Patient Instructions for exercises provided during therapy sessions    Assessment     Patient progress appears to be limited by anterior shoulder joint pain. Pt reports right shoulder pain levels have decreased but continue to have difficulty with reaching and overhead activities. It is noted that patient endorses increased R cervical rotational mobility and increased right shoulder stability since start of care. Though this is the case, he is still feeling slight cervical pain and stiffness at end range with ipsilateral cervical rotation. Patient provided good tolerance to therapeutic exercises, keeping in mind that patient was instructed to keep range of motion in pain free range with all therapeutic exercises. Plan to continue to progress load as pt is able to tolerate.      Patient prognosis is: Fair<>Good    Pt will continue to benefit from skilled Physical Therapy interventions in order to address the deficits listed in the problem list box on initial evaluation, provide education, and to address the musculoskeletal  limitations and work-related functional deficits for their job as a .    Pt's spiritual, cultural and educational needs considered and pt agreeable to plan of care and goals.     Anticipated barriers to physical therapy: chronicity of current injury     Goals:   LongTerm Goals: 4 weeks:      1.) Pt will become compliant and independent with the initial HEP to promote decreased pain and improved mobility and strength. MET  2.) Pt will become compliant and independent with an updated, progressed HEP to promote decreased pain and improved mobility and strength as well as prep for discharge from further PT intervention.   3.) Pt to report decrease in pain levels from 10/10 at worse to 3/10 at worse. MET  4.) Pt will improve R shoulder MMT scores by 2 muscle grade to improve functional stability and strength.   5.) Pt will demonstrate improved ability to reach over head with little to no c/o with the left shoulders in order to demonstrate improved functional shoulder mobility and self care.   6.) Pt will report 75%+ of his PLOF to demonstrate return to ADLs and community activities.   7.) Pt will demonstrate ability to lift 50lbs from floor to waist with good body mechanics within the clinic in order to simulate RTW goals.   8.) Pt will demonstrate ability to push and pull 100lbs x100 ft within the clinic in order to simulate RTW goals.    9.) Pt will demonstrate R cervical ROM WFL in order to improve ability to scan environment. MET     Pt will return to work full duty, full time.    Plan   Plan of care Certification: 4/8/2024 to 5/10/2024, 5/10/2024 to 6/28/2024.      Important to note, that the oatient may need an additional bout of PT by end of current POC based on presentation today.     Outpatient Physical Therapy 3 times weekly for 4 weeks to include the following interventions: Manual Therapy, Neuromuscular Re-ed, Patient Education, Self Care, Therapeutic Activities, and Therapeutic Exercise.       Preston Morales, PTA

## 2024-06-05 ENCOUNTER — CLINICAL SUPPORT (OUTPATIENT)
Dept: REHABILITATION | Facility: HOSPITAL | Age: 69
End: 2024-06-05
Payer: OTHER MISCELLANEOUS

## 2024-06-05 ENCOUNTER — OFFICE VISIT (OUTPATIENT)
Dept: NEUROLOGY | Facility: CLINIC | Age: 69
End: 2024-06-05
Payer: OTHER MISCELLANEOUS

## 2024-06-05 VITALS
BODY MASS INDEX: 28.23 KG/M2 | DIASTOLIC BLOOD PRESSURE: 69 MMHG | SYSTOLIC BLOOD PRESSURE: 117 MMHG | HEART RATE: 75 BPM | WEIGHT: 202.38 LBS

## 2024-06-05 DIAGNOSIS — M25.511 CHRONIC RIGHT SHOULDER PAIN: ICD-10-CM

## 2024-06-05 DIAGNOSIS — M54.2 CERVICALGIA OF OCCIPITO-ATLANTO-AXIAL REGION: ICD-10-CM

## 2024-06-05 DIAGNOSIS — G89.29 CHRONIC RIGHT SHOULDER PAIN: Primary | ICD-10-CM

## 2024-06-05 DIAGNOSIS — S06.0X9S CONCUSSION WITH LOSS OF CONSCIOUSNESS, SEQUELA: Primary | ICD-10-CM

## 2024-06-05 DIAGNOSIS — M25.511 CHRONIC RIGHT SHOULDER PAIN: Primary | ICD-10-CM

## 2024-06-05 DIAGNOSIS — S06.6X9S SUBARACHNOID HEMORRHAGE FOLLOWING INJURY, WITH LOSS OF CONSCIOUSNESS, SEQUELA: ICD-10-CM

## 2024-06-05 DIAGNOSIS — G44.86 CERVICOGENIC HEADACHE: ICD-10-CM

## 2024-06-05 DIAGNOSIS — G89.29 CHRONIC RIGHT SHOULDER PAIN: ICD-10-CM

## 2024-06-05 PROCEDURE — 97110 THERAPEUTIC EXERCISES: CPT | Mod: CQ

## 2024-06-05 PROCEDURE — 97112 NEUROMUSCULAR REEDUCATION: CPT | Mod: CQ

## 2024-06-05 PROCEDURE — 99214 OFFICE O/P EST MOD 30 MIN: CPT | Mod: S$GLB,,, | Performed by: PSYCHIATRY & NEUROLOGY

## 2024-06-05 PROCEDURE — 99999 PR PBB SHADOW E&M-EST. PATIENT-LVL III: CPT | Mod: PBBFAC,,, | Performed by: PSYCHIATRY & NEUROLOGY

## 2024-06-05 RX ORDER — METRONIDAZOLE 500 MG/1
500 TABLET ORAL 3 TIMES DAILY
COMMUNITY
Start: 2024-05-29

## 2024-06-05 RX ORDER — PANTOPRAZOLE SODIUM 40 MG/1
40 TABLET, DELAYED RELEASE ORAL 2 TIMES DAILY
COMMUNITY
Start: 2024-05-29

## 2024-06-05 RX ORDER — BISMUTH SUBSALICYLATE 262 MG/1
262 TABLET ORAL 4 TIMES DAILY
COMMUNITY
Start: 2024-05-29

## 2024-06-05 NOTE — PROGRESS NOTES
Chief Complaint: Headache    Subjective:     History of Present Illness    Referring Provider: ED  Date of Injury: 6/13/23  Accompanied by: No one  Workers Comp     09/11/2023: Carlin Madrigal is a 68 y.o. male with h/o DM, HTN, HLD who presents for concussion evaluation. 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 Currently, headaches are right occipital, can last all day, 2-3 times a week, pounding. Per daughter, he has described head as bloated. He has a scar on right side of neck from the above injury. He has lower neck pain that is new since above injury. He has associated phonophobia per daughter, weakness in legs, imbalance that is new since above injury, spinning that was present prior to injury that is worse since above injury. He denies photophobia, numbness, tingling, lightheadedness, N/V. He has been having dry mouth. He notes fluctuating vision from diabetes and denies vision changes since above injury. He denies changes in taste, smell, hearing, other vision changes, appetite. He denies tinnitus. Per daughter, he gets bilateral jaw pain for the last 3 weeks that is sharp that is new since above injury and has looked swollen when eating before. He denies cognitive fogginess, concentration difficulties, and memory changes. He is sleeping well and wakes up rested. He has daytime fatigue. He snores and denies it worsening since above injury and denies apnea. He denies positional component but headache worsens when lays on right side and vasal vagal maneuvers do not significantly worsen headaches. He denies headaches waking him up and does  not wake up with headaches. Mood is okay and per daughter he is easily irritable since the above injury that he agrees with. He denies emotional lability. He has only tried Tylenol. He was just prescribed PT to help with headaches. He denies other prior head and neck injuries. Prior to current injury, headaches would be once every 2 weeks from being out in the sun and no associated photophobia, phonophobia, weakness, numbness, tingling, N/V, change in vision, aura and would not stop ADLs. Glucose most recently 124 and has been 160s and 180s.     Per ED note dated 6/13/23, per daughter he passed out at work today per coworkers and upon awakening could not remember where he was or what happened, he reports becoming dizzy but does not remember passing out     Per ED note dated 5/25/23, he has had 1 syncopal episode, has had intermittent blurred vision and dizziness described as lightheadedness that started 1 week ago, 1 week of right shoulder pain, and neck pain that began 2 weeks ago and denies trauma or injuries to the area and was sudden onset.     09/27/2023: Carlni Madrigal is a 68 y.o. male  with h/o DM, HTN, HLD, SAH and concussion with LOC, kinetic force injury with resultant cranio cervical trauma and occipital neuritis s/p medrol dose pack x1 who presents for follow up. On last visit, patient was prescribed a Medrol dose pack and started on ice therapy, ergonomics, and exercise restrictions and referred for vestibular PT and not to start neck PT and counseled on neuropathy precautions. In the interim, stopped Medrol as glucose went > 200. Offered patient translational services if needed and he declines at this time and let him know it is a free service should he choose to need it during the visit. He feels better compared to when injury first happened. Headaches are 1-3 times a week per a calendar he shows me, right occipital, cannot describe pain quality, lasts whole day but not into next day. He denies neck  pain. He describes generalized weakness. He denies photophobia, phonophobia, numbness, tingling, dizziness, N/V. He thinks he has difficulties seeing from diabetes and denies changes from his injury. He is sleeping well and wakes up feeling well. Mood is okay. He stopped taking Medrol on day 3 of the pack as his glucose was going > 200 and denies side effects. He is icing. He has not heard about vestibular PT. He is not doing neck PT. He is following neuropathy precautions.      11/08/2023: Carlin Madrigal is a 68 y.o. male with h/o DM, HTN, HLD, SAH and concussion with LOC, kinetic force injury with resultant cranio cervical trauma and occipital neuritis s/p medrol dose pack x1 who presents for follow up. On last visit, patient was referred for lower neck/upper back to mid back PT with myofascial release and continued on ice therapy, ergonomics, and exercise restrictions and referred for vestibular PT and counseled on neuropathy precautions. Offered patient translational services if needed and he declines at this time and let him know it is a free service should he choose to need it during the visit. He states that his head does not hurt but describes pins and needles sensation in right occipital region that can happen multiple times a day or not at all and feels it when turns head to the right and lasts 3-4 minutes. He denies neck pain. He denies photophobia, phonophobia, numbness, tingling, N/V, dizziness. He describes difficulties with far vision that he thinks may be from his diabetes. He describes generalized weakness and describes will get cramp in right posterior leg when tries to lift something. He is sleeping well and wakes up rested. Mood is good. He is doing neck PT and is helping and is doing vestibular PT that is helping but still feels imbalance.  He is following neuropathy precautions. He is not icing. He describes bilateral jaw pain and states jaw will get swollen when tries to chew. He states he has  upcoming appointment with dentist. He has new feeling that smells are staying in his nose.     01/11/2024: Carlin Madrigal is a 68 y.o. male with h/o DM, HTN, HLD, SAH and concussion with LOC, kinetic force injury with resultant cranio cervical trauma and occipital neuritis s/p medrol dose pack x1 who presents for follow up. On last visit, patient was continued on lower neck/upper back to mid back PT with myofascial release, ice therapy, ergonomics, and exercise restrictions and vestibular PT and counseled on neuropathy precautions. Offered patient translational services if needed and he declines at this time and let him know it is a free service should he choose to need it during the visit. He states he is doing well. He has slight pain in right base of skull, near daily for this week, 10 mins, triggered when turns head to the right, bothersome pain is best pain description, denies other headaches. He denies neck pain. He has right shoulder pain and sometimes has right hand weakness as a result. He states someone is trying to get him a shoulder specialist but he has not heard from one and is agreeable to a referral from me.  He denies photophobia, phonophobia, numbness, tingling, dizziness, N/V, change in vision. He is sleeping well and right lateral shoulder pain will wake him up and wakes up ready to go. Mood is good. He finished neck PT and does home exercises and PT helped. He is not needing to ice. He does vestibular PT exercises at home as he finished it and helped him. He is following neuropathy precautions. He denies side effects to gabapentin he is on and is taking 300 mg QAM and QPM only.     03/13/2024: Carlin Madrigal is a 69 y.o. male with h/o DM, HTN, HLD, SAH and concussion with LOC, kinetic force injury with resultant cranio cervical trauma and occipital neuritis s/p medrol dose pack x1 who presents for follow up. On last visit, patient was continued on neck exercises, ice therapy and to increase  physical activity as tolerated and continued vestibular PT exercises and counseled on neuropathy precautions and referred for ortho and increased gabapentin. Offered patient translational services if needed and he declines at this time and let him know it is a free service should he choose to need it during the visit. He indicates he is still having right upper cervical paraspinal pain that can radiate down right side of neck. He denies headaches. He states when he has neck pain he has to go lay down. He has generalized weakness. He still has right shoulder pain with numbness in right shoulder. He states he will sometimes have vision problems due to his diabetes. He denies photophobia, phonophobia, tingling, N/V, dizziness. He is sleeping well but his right shoulder will bother him and wakes up rested. Mood is good. He is doing neck exercises. He is doing vestibular PT exercises. He is following neuropathy precautions. He states increased gabapentin is helping and denies side effects. He has appointment for ortho and per daughter workers comp denied coverage for it. Per daughter, 1 month ago he was driving he could not turn his neck and did not know where he was and had to pull over and took 20 minutes before he could go back home. 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 Per he and daughter, head feels heavy and he indicates heaviness in occipital region. Per daughter, he has been complaining of jaw pain since he got hurt and workers comp has denied jaw evaluation. Per daughter, going back to work was a failure due to getting exhausted. Per daughter, he had to be started on a new blood pressure medication because blood pressure has been elevated.     04/24/2024: Carlin Madrigal is a 69 y.o. male with h/o DM, HTN, HLD, SAH and concussion with LOC, kinetic force injury  with resultant cranio cervical trauma and occipital neuritis s/p medrol dose pack x1 who presents for follow up. On last visit, patient was referred for lower neck/upper back to mid back PT with myofascial release and continued on ice therapy and to increase physical activity as tolerated and continued vestibular PT exercises and counseled on neuropathy precautions and referred for ortho and continued gabapentin. Offered patient translational services if needed and he declines at this time and let him know it is a free service should he choose to need it during the visit. He is doing better and neck PT is helping and is doing it right times a week. He has right sided neck pain that he feels more if turns head to the right too much. He has slight right occipital pinpoint headache, denies any pain characteristics, triggered by turning head to the right, resolves with turning neck back. He denies photophobia, phonophobia, weakness, numbness, tingling, dizziness, N/V, change in vision. He is sleeping well and wakes up rested. Mood is okay. He is not icing. He does light lifting in PT and PT is also working on right shoulder. He is doing vestibular exercises. He is following neuropathy precautions. He saw someone for his shoulder but unclear per description if that was thru his PCP or thru ortho. He is taking gabapentin and denies side effects.    06/05/2024: Carlin Madrigal is a 69 y.o. male with h/o DM, HTN, HLD, SAH and concussion with LOC, kinetic force injury with resultant cranio cervical trauma and occipital neuritis s/p medrol dose pack x1 who presents for follow up. On last visit, patient was continued on lower neck/upper back to mid back PT with myofascial release, ice therapy and to increase physical activity as tolerated and continued vestibular PT exercises and counseled on neuropathy precautions and referred for ortho and continued gabapentin and to do work hardening. Offered patient translational services if  "needed and he declines at this time and let him know it is a free service should he choose to need it during the visit. He states he still has limited ROM when turns head to the right and going to far causes pain and stretching in right side of neck and other ROM directions is better. Headaches are behind right ear, once a week, 10 mins, best described as a little pain. He has no neck pain unless he goes to far to the right with his head. He feels weak in upper back when tries to do physical activities like cleaning his yard. He denies photophobia, phonophobia, numbness, tingling, N/V, dizziness. He sometimes has difficulties with small text. He states his right shoulder is not getting better and got Xray and told may need MRI and has not seen ortho and does not know which doctor ordered the xrays other than it was the doctor who treats his diabetes. He is sleeping well and wakes up rested. He states he cannot sleep on his right side due to his right shoulder. Mood is okay. He is doing neck PT and is helping. He is not icing. He is doing vestibular exercises. He is following neuropathy precautions. He is taking gabapentin and denies side effects. He states PT has tried to mimic a work environment.    Current Outpatient Medications on File Prior to Visit   Medication Sig Dispense Refill    bismuth subsalicylate (BISMAREX) 262 mg Chew Take 262 mg by mouth 4 (four) times daily.      metroNIDAZOLE (FLAGYL) 500 MG tablet Take 500 mg by mouth 3 (three) times daily.      pantoprazole (PROTONIX) 40 MG tablet Take 40 mg by mouth 2 (two) times daily.      amLODIPine (NORVASC) 5 MG tablet Take 1 tablet (5 mg total) by mouth once daily. 30 tablet 1    BD ULTRA-FINE MINI PEN NEEDLE 31 gauge x 3/16" Ndle SMARTSI Each SUB-Q Twice Daily      diclofenac sodium (VOLTAREN) 1 % Gel Apply 2 g topically 3 (three) times daily. 100 g 0    ergocalciferol (ERGOCALCIFEROL) 50,000 unit Cap Vitamin D2 1,250 mcg (50,000 unit) capsule   TAKE " ONE CAPSULE BY MOUTH EVERY WEEK      gabapentin (NEURONTIN) 300 MG capsule Take 1 capsule (300 mg total) by mouth 3 (three) times daily. 90 capsule 5    levothyroxine (SYNTHROID) 50 MCG tablet Take 50 mcg by mouth every morning.      lisinopriL (PRINIVIL,ZESTRIL) 40 MG tablet Take 40 mg by mouth Daily.      metFORMIN (GLUCOPHAGE) 1000 MG tablet Take 1,000 mg by mouth 2 (two) times a day.      NOVOLIN 70/30 U-100 INSULIN 100 unit/mL (70-30) injection Inject 15 Units into the skin 2 (two) times a day.      pravastatin (PRAVACHOL) 20 MG tablet Take 20 mg by mouth Daily.       No current facility-administered medications on file prior to visit.       Review of patient's allergies indicates:  No Known Allergies    No family history on file.    Social History     Tobacco Use    Smoking status: Former     Types: Cigarettes     Passive exposure: Never    Smokeless tobacco: Never   Substance Use Topics    Alcohol use: Not Currently     Comment: 06/13/23: occ    Drug use: Not Currently       Review of Systems  Constitutional: No fevers, no chills, no change in weight  Eye/Vision: See HPI  Ear/Nose/Mouth/Throat: See HPI; no cough, no runny nose, no sore throat  Respiratory: No shortness of breath, no problems breathing  Cardiovascular: No chest pain  Gastrointestinal: See HPI, no diarrhea, no constipation  Genitourinary: No dysuria  Musculoskeletal: See HPI  Integumentary: No skin changes  Neurologic: See HPI  Psychiatric: Denies depression, denies anxiety, denies SI and HI.  Additional System Information:     Objective:     Vitals:    06/05/24 0937   BP: 117/69   Pulse: 75       General: Alert and awake, Well nourished, Well groomed, No acute distress, no photophobia with 60 Hz hypersensitivity.  Eyes: Extraocular movements are intact; Normal conjunctiva; no nystagmus; Visual fields are intact bilaterally to finger counting in all cardinal directions  Neck: Supple  No Stiffness  Patient has no occipital point tenderness over  "the bilateral greater and lesser occipital nerve without induction of headaches with no jump sign and no twitch response and no referred pain: 0+   No high, medial cervical pain with lateral movement of C1 over C2 and with isometric neck flexion and extension  Fluid patient turnaround with concurrent neck movement in direction of torso movement.  Right paraspinal cervical muscle spasm present  Spine/torso exam: Spine/ torso exam is within normal limits     Neurologic Exam  no saccadic intrusions of volitional ocular smooth pursuits  no pain with sustained upgaze and convergence  no visual motion sensitivity/dizziness produced with rapid eye movements or neck movements  no convergence insufficiency with no diplopia developed > 5 " accommodation    Sensory: Negative Romberg, no falls on tandem stance    Gait: Gait WNL, Heel to toe walking WNL    Labs:    No new labs    Imaging:    No new studies    Assessment:       ICD-10-CM ICD-9-CM    1. Concussion with loss of consciousness, sequela  S06.0X9S 907.0       2. Subarachnoid hemorrhage following injury, with loss of consciousness, sequela  S06.6X9S 907.0       3. Cervicalgia of gzcyzfdo-[id number]-crxqg region  M54.2 723.1       4. Cervicogenic headache  G44.86 784.0       5. Chronic right shoulder pain  M25.511 719.41     G89.29 338.29          69 y.o. male with h/o DM, HTN, HLD, SAH and concussion with LOC, kinetic force injury with resultant cranio cervical trauma and occipital neuritis s/p medrol dose pack x1 who presents for follow up. Imaging from 6/13/23 injury revealed bilateral frontal and left temporal and right parietoccipital and right cerebellar hemorrhagic contusions with small amount of thin subdural blood along the falx and tentorium with left frontal lobe SAH and small SAH in right posterior frontoparietal region. On exam, he has right cervical paraspinal spasm and on initial exam vibratory loss in fingers and distal lower extremities, positive right head " thrust. We discussed previously that he had a TBI given imaging evidence. We discussed previously based on imaging evidence, he may have problems with memory and executive functioning but he is not complaining of those functions at this time. We discussed previously exam also indicative of long fiber neuropathy that is likely 2/2 to DM and not related to his injury. Overall, his symptoms are improving and mainly muscular. We discussed previously he is describing paresthesias where he hit his head and that a crushed nerve injury may have occurred in the scalp that can take up to 2 years to fully heal or may be permanent and should the paresthesias bother him or last longer, we could do medication for it and will continue gabapentin. We discussed again how his right shoulder can be impacting his neck symptoms and exam. Will continue myofascial neck PT given improvement in symptoms.     Plan:     Continue lower neck/upper back to mid back PT with myofascial release (therapist may choose from and do a combination of: deep tissue massage, cupping, dry needling, etc). No soft tissue manipulation of suboccipital region if you start to feel worse. All joint manipulation is fine.  Please have therapy do work hardening with you  The patient was instructed to ice the occipital region for no more than 20 minutes at least once a day as needed but may repeat this as many times as they would like.   Can increase physical activity as tolerated  Continue vestibular PT exercises  You do not feel your feet well due to neuropathy. Please wear footwear with good tread at all times. This recommendation includes wearing socks with tread on them. Make sure all of your walkways, hallways, and julio are well lit at all times day and night. Use night lights to light up commonly used pathways in your home, meaning from the bedroom to the bathroom or kitchen. Make sure all of your walkways, hallways, and julio are clear of obstacles at  all times. Obstacles include decorations, rugs/mats, pet beds, shoes, and clothes. Make sure you have good hand holds to grab onto as you walk around your home.  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 Delays in recovery may cause or further worsen any underlying permanent injury that was caused by the result of the patient's injury. Delays in recovery may also cause the development of new medical conditions that will then need to be treated. The development of some forms of permanent injuries may result in nerve injuries that are refractory to initial treatment, which could result in further medical expenses as more expensive methods of treatment or prolonged treatment may be needed to treat the underlying injury. In my collective clinical experience, my care plan as documented from the initial note leads to significant recovery from injuries similar to the patient's in the majority of my other patients. Delays in recovery will prevent the patient from returning to work full time in a timely fashion. Delays in recovery caused by workers compensation for patients with similar injury have also lead to significant financial settlements for the patients should a legal dispute arise. I have counseled the patient on all of the above.  Please be advised that I do not determine MMI and will update in the assessment whether patient is improving or not, or  has plateaued to the point of permanent symptoms despite successfully following recommended treatment by myself  Please be advised that the above work status is re-evaluated each visit and that the nature of the patient's injury described in the assessment and diagnoses do not have a standard or expected timeline for recovery as demonstrated in multiple up to date peer-reviewed publications  Please be advised that I do not perform FCEs. I will also not answer or comment on any FCE questions.  Please be advised I will not fill out additional paperwork that asks me to restate diagnoses, plan of care, work status, and/or accommodations as these are all documented in my clinic note.  Please be advised I will not fill out paperwork asking me to agree with a workers compensation representative's interpretation of my plan of care and/or evaluation of the patient as I stand by what is documented in my clinic note.  Please be advised that any peer to peer requests made on the behalf of workers compensation will need to be scheduled based on my availability and will be completed by myself within 1 week of the request. My clinic is not capable of doing on demand peer to peer requests with less than 48 hours of notice.     ,Chandrakant Laughlin MD  [facility]

## 2024-06-05 NOTE — PROGRESS NOTES
[facility]   Physical Therapy Treatment Note      Name: Carlin Madrigal  Clinic Number: [id number]    Therapy Diagnosis:   Encounter Diagnosis   Name Primary?    Chronic right shoulder pain Yes       Physician: Chandrakant Laughlin MD    Visit Date: 6/5/2024    Physician Orders: PT Eval and Treat: Work Hardening/Conditioning   Medical Diagnosis from Referral:   S06.6X9S (ICD-10-CM) - Subarachnoid hemorrhage following injury, with loss of consciousness, sequela   S06.0X9S (ICD-10-CM) - Concussion with loss of consciousness, sequela   S13.4XXD (ICD-10-CM) - Whiplash injury to neck, subsequent encounter   Evaluation Date: 4/8/2024  Authorization Period Expiration: 10/2/2024  Plan of Care Expiration: 6/28/2024  Visit # / Visits authorized: 12/12, 10/12 (though pending UR, do not schedule f/u until auth rec'd)  PTA: 4/5    FOTO: 1/3     Time In: 8:00am  Time Out: 9:00am  Total Appointment Time: 60 minutes    Precautions: Standard and Diabetes     Reports out of work and has not returned since June of last year.   Company: [employer]   Job title:  - primarily just doing framing work    Subjective     Pt reports: that his neck continues to feel good with only a very slight pain when rotating to the right, but continues to have mod pain in right shoulder when moving right shoulder between 80* to 100* of flexion that has not changed in some time.  He was compliant with home exercise program.  Response to previous treatment: increased stiffness cervical spine post  Function: activities overhead are a little difficult due to shoulder pain    Pain: 1/10 right side of neck if turn to the right & 4/10 pain shoulder with overhead movement   Location:  right side of neck      Objective      Objective Measures updated at progress report unless specified.     Treatment     Carlin received the treatments listed below:      therapeutic exercises to develop strength, endurance, ROM,  "flexibility, posture, and core stabilization for 20 minutes including:    [x] UBE L2 for 5 min  [x] Standing chin tucks: x20 c/ 5" hold  [] Supine left cervical rotations: x20 c/ 5" hold  [x] Right Levator scap stretch: 3x30 sec  [x] SNAG right rotation: 10x10"   [] SNAG cervical extensions: 10x10"   [x] Physioball flexion roll out AAROM 15x5"  []Pulleys fwd/abd 3 min each    manual therapy techniques for 0 minutes, including:    [] R cervical rotational MWM 2x10, contract relax into R rotation x10 progressing ROM; cervical distraction with belt 30" x4  [] R C1/2 MET - side bend max to L, then rotate R to end ROM. Have the pt slightly rotate their head left into you hand, matching you resistance. Hold for 6 seconds, then  slack into R rotation and repeat.   [] Upper Cervical rotation into flexion - flex cervical spine to max flexion, turn head to R within pain free ROM. Have pt slightly rotation L into your hand, matching your resistance. Hold 6 seconds.  slack into R rotation and repeat.   [] R shoulder MWM into flexion x10    neuromuscular re-education activities to improve: Balance, Coordination, Kinesthetic, Sense, Proprioception, and Posture for 40 minutes. The following activities were included:    [] Circuit alternating activities:   Standing R shoulder Hz shoulder abduction/scaption GTB 2x12  Standing R shoulder pull down RTB/BTB 2x20  Physioball flexion roll out 1'    [x] Circuit alternating activities: 3x  Bent over rows, 20# kb: x10  Serratus punches 10#, x15  Seated ER to OH press starting from 90* shoulder scaption 2# 2x15 NP  Supine thoracic extension over foam roll x8 f/b along spine 3" hold NP  Standing serratus wall slides NP  Serratus push up plus in the plank postion: x6 NP    [x] Circuit alternating activities: 3x  Landmine press 10lb bar starting from treatment table 2x10 np  Standing wall lower trap lift offs: x10  Seated ER in 90* abd (elbow supported on plinth + 1 Airex pad, 2#: " x10     [x] Circuit alternating activities: 2x  B ER with yellow tb: x10  Right shoulder scaption: x10 np  Rows with blue tb: x10  Shoulder extensions with green tb: x10 np    []Thoracic rotation open<>close book 2x12   []Side lying ER, 2#: x15  []Supine AAROM with dowel into flexion: x20 with 5 sec hold  []Table slides into flexion: x20 with 5 sec hold    therapeutic activities to improve functional performance for 0 minutes, including:    [] Circuit alternating activities:   Strong Carry 25lb KB 3x1'  Strong carry 2lb 90/90 position 3x1'    Patient Education and Home Exercises       Education provided:   - Patient was educated on HEP and on all therapeutic interventions performed during today's treatment visit.     Home Exercises Provided: Yes & Patient instructed to cont prior HEP. Exercises were reviewed and Carlin was able to demonstrate them prior to the end of the session.  Carlin demonstrated good  understanding of the education provided. See EMR under Patient Instructions for exercises provided during therapy sessions    Assessment     Patient progress appears to be limited by anterior shoulder joint pain. Pt reports right shoulder pain levels have decreased but continue to have difficulty with reaching and overhead activities. Patient appears to be demonstrating minimal progress over the past few weeks as he continues to report anterior right shoulder pain between ~80* and 100* of flexion. It is also noted that patient endorses increased R cervical rotational mobility and increased right shoulder stability since start of care. He is only feeling slight cervical discomfort at end range with ipsilateral cervical rotation.     Patient prognosis is: Fair<>Good    Pt will continue to benefit from skilled Physical Therapy interventions in order to address the deficits listed in the problem list box on initial evaluation, provide education, and to address the musculoskeletal limitations and work-related  functional deficits for their job as a .    Pt's spiritual, cultural and educational needs considered and pt agreeable to plan of care and goals.     Anticipated barriers to physical therapy: chronicity of current injury     Goals:   LongTerm Goals: 4 weeks:      1.) Pt will become compliant and independent with the initial HEP to promote decreased pain and improved mobility and strength. MET  2.) Pt will become compliant and independent with an updated, progressed HEP to promote decreased pain and improved mobility and strength as well as prep for discharge from further PT intervention.   3.) Pt to report decrease in pain levels from 10/10 at worse to 3/10 at worse. MET  4.) Pt will improve R shoulder MMT scores by 2 muscle grade to improve functional stability and strength.   5.) Pt will demonstrate improved ability to reach over head with little to no c/o with the left shoulders in order to demonstrate improved functional shoulder mobility and self care.   6.) Pt will report 75%+ of his PLOF to demonstrate return to ADLs and community activities.   7.) Pt will demonstrate ability to lift 50lbs from floor to waist with good body mechanics within the clinic in order to simulate RTW goals.   8.) Pt will demonstrate ability to push and pull 100lbs x100 ft within the clinic in order to simulate RTW goals.    9.) Pt will demonstrate R cervical ROM WFL in order to improve ability to scan environment. MET     Pt will return to work full duty, full time.    Plan   Plan of care Certification: 4/8/2024 to 5/10/2024, 5/10/2024 to 6/28/2024.      Important to note, that the oatient may need an additional bout of PT by end of current POC based on presentation today.     Outpatient Physical Therapy 3 times weekly for 4 weeks to include the following interventions: Manual Therapy, Neuromuscular Re-ed, Patient Education, Self Care, Therapeutic Activities, and Therapeutic Exercise.      Preston Morales, PTA

## 2024-06-10 ENCOUNTER — CLINICAL SUPPORT (OUTPATIENT)
Dept: REHABILITATION | Facility: HOSPITAL | Age: 69
End: 2024-06-10
Payer: OTHER MISCELLANEOUS

## 2024-06-10 DIAGNOSIS — G89.29 CHRONIC RIGHT SHOULDER PAIN: Primary | ICD-10-CM

## 2024-06-10 DIAGNOSIS — M25.511 CHRONIC RIGHT SHOULDER PAIN: Primary | ICD-10-CM

## 2024-06-10 PROCEDURE — 97110 THERAPEUTIC EXERCISES: CPT

## 2024-06-10 PROCEDURE — 97140 MANUAL THERAPY 1/> REGIONS: CPT

## 2024-06-10 PROCEDURE — 97112 NEUROMUSCULAR REEDUCATION: CPT

## 2024-06-10 NOTE — PROGRESS NOTES
[facility]   Physical Therapy Treatment Note      Name: Carlin Madrigal  Clinic Number: [id number]    Therapy Diagnosis:   Encounter Diagnosis   Name Primary?    Chronic right shoulder pain Yes       Physician: Chandrakant Laughlin MD    Visit Date: 6/10/2024    Physician Orders: PT Eval and Treat: Work Hardening/Conditioning   Medical Diagnosis from Referral:   S06.6X9S (ICD-10-CM) - Subarachnoid hemorrhage following injury, with loss of consciousness, sequela   S06.0X9S (ICD-10-CM) - Concussion with loss of consciousness, sequela   S13.4XXD (ICD-10-CM) - Whiplash injury to neck, subsequent encounter   Evaluation Date: 4/8/2024  Authorization Period Expiration: 10/2/2024  Plan of Care Expiration: 6/28/2024  Visit # / Visits authorized: 12/12, 12/12 (though pending UR, do not schedule f/u until auth rec'd)  PTA: 0/5    FOTO: 1/3     Time In: 8:00am  Time Out: 9:00am  Total Appointment Time: 60 minutes    Precautions: Standard and Diabetes     Reports out of work and has not returned since June of last year.   Company: [employer]   Job title:  - primarily just doing framing work    Subjective     Pt reports: that his neck continues to feel good with only a very slight pain when rotating to the right at end range, otherwise much better overall. Chief c/o R shoulder pain when lying down on the right side. Otherwise, slow improvements with OH motion, but still limited and weakness. Reports he recently saw his referring provider and is to follow up with his PCP for his shoulder in which case he is going to drive to their clinic today or tomorrow to request a visit.  He was compliant with home exercise program.  Response to previous treatment: increased stiffness cervical spine post  Function: activities overhead are a little difficult due to shoulder pain    Pain: 1/10 right side of neck if turn to the right & 4/10 pain shoulder with overhead movement   Location:  right side  "of neck      Objective      Objective Measures updated at progress report unless specified. Last take on 5/23/2024.   R long head biceps    Treatment     Carlin received the treatments listed below:      therapeutic exercises to develop strength, endurance, ROM, flexibility, posture, and core stabilization for 10 minutes including:    [] UBE L2 for 5 min  [] Standing chin tucks: x20 c/ 5" hold  [] Supine left cervical rotations: x20 c/ 5" hold  [x] Right Levator scap stretch: 3x30 sec  [x] SNAG right rotation: 10x10"   [] SNAG cervical extensions: 10x10"   [x] Physioball flexion roll out AAROM 15x5"  []Pulleys fwd/abd 3 min each    manual therapy techniques for 10 minutes, including:    [] R cervical rotational MWM 2x10, contract relax into R rotation x10 progressing ROM; cervical distraction with belt 30" x4  [] R C1/2 MET - side bend max to L, then rotate R to end ROM. Have the pt slightly rotate their head left into you hand, matching you resistance. Hold for 6 seconds, then  slack into R rotation and repeat.   [] Upper Cervical rotation into flexion - flex cervical spine to max flexion, turn head to R within pain free ROM. Have pt slightly rotation L into your hand, matching your resistance. Hold 6 seconds.  slack into R rotation and repeat.   [x] R shoulder MWM into flexion x10    neuromuscular re-education activities to improve: Balance, Coordination, Kinesthetic, Sense, Proprioception, and Posture for 40 minutes. The following activities were included:    [x] Circuit alternating activities:   Standing R shoulder Hz shoulder abduction/scaption GTB 2x12  Standing serratus wall slides   Seated ER to OH press starting from 90* shoulder scaption 2# 2x15     [] Circuit alternating activities: 3x  Serratus punches 10#, x15  Supine thoracic extension over foam roll x8 f/b along spine 3" hold     [x] Circuit alternating activities: 3x  Landmine press 10lb bar starting from treatment table 2x10   Bent " over rows, 20# kb: x10  Prone level 1 lower trap lift offs: x10    [x] Circuit alternating activities: 2x  B ER with yellow tb: x10  Right shoulder scaption: x10 np  Rows with blue tb: x10    [] Thoracic rotation open<>close book 2x12   [] Side lying ER, 2#: x15  [] Supine AAROM with dowel into flexion: x20 with 5 sec hold  [] Table slides into flexion: x20 with 5 sec hold  [] Serratus push up plus in the plank postion: x6    therapeutic activities to improve functional performance for 0 minutes, including:    [] Circuit alternating activities:   Strong Carry 25lb KB 3x1'  Strong carry 2lb 90/90 position 3x1'    Patient Education and Home Exercises       Education provided:   - Patient was educated on HEP and on all therapeutic interventions performed during today's treatment visit.     Home Exercises Provided: Yes & Patient instructed to cont prior HEP. Exercises were reviewed and Carlin was able to demonstrate them prior to the end of the session.  Carlin demonstrated good  understanding of the education provided. See EMR under Patient Instructions for exercises provided during therapy sessions    Assessment     Patient overall has been seen for a total of 24 treatment sessions. Long discussion today regarding remaining, ongoing s/s. Pt cervical ROM and s/s dramatically improved, though he continues to have minimal s/s end range. This is controlled post manual care; however, carry over is poor. Pt may benefit from additional care such as pain management to address remaining deficits. Pt also with ongoing shoulder discomfort and screening suggests potential RTC pathology in which case pt suggested that his referring provider voiced he should contact his PCP regarding and potentially seek ortho referral. Pt reports right shoulder pain levels have decreased but continue to have difficulty with reaching and overhead activities. Patient appears to be demonstrating minimal progress over the past few weeks as he  continues to report anterior right shoulder pain between ~80* and 100* of flexion; potential mild step deformity present. Pt provided with updated HEP and discussed outlook of his case moving forward. Feel as though he needs additional care as mentioned above.     Patient prognosis is: Fair<>Good    Pt's spiritual, cultural and educational needs considered and pt agreeable to plan of care and goals.     Anticipated barriers to physical therapy: chronicity of current injury     Goals:   LongTerm Goals: 4 weeks:      1.) Pt will become compliant and independent with the initial HEP to promote decreased pain and improved mobility and strength. MET  2.) Pt will become compliant and independent with an updated, progressed HEP to promote decreased pain and improved mobility and strength as well as prep for discharge from further PT intervention.   3.) Pt to report decrease in pain levels from 10/10 at worse to 3/10 at worse. MET  4.) Pt will improve R shoulder MMT scores by 2 muscle grade to improve functional stability and strength.   5.) Pt will demonstrate improved ability to reach over head with little to no c/o with the left shoulders in order to demonstrate improved functional shoulder mobility and self care. Improved, but not pain free  6.) Pt will report 75%+ of his PLOF to demonstrate return to ADLs and community activities. Partially MET 80% PLOF cervical  7.) Pt will demonstrate ability to lift 50lbs from floor to waist with good body mechanics within the clinic in order to simulate RTW goals. NOT MET, 25 lbs  8.) Pt will demonstrate ability to push and pull 100lbs x100 ft within the clinic in order to simulate RTW goals. MET  9.) Pt will demonstrate R cervical ROM WFL in order to improve ability to scan environment. MET     Pt will return to work full duty, full time. NOT MET    Plan   Pt may benefit from further work up for ongoing end range cervical spine s/s (possibly pain management) and ongoing shoulder  discomfort (possibly ortho).      Yaron Celis, PT

## 2024-06-12 ENCOUNTER — CLINICAL SUPPORT (OUTPATIENT)
Dept: REHABILITATION | Facility: HOSPITAL | Age: 69
End: 2024-06-12
Payer: OTHER MISCELLANEOUS

## 2024-06-12 DIAGNOSIS — G89.29 CHRONIC RIGHT SHOULDER PAIN: Primary | ICD-10-CM

## 2024-06-12 DIAGNOSIS — M25.511 CHRONIC RIGHT SHOULDER PAIN: Primary | ICD-10-CM

## 2024-06-12 PROCEDURE — 97110 THERAPEUTIC EXERCISES: CPT | Mod: CQ

## 2024-06-12 PROCEDURE — 97112 NEUROMUSCULAR REEDUCATION: CPT | Mod: CQ

## 2024-06-12 NOTE — PROGRESS NOTES
[facility]   Physical Therapy Treatment Note      Name: Carlin Madrigal  Clinic Number: [id number]    Therapy Diagnosis:   Encounter Diagnosis   Name Primary?    Chronic right shoulder pain Yes       Physician: Chandrakant Laughlin MD    Visit Date: 6/12/2024    Physician Orders: PT Eval and Treat: Work Hardening/Conditioning   Medical Diagnosis from Referral:   S06.6X9S (ICD-10-CM) - Subarachnoid hemorrhage following injury, with loss of consciousness, sequela   S06.0X9S (ICD-10-CM) - Concussion with loss of consciousness, sequela   S13.4XXD (ICD-10-CM) - Whiplash injury to neck, subsequent encounter   Evaluation Date: 4/8/2024  Authorization Period Expiration: 10/2/2024  Plan of Care Expiration: 6/28/2024  Visit # / Visits authorized: 12/12, 12/12 (though pending UR, do not schedule f/u until auth rec'd)  PTA: 1/5    FOTO: 1/3     Time In: 8:00am  Time Out: 9:00am  Total Appointment Time: 60 minutes    Precautions: Standard and Diabetes     Reports out of work and has not returned since June of last year.   Company: [employer]   Job title:  - primarily just doing framing work    Subjective     Pt reports: that his neck continues to feel good with only a very slight pain when rotating to the right at end range, otherwise much better overall. Chief c/o R shoulder pain when lying down on the right side. Otherwise, slow improvements with OH motion, but still limited and weakness.     Reports he is to follow up with his PCP for his shoulder in which case he is going to drive to their clinic today or tomorrow to request a visit. Long discussion - today is his last remaining visit.   He was compliant with home exercise program.  Response to previous treatment: increased stiffness cervical spine post  Function: activities overhead are a little difficult due to shoulder pain    Pain: 1/10 right side of neck if turn to the right & 1/10 pain shoulder with overhead movement  "  Location:  right side of neck & right anterior shoulder     Objective      Objective Measures updated at progress report unless specified. Last take on 5/23/2024.   R long head biceps    Treatment     Carlin received the treatments listed below:      therapeutic exercises to develop strength, endurance, ROM, flexibility, posture, and core stabilization for 25 minutes including:    [x] UBE L2 for 8 min  [] Standing chin tucks: x20 c/ 5" hold  [] Supine left cervical rotations: x20 c/ 5" hold  [x] Right Levator scap stretch: 3x30 sec  [x] SNAG right rotation: 10x10"   [] SNAG cervical extensions: 10x10"   [x] Physioball flexion roll out AAROM 15x5"  []Pulleys fwd/abd 3 min each    manual therapy techniques for 0 minutes, including:    [] R cervical rotational MWM 2x10, contract relax into R rotation x10 progressing ROM; cervical distraction with belt 30" x4  [] R C1/2 MET - side bend max to L, then rotate R to end ROM. Have the pt slightly rotate their head left into you hand, matching you resistance. Hold for 6 seconds, then  slack into R rotation and repeat.   [] Upper Cervical rotation into flexion - flex cervical spine to max flexion, turn head to R within pain free ROM. Have pt slightly rotation L into your hand, matching your resistance. Hold 6 seconds.  slack into R rotation and repeat.   [] R shoulder MWM into flexion x10    neuromuscular re-education activities to improve: Balance, Coordination, Kinesthetic, Sense, Proprioception, and Posture for 35 minutes. The following activities were included:    [x] Circuit alternating activities: 2x  Standing R shoulder Hz shoulder abduction/scaption GTB: x12  Standing serratus wall slides: x10   Seated ER to OH press starting from 90* shoulder scaption 2#: x15     [] Circuit alternating activities: 3x  Serratus punches 10#, x15  Supine thoracic extension over foam roll x8 f/b along spine 3" hold     [x] Circuit alternating activities: 2x  Landmine " press 10lb bar starting from treatment table 2x10   Bent over rows, 20# kb: x10  Prone level 1 lower trap lift offs: x10    [] Circuit alternating activities: 2x  B ER with yellow tb: x10  Right shoulder scaption: x10 np  Rows with blue tb: x10    [] Thoracic rotation open<>close book 2x12   [] Side lying ER, 2#: x15  [] Supine AAROM with dowel into flexion: x20 with 5 sec hold  [] Table slides into flexion: x20 with 5 sec hold  [] Serratus push up plus in the plank postion: x6    therapeutic activities to improve functional performance for 0 minutes, including:    [] Circuit alternating activities:   Strong Carry 25lb KB 3x1'  Strong carry 2lb 90/90 position 3x1'    Patient Education and Home Exercises       Education provided:   - Patient was educated on HEP and on all therapeutic interventions performed during today's treatment visit.     Home Exercises Provided: Yes & Patient instructed to cont prior HEP. Exercises were reviewed and Carlin was able to demonstrate them prior to the end of the session.  Carlin demonstrated good  understanding of the education provided. See EMR under Patient Instructions for exercises provided during therapy sessions    Assessment     Patient progress appears to be limited by anterior shoulder joint pain. Pt reports right shoulder pain levels have decreased but continue to have difficulty with reaching and overhead activities. Patient appears to be demonstrating minimal progress over the past few weeks as he continues to report anterior right shoulder pain between ~80* and 100* of flexion. It is also noted that patient endorses increased R cervical rotational mobility and increased right shoulder stability since start of care. He is only feeling slight cervical discomfort at end range with ipsilateral cervical rotation.     Patient prognosis is: Fair<>Good    Pt will continue to benefit from skilled Physical Therapy interventions in order to address the deficits listed in the  problem list box on initial evaluation, provide education, and to address the musculoskeletal limitations and work-related functional deficits for their job as a .    Pt's spiritual, cultural and educational needs considered and pt agreeable to plan of care and goals.     Anticipated barriers to physical therapy: chronicity of current injury     Goals:   LongTerm Goals: 4 weeks:      1.) Pt will become compliant and independent with the initial HEP to promote decreased pain and improved mobility and strength. MET  2.) Pt will become compliant and independent with an updated, progressed HEP to promote decreased pain and improved mobility and strength as well as prep for discharge from further PT intervention.   3.) Pt to report decrease in pain levels from 10/10 at worse to 3/10 at worse. MET  4.) Pt will improve R shoulder MMT scores by 2 muscle grade to improve functional stability and strength.   5.) Pt will demonstrate improved ability to reach over head with little to no c/o with the left shoulders in order to demonstrate improved functional shoulder mobility and self care.   6.) Pt will report 75%+ of his PLOF to demonstrate return to ADLs and community activities.   7.) Pt will demonstrate ability to lift 50lbs from floor to waist with good body mechanics within the clinic in order to simulate RTW goals.   8.) Pt will demonstrate ability to push and pull 100lbs x100 ft within the clinic in order to simulate RTW goals.    9.) Pt will demonstrate R cervical ROM WFL in order to improve ability to scan environment. MET     Pt will return to work full duty, full time.    Plan   Plan of care Certification: 4/8/2024 to 5/10/2024, 5/10/2024 to 6/28/2024.      Important to note, that the oatient may need an additional bout of PT by end of current POC based on presentation today.     Outpatient Physical Therapy 3 times weekly for 4 weeks to include the following interventions: Manual Therapy,  Neuromuscular Re-ed, Patient Education, Self Care, Therapeutic Activities, and Therapeutic Exercise.      Preston Morales, PTA

## 2024-07-29 PROBLEM — S06.6X9A SUBARACHNOID HEMORRHAGE FOLLOWING INJURY, WITH LOSS OF CONSCIOUSNESS: Status: RESOLVED | Noted: 2023-09-11 | Resolved: 2024-07-29

## 2024-11-22 ENCOUNTER — OFFICE VISIT (OUTPATIENT)
Dept: NEUROLOGY | Facility: CLINIC | Age: 69
End: 2024-11-22
Payer: OTHER MISCELLANEOUS

## 2024-11-22 VITALS — SYSTOLIC BLOOD PRESSURE: 142 MMHG | HEART RATE: 80 BPM | DIASTOLIC BLOOD PRESSURE: 81 MMHG

## 2024-11-22 DIAGNOSIS — G44.86 CERVICOGENIC HEADACHE: ICD-10-CM

## 2024-11-22 DIAGNOSIS — S06.6X9S SUBARACHNOID HEMORRHAGE FOLLOWING INJURY, WITH LOSS OF CONSCIOUSNESS, SEQUELA: ICD-10-CM

## 2024-11-22 DIAGNOSIS — S06.0X9S CONCUSSION WITH LOSS OF CONSCIOUSNESS, SEQUELA: Primary | ICD-10-CM

## 2024-11-22 DIAGNOSIS — M54.81 OCCIPITAL NEURITIS: ICD-10-CM

## 2024-11-22 DIAGNOSIS — M54.2 CERVICALGIA: ICD-10-CM

## 2024-11-22 DIAGNOSIS — M25.511 CHRONIC RIGHT SHOULDER PAIN: ICD-10-CM

## 2024-11-22 DIAGNOSIS — G89.29 CHRONIC RIGHT SHOULDER PAIN: ICD-10-CM

## 2024-11-22 DIAGNOSIS — M54.2 CERVICALGIA OF OCCIPITO-ATLANTO-AXIAL REGION: ICD-10-CM

## 2024-11-22 DIAGNOSIS — R53.83 FATIGUE DUE TO SLEEP PATTERN DISTURBANCE: ICD-10-CM

## 2024-11-22 DIAGNOSIS — G47.9 FATIGUE DUE TO SLEEP PATTERN DISTURBANCE: ICD-10-CM

## 2024-11-22 PROCEDURE — 99999 PR PBB SHADOW E&M-EST. PATIENT-LVL III: CPT | Mod: PBBFAC,,, | Performed by: PSYCHIATRY & NEUROLOGY

## 2024-11-22 RX ORDER — GABAPENTIN 300 MG/1
300 CAPSULE ORAL 3 TIMES DAILY
Qty: 90 CAPSULE | Refills: 11 | Status: SHIPPED | OUTPATIENT
Start: 2024-11-22 | End: 2025-11-22

## 2024-11-22 NOTE — PATIENT INSTRUCTIONS
The patient was instructed to ice the occipital region for no more than 20 minutes at least once a day as needed but may repeat this as many times as they would like.   Can increase physical activity as tolerated  Continue vestibular PT exercises  You do not feel your feet well due to neuropathy. Please wear footwear with good tread at all times. This recommendation includes wearing socks with tread on them. Make sure all of your walkways, hallways, and julio are well lit at all times day and night. Use night lights to light up commonly used pathways in your home, meaning from the bedroom to the bathroom or kitchen. Make sure all of your walkways, hallways, and julio are clear of obstacles at all times. Obstacles include decorations, rugs/mats, pet beds, shoes, and clothes. Make sure you have good hand holds to grab onto as you walk around your home.  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 Delays in recovery may cause or further worsen any underlying permanent injury that was caused by the result of the patient's injury. Delays in recovery may also cause the development of new medical conditions that will then need to be treated. The development of some forms of permanent injuries may result in nerve injuries that are refractory to initial treatment, which could result in further medical expenses as more  expensive methods of treatment or prolonged treatment may be needed to treat the underlying injury. In my collective clinical experience, my care plan as documented from the initial note leads to significant recovery from injuries similar to the patient's in the majority of my other patients. Delays in recovery will prevent the patient from returning to work full time in a timely fashion. Delays in recovery caused by workers compensation for patients with similar injury have also lead to significant financial settlements for the patients should a legal dispute arise. I have counseled the patient on all of the above.  Please be advised that I do not determine MMI and will update in the assessment whether patient is improving or not, or has plateaued to the point of permanent symptoms despite successfully following recommended treatment by myself  Please be advised that the above work status is re-evaluated each visit and that the nature of the patient's injury described in the assessment and diagnoses do not have a standard or expected timeline for recovery as demonstrated in multiple up to date peer-reviewed publications  Please be advised that I do not perform FCEs. I will also not answer or comment on any FCE questions.  Please be advised I will not fill out additional paperwork that asks me to restate diagnoses, plan of care, work status, and/or accommodations as these are all documented in my clinic note.  Please be advised I will not fill out paperwork asking me to agree with a workers compensation representative's interpretation of my plan of care and/or evaluation of the patient as I stand by what is documented in my clinic note.  Please be advised that any peer to peer requests made on the behalf of workers compensation will need to be scheduled based on my availability and will be completed by myself within 1 week of the request. My clinic is not capable of doing on demand peer to peer requests with less  than 48 hours of notice.

## 2024-11-22 NOTE — PROGRESS NOTES
Chief Complaint: Headache    Subjective:     History of Present Illness    Referring Provider: ED  Date of Injury: 6/13/23  Accompanied by: Daughter  Workers Comp     09/11/2023: Carlin Madrigal is a 68 y.o. male with h/o DM, HTN, HLD who presents for concussion evaluation. 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 Currently, headaches are right occipital, can last all day, 2-3 times a week, pounding. Per daughter, he has described head as bloated. He has a scar on right side of neck from the above injury. He has lower neck pain that is new since above injury. He has associated phonophobia per daughter, weakness in legs, imbalance that is new since above injury, spinning that was present prior to injury that is worse since above injury. He denies photophobia, numbness, tingling, lightheadedness, N/V. He has been having dry mouth. He notes fluctuating vision from diabetes and denies vision changes since above injury. He denies changes in taste, smell, hearing, other vision changes, appetite. He denies tinnitus. Per daughter, he gets bilateral jaw pain for the last 3 weeks that is sharp that is new since above injury and has looked swollen when eating before. He denies cognitive fogginess, concentration difficulties, and memory changes. He is sleeping well and wakes up rested. He has daytime fatigue. He snores and denies it worsening since above injury and denies apnea. He denies positional component but headache worsens when lays on right side and vasal vagal maneuvers do not significantly worsen headaches. He denies headaches waking him up and  does not wake up with headaches. Mood is okay and per daughter he is easily irritable since the above injury that he agrees with. He denies emotional lability. He has only tried Tylenol. He was just prescribed PT to help with headaches. He denies other prior head and neck injuries. Prior to current injury, headaches would be once every 2 weeks from being out in the sun and no associated photophobia, phonophobia, weakness, numbness, tingling, N/V, change in vision, aura and would not stop ADLs. Glucose most recently 124 and has been 160s and 180s.     Per ED note dated 6/13/23, per daughter he passed out at work today per coworkers and upon awakening could not remember where he was or what happened, he reports becoming dizzy but does not remember passing out     Per ED note dated 5/25/23, he has had 1 syncopal episode, has had intermittent blurred vision and dizziness described as lightheadedness that started 1 week ago, 1 week of right shoulder pain, and neck pain that began 2 weeks ago and denies trauma or injuries to the area and was sudden onset.     09/27/2023: Carlin Madrigal is a 68 y.o. male  with h/o DM, HTN, HLD, SAH and concussion with LOC, kinetic force injury with resultant cranio cervical trauma and occipital neuritis s/p medrol dose pack x1 who presents for follow up. On last visit, patient was prescribed a Medrol dose pack and started on ice therapy, ergonomics, and exercise restrictions and referred for vestibular PT and not to start neck PT and counseled on neuropathy precautions. In the interim, stopped Medrol as glucose went > 200. Offered patient translational services if needed and he declines at this time and let him know it is a free service should he choose to need it during the visit. He feels better compared to when injury first happened. Headaches are 1-3 times a week per a calendar he shows me, right occipital, cannot describe pain quality, lasts whole day but not into next day. He denies  neck pain. He describes generalized weakness. He denies photophobia, phonophobia, numbness, tingling, dizziness, N/V. He thinks he has difficulties seeing from diabetes and denies changes from his injury. He is sleeping well and wakes up feeling well. Mood is okay. He stopped taking Medrol on day 3 of the pack as his glucose was going > 200 and denies side effects. He is icing. He has not heard about vestibular PT. He is not doing neck PT. He is following neuropathy precautions.      11/08/2023: Carlin Madrigal is a 68 y.o. male with h/o DM, HTN, HLD, SAH and concussion with LOC, kinetic force injury with resultant cranio cervical trauma and occipital neuritis s/p medrol dose pack x1 who presents for follow up. On last visit, patient was referred for lower neck/upper back to mid back PT with myofascial release and continued on ice therapy, ergonomics, and exercise restrictions and referred for vestibular PT and counseled on neuropathy precautions. Offered patient translational services if needed and he declines at this time and let him know it is a free service should he choose to need it during the visit. He states that his head does not hurt but describes pins and needles sensation in right occipital region that can happen multiple times a day or not at all and feels it when turns head to the right and lasts 3-4 minutes. He denies neck pain. He denies photophobia, phonophobia, numbness, tingling, N/V, dizziness. He describes difficulties with far vision that he thinks may be from his diabetes. He describes generalized weakness and describes will get cramp in right posterior leg when tries to lift something. He is sleeping well and wakes up rested. Mood is good. He is doing neck PT and is helping and is doing vestibular PT that is helping but still feels imbalance.  He is following neuropathy precautions. He is not icing. He describes bilateral jaw pain and states jaw will get swollen when tries to chew. He states he  has upcoming appointment with dentist. He has new feeling that smells are staying in his nose.     01/11/2024: Carlin Madrigal is a 68 y.o. male with h/o DM, HTN, HLD, SAH and concussion with LOC, kinetic force injury with resultant cranio cervical trauma and occipital neuritis s/p medrol dose pack x1 who presents for follow up. On last visit, patient was continued on lower neck/upper back to mid back PT with myofascial release, ice therapy, ergonomics, and exercise restrictions and vestibular PT and counseled on neuropathy precautions. Offered patient translational services if needed and he declines at this time and let him know it is a free service should he choose to need it during the visit. He states he is doing well. He has slight pain in right base of skull, near daily for this week, 10 mins, triggered when turns head to the right, bothersome pain is best pain description, denies other headaches. He denies neck pain. He has right shoulder pain and sometimes has right hand weakness as a result. He states someone is trying to get him a shoulder specialist but he has not heard from one and is agreeable to a referral from me.  He denies photophobia, phonophobia, numbness, tingling, dizziness, N/V, change in vision. He is sleeping well and right lateral shoulder pain will wake him up and wakes up ready to go. Mood is good. He finished neck PT and does home exercises and PT helped. He is not needing to ice. He does vestibular PT exercises at home as he finished it and helped him. He is following neuropathy precautions. He denies side effects to gabapentin he is on and is taking 300 mg QAM and QPM only.     03/13/2024: Carlin Madrigal is a 69 y.o. male with h/o DM, HTN, HLD, SAH and concussion with LOC, kinetic force injury with resultant cranio cervical trauma and occipital neuritis s/p medrol dose pack x1 who presents for follow up. On last visit, patient was continued on neck exercises, ice therapy and to increase  physical activity as tolerated and continued vestibular PT exercises and counseled on neuropathy precautions and referred for ortho and increased gabapentin. Offered patient translational services if needed and he declines at this time and let him know it is a free service should he choose to need it during the visit. He indicates he is still having right upper cervical paraspinal pain that can radiate down right side of neck. He denies headaches. He states when he has neck pain he has to go lay down. He has generalized weakness. He still has right shoulder pain with numbness in right shoulder. He states he will sometimes have vision problems due to his diabetes. He denies photophobia, phonophobia, tingling, N/V, dizziness. He is sleeping well but his right shoulder will bother him and wakes up rested. Mood is good. He is doing neck exercises. He is doing vestibular PT exercises. He is following neuropathy precautions. He states increased gabapentin is helping and denies side effects. He has appointment for ortho and per daughter workers comp denied coverage for it. Per daughter, 1 month ago he was driving he could not turn his neck and did not know where he was and had to pull over and took 20 minutes before he could go back home. 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 Per he and daughter, head feels heavy and he indicates heaviness in occipital region. Per daughter, he has been complaining of jaw pain since he got hurt and workers comp has denied jaw evaluation. Per daughter, going back to work was a failure due to getting exhausted. Per daughter, he had to be started on a new blood pressure medication because blood pressure has been elevated.     04/24/2024: Carlin Madrigal is a 69 y.o. male with h/o DM, HTN, HLD, SAH and concussion with LOC, kinetic force injury  with resultant cranio cervical trauma and occipital neuritis s/p medrol dose pack x1 who presents for follow up. On last visit, patient was referred for lower neck/upper back to mid back PT with myofascial release and continued on ice therapy and to increase physical activity as tolerated and continued vestibular PT exercises and counseled on neuropathy precautions and referred for ortho and continued gabapentin. Offered patient translational services if needed and he declines at this time and let him know it is a free service should he choose to need it during the visit. He is doing better and neck PT is helping and is doing it right times a week. He has right sided neck pain that he feels more if turns head to the right too much. He has slight right occipital pinpoint headache, denies any pain characteristics, triggered by turning head to the right, resolves with turning neck back. He denies photophobia, phonophobia, weakness, numbness, tingling, dizziness, N/V, change in vision. He is sleeping well and wakes up rested. Mood is okay. He is not icing. He does light lifting in PT and PT is also working on right shoulder. He is doing vestibular exercises. He is following neuropathy precautions. He saw someone for his shoulder but unclear per description if that was thru his PCP or thru ortho. He is taking gabapentin and denies side effects.     06/05/2024: Carlin Madrigal is a 69 y.o. male with h/o DM, HTN, HLD, SAH and concussion with LOC, kinetic force injury with resultant cranio cervical trauma and occipital neuritis s/p medrol dose pack x1 who presents for follow up. On last visit, patient was continued on lower neck/upper back to mid back PT with myofascial release, ice therapy and to increase physical activity as tolerated and continued vestibular PT exercises and counseled on neuropathy precautions and referred for ortho and continued gabapentin and to do work hardening. Offered patient translational services  if needed and he declines at this time and let him know it is a free service should he choose to need it during the visit. He states he still has limited ROM when turns head to the right and going to far causes pain and stretching in right side of neck and other ROM directions is better. Headaches are behind right ear, once a week, 10 mins, best described as a little pain. He has no neck pain unless he goes to far to the right with his head. He feels weak in upper back when tries to do physical activities like cleaning his yard. He denies photophobia, phonophobia, numbness, tingling, N/V, dizziness. He sometimes has difficulties with small text. He states his right shoulder is not getting better and got Xray and told may need MRI and has not seen ortho and does not know which doctor ordered the xrays other than it was the doctor who treats his diabetes. He is sleeping well and wakes up rested. He states he cannot sleep on his right side due to his right shoulder. Mood is okay. He is doing neck PT and is helping. He is not icing. He is doing vestibular exercises. He is following neuropathy precautions. He is taking gabapentin and denies side effects. He states PT has tried to mimic a work environment.    11/22/2024: Carlin Madrigal is a 69 y.o. male with h/o DM, HTN, HLD, SAH and concussion with LOC, kinetic force injury with resultant cranio cervical trauma and occipital neuritis s/p medrol dose pack x1 who presents for follow up. On last visit, patient was continued on lower neck/upper back to mid back PT with myofascial release, ice therapy and to increase physical activity as tolerated and continued vestibular PT exercises and counseled on neuropathy precautions and referred for ortho and continued gabapentin and to do work hardening. Offered patient translational services if needed and he declines at this time and let him know it is a free service should he choose to need it during the visit. He states he is not  doing well as still has left base of skull pain and left cervical paraspinal pain. Headaches are daily, 15-20 mins, right occipital, 5-6 out of 10 pain, sharp. He associated BUE weakness and cannot  anything heavy. He has difficulties with near vision that he thinks may be from diabetes. He denies photophobia, phonophobia, numbness, tingling, N/V, dizziness. He has right shoulder pain and right knee pain and indicates right knee was swollen. He is sleeping well and wakes up 2-3 times to use the restroom and sometimes wakes up tired. He gets fatigued quickly. Mood is okay and per daughter, his mood has progressively worsened and easily gets irritable and has mood swings. He stopped neck PT and per daughter, he stopped going as pain got worse when doing PT. He states he never got dry needling. He is not icing.  He is doing vestibular exercises. He is following neuropathy precautions. Per daughter, he had a second opinion from workers comp. Per daughter, ortho was denied by workers comp so went thru his PCP who ordered xray of shoulder. Per daughter, she would like to get MRI of C-Spine. Per daughter, PCP has referred him for oral maxillofacial for right jaw pain as workers comp was not helping with it. Per daughter, he injured his right knee trying to lift a refrigerator with his son and due to his arm weakness he dropped the refrigerator on his knee. He states he is not taking gabapentin and per he and daughter, no side effects and per daughter, he ran out of it. He notes his head/neck pain was less frequent on the gabapentin. He states PT did not do work hardening.     Today, I personally reviewed the PT notes that were completed after any previous visit to the [facility] as documented in the patient's electronic medical record. This review was done to analyze the patient's progress and findings as it relates to the conditions that the [facility] is treating.    Current Outpatient  "Medications on File Prior to Visit   Medication Sig Dispense Refill    BD ULTRA-FINE MINI PEN NEEDLE 31 gauge x 3/16" Ndle SMARTSI Each SUB-Q Twice Daily      bismuth subsalicylate (BISMAREX) 262 mg Chew Take 262 mg by mouth 4 (four) times daily.      diclofenac sodium (VOLTAREN) 1 % Gel Apply 2 g topically 3 (three) times daily. 100 g 0    ergocalciferol (ERGOCALCIFEROL) 50,000 unit Cap Vitamin D2 1,250 mcg (50,000 unit) capsule   TAKE ONE CAPSULE BY MOUTH EVERY WEEK      levothyroxine (SYNTHROID) 50 MCG tablet Take 50 mcg by mouth every morning.      lisinopriL (PRINIVIL,ZESTRIL) 40 MG tablet Take 40 mg by mouth Daily.      metFORMIN (GLUCOPHAGE) 1000 MG tablet Take 1,000 mg by mouth 2 (two) times a day.      metroNIDAZOLE (FLAGYL) 500 MG tablet Take 500 mg by mouth 3 (three) times daily.      NOVOLIN 70/30 U-100 INSULIN 100 unit/mL (70-30) injection Inject 15 Units into the skin 2 (two) times a day.      pantoprazole (PROTONIX) 40 MG tablet Take 40 mg by mouth 2 (two) times daily.      pravastatin (PRAVACHOL) 20 MG tablet Take 20 mg by mouth Daily.      amLODIPine (NORVASC) 5 MG tablet Take 1 tablet (5 mg total) by mouth once daily. 30 tablet 1    gabapentin (NEURONTIN) 300 MG capsule Take 1 capsule (300 mg total) by mouth 3 (three) times daily. (Patient not taking: Reported on 2024) 90 capsule 5     No current facility-administered medications on file prior to visit.       Review of patient's allergies indicates:  No Known Allergies    No family history on file.    Social History     Tobacco Use    Smoking status: Former     Types: Cigarettes     Passive exposure: Never    Smokeless tobacco: Never   Substance Use Topics    Alcohol use: Not Currently     Comment: 23: occ    Drug use: Not Currently       Review of Systems  Constitutional: No fevers, no chills, no change in weight  Eye/Vision: See HPI  Ear/Nose/Mouth/Throat: See HPI; cough, no runny nose, no sore throat  Respiratory: No shortness of " "breath, no problems breathing  Cardiovascular: No chest pain  Gastrointestinal: See HPI, no diarrhea, no constipation  Genitourinary: No dysuria  Musculoskeletal: See HPI  Integumentary: No skin changes  Neurologic: See HPI  Psychiatric: Denies depression, denies anxiety, denies SI and HI.  Additional System Information:    Objective:     Vitals:    11/22/24 1044   BP: (!) 142/81   Pulse: 80       General: Alert and awake, Well nourished, Well groomed, No acute distress, no photophobia with 60 Hz hypersensitivity.  Eyes: Extraocular movements are intact; Normal conjunctiva; no nystagmus; Visual fields are intact bilaterally to finger counting in all cardinal directions  Neck: Supple  No Stiffness  Patient has occipital point tenderness over the right lesser occipital nerve without induction of headaches with no jump sign and no twitch response and no referred pain: trace   No high, medial cervical pain with lateral movement of C1 over C2 and with isometric neck flexion and extension  Fluid patient turnaround with concurrent neck movement in direction of torso movement.  Right paraspinal cervical muscle spasm present that is worse closer to the right shoulder  Spine/torso exam: Spine/ torso exam is within normal limits     Neurologic Exam  no saccadic intrusions of volitional ocular smooth pursuits  no pain with sustained upgaze and convergence  no visual motion sensitivity/dizziness produced with rapid eye movements or neck movements  no convergence insufficiency with no diplopia developed > 5 " accommodation    Sensory: Balance assessment deferred due to right knee injury    Gait: Gait limps with right leg    Labs:    No new labs    Imaging:    No new neurological studies    Assessment:       ICD-10-CM ICD-9-CM    1. Concussion with loss of consciousness, sequela  S06.0X9S 907.0       2. Subarachnoid hemorrhage following injury, with loss of consciousness, sequela  S06.6X9S 907.0       3. Cervicalgia of " rglhkffg-[id number]-phjel region  M54.2 723.1       4. Cervicogenic headache  G44.86 784.0       5. Occipital neuritis  M54.81 723.8       6. Fatigue due to sleep pattern disturbance  R53.83 780.79     G47.9 780.50       7. Chronic right shoulder pain  M25.511 719.41     G89.29 338.29          69 y.o. male with h/o DM, HTN, HLD, SAH and concussion with LOC, kinetic force injury with resultant cranio cervical trauma and occipital neuritis s/p medrol dose pack x1 who presents for follow up. Imaging from 6/13/23 injury revealed bilateral frontal and left temporal and right parietoccipital and right cerebellar hemorrhagic contusions with small amount of thin subdural blood along the falx and tentorium with left frontal lobe SAH and small SAH in right posterior frontoparietal region. On exam, he has occipital point tenderness over the right lesser occipital nerve without induction of headaches with no jump sign and no twitch response and no referred pain, right cervical paraspinal spasm and on initial exam vibratory loss in fingers and distal lower extremities, positive right head thrust. We discussed previously that he had a TBI given imaging evidence. We discussed previously based on imaging evidence, he may have problems with memory and executive functioning but he is not complaining of those functions at this time. We discussed previously exam also indicative of long fiber neuropathy that is likely 2/2 to DM and not related to his injury. Overall, his symptoms are worsening and mainly muscular. We discussed previously he is describing paresthesias where he hit his head and that a crushed nerve injury may have occurred in the scalp that can take up to 2 years to fully heal or may be permanent and should the paresthesias bother him or last longer, we could do medication for it and will continue gabapentin. We discussed again how his right shoulder can be impacting his neck symptoms and exam. At this time, workers comp has  delayed his care by not allowing ortho to evaluate his shoulder. Will get MRI C-Spine to evaluate for there etiologies of his neck pain.    This patient's diagnoses of concussion is a chronic complicated injury with multiple resultant symptoms as noted in the HPI as well as multiple subsequent diagnoses as a result of these injuries. I will be the primary provider who will both be providing and guiding ongoing medical care for these complex conditions.    Today's medical decision making was based on the number and complexity of problems addressed as documented in today's encounter, risks of complications due to prescription drug management as documented in today's encounter    Plan:     The patient was instructed to ice the occipital region for no more than 20 minutes at least once a day as needed but may repeat this as many times as they would like.   Can increase physical activity as tolerated  Continue vestibular PT exercises  You do not feel your feet well due to neuropathy. Please wear footwear with good tread at all times. This recommendation includes wearing socks with tread on them. Make sure all of your walkways, hallways, and julio are well lit at all times day and night. Use night lights to light up commonly used pathways in your home, meaning from the bedroom to the bathroom or kitchen. Make sure all of your walkways, hallways, and julio are clear of obstacles at all times. Obstacles include decorations, rugs/mats, pet beds, shoes, and clothes. Make sure you have good hand holds to grab onto as you walk around your home.  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 Delays in recovery may cause or further worsen any underlying permanent injury that was caused by the result of the patient's injury. Delays in recovery may also cause the development of new medical conditions that will then need to be treated. The development of some forms of permanent injuries may result in nerve injuries that are refractory to initial treatment, which could result in further medical expenses as more expensive methods of treatment or prolonged treatment may be needed to treat the underlying injury. In my collective clinical experience, my care plan as documented from the initial note leads to significant recovery from injuries similar to the patient's in the majority of my other patients. Delays in recovery will prevent the patient from returning to work full time in a timely fashion. Delays in recovery caused by workers compensation for patients with similar injury have also lead to significant financial settlements for the patients should a legal dispute arise. I have counseled the patient on all of the above.  Please be advised that I do not determine MMI and will update in the assessment whether patient is improving or not, or has plateaued to the point of permanent symptoms despite successfully following recommended treatment by myself  Please be advised that the above work status is re-evaluated each visit and that the nature of the patient's injury described in the assessment and diagnoses do not have a standard or expected timeline for recovery as demonstrated in multiple up to date peer-reviewed publications  Please be advised that I do not perform FCEs. I will also not answer or comment on any FCE questions.  Please be  advised I will not fill out additional paperwork that asks me to restate diagnoses, plan of care, work status, and/or accommodations as these are all documented in my clinic note.  Please be advised I will not fill out paperwork asking me to agree with a workers compensation representative's interpretation of my plan of care and/or evaluation of the patient as I stand by what is documented in my clinic note.  Please be advised that any peer to peer requests made on the behalf of workers compensation will need to be scheduled based on my availability and will be completed by myself within 1 week of the request. My clinic is not capable of doing on demand peer to peer requests with less than 48 hours of notice.     Chandrakant Laughlin MD  [facility]

## 2024-12-04 ENCOUNTER — PATIENT MESSAGE (OUTPATIENT)
Dept: NEUROLOGY | Facility: CLINIC | Age: 69
End: 2024-12-04
Payer: OTHER MISCELLANEOUS

## 2024-12-04 NOTE — TELEPHONE ENCOUNTER
Taye Garcia,    The MRI C-Spine order was placed near the end of last month if you could assist please. It also does not appear that an DARVIN for him has been uploaded into his chart.    Thanks,  Chandrakant

## 2025-01-02 ENCOUNTER — HOSPITAL ENCOUNTER (OUTPATIENT)
Dept: RADIOLOGY | Facility: HOSPITAL | Age: 70
Discharge: HOME | End: 2025-01-02
Attending: PSYCHIATRY & NEUROLOGY
Payer: OTHER MISCELLANEOUS

## 2025-01-02 DIAGNOSIS — M54.2 CERVICALGIA: ICD-10-CM

## 2025-01-02 PROCEDURE — 72141 MRI NECK SPINE W/O DYE: CPT | Mod: TC

## 2025-01-02 PROCEDURE — 72141 MRI NECK SPINE W/O DYE: CPT | Mod: 26,,, | Performed by: RADIOLOGY

## 2025-01-05 ENCOUNTER — PATIENT MESSAGE (OUTPATIENT)
Dept: NEUROLOGY | Facility: CLINIC | Age: 70
End: 2025-01-05
Payer: OTHER MISCELLANEOUS

## 2025-01-30 ENCOUNTER — OFFICE VISIT (OUTPATIENT)
Facility: CLINIC | Age: 70
End: 2025-01-30
Payer: OTHER MISCELLANEOUS

## 2025-01-30 VITALS — DIASTOLIC BLOOD PRESSURE: 80 MMHG | HEART RATE: 82 BPM | SYSTOLIC BLOOD PRESSURE: 137 MMHG

## 2025-01-30 DIAGNOSIS — S06.0X9S CONCUSSION WITH LOSS OF CONSCIOUSNESS, SEQUELA: Primary | ICD-10-CM

## 2025-01-30 DIAGNOSIS — G44.86 CERVICOGENIC HEADACHE: ICD-10-CM

## 2025-01-30 DIAGNOSIS — M54.2 CERVICALGIA OF OCCIPITO-ATLANTO-AXIAL REGION: ICD-10-CM

## 2025-01-30 DIAGNOSIS — G89.29 CHRONIC RIGHT SHOULDER PAIN: ICD-10-CM

## 2025-01-30 DIAGNOSIS — M25.511 CHRONIC RIGHT SHOULDER PAIN: ICD-10-CM

## 2025-01-30 DIAGNOSIS — S06.6X9S SUBARACHNOID HEMORRHAGE FOLLOWING INJURY, WITH LOSS OF CONSCIOUSNESS, SEQUELA: ICD-10-CM

## 2025-01-30 PROCEDURE — 99999 PR PBB SHADOW E&M-EST. PATIENT-LVL IV: CPT | Mod: PBBFAC,,, | Performed by: PSYCHIATRY & NEUROLOGY

## 2025-01-30 NOTE — PATIENT INSTRUCTIONS
The patient was instructed to ice the occipital region for no more than 20 minutes at least once a day as needed but may repeat this as many times as they would like.   Can increase physical activity as tolerated  Continue vestibular PT exercises  You do not feel your feet well due to neuropathy. Please wear footwear with good tread at all times. This recommendation includes wearing socks with tread on them. Make sure all of your walkways, hallways, and julio are well lit at all times day and night. Use night lights to light up commonly used pathways in your home, meaning from the bedroom to the bathroom or kitchen. Make sure all of your walkways, hallways, and julio are clear of obstacles at all times. Obstacles include decorations, rugs/mats, pet beds, shoes, and clothes. Make sure you have good hand holds to grab onto as you walk around your home.  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 Delays in recovery may cause or further worsen any underlying permanent injury that was caused by the result of the patient's injury. Delays in recovery may also cause the development of new medical conditions that will then need to be treated. The development of some forms of permanent injuries may result in nerve injuries that are refractory to initial treatment, which could result in further medical expenses as more expensive methods of treatment or  prolonged treatment may be needed to treat the underlying injury. In my collective clinical experience, my care plan as documented from the initial note leads to significant recovery from injuries similar to the patient's in the majority of my other patients. Delays in recovery will prevent the patient from returning to work full time in a timely fashion. Delays in recovery caused by workers compensation for patients with similar injury have also lead to significant financial settlements for the patients should a legal dispute arise. I have counseled the patient on all of the above.  Please be advised that I do not determine MMI and will update in the assessment whether patient is improving or not, or has plateaued to the point of permanent symptoms despite successfully following recommended treatment by myself  Please be advised that the above work status is re-evaluated each visit and that the nature of the patient's injury described in the assessment and diagnoses do not have a standard or expected timeline for recovery as demonstrated in multiple up to date peer-reviewed publications  Please be advised that I do not perform FCEs. I will also not answer or comment on any FCE questions.  Please be advised I will not fill out additional paperwork that asks me to restate diagnoses, plan of care, work status, and/or accommodations as these are all documented in my clinic note.  Please be advised I will not fill out paperwork asking me to agree with a workers compensation representative's interpretation of my plan of care and/or evaluation of the patient as I stand by what is documented in my clinic note.  Please be advised that any peer to peer requests made on the behalf of workers compensation will need to be scheduled based on my availability and will be completed by myself within 1 week of the request. My clinic is not capable of doing on demand peer to peer requests with less than 48 hours of notice.

## 2025-01-30 NOTE — PROGRESS NOTES
Chief Complaint: Headache    Subjective:     History of Present Illness    Referring Provider: ED  Date of Injury: 6/13/23  Accompanied by: Daughter  Workers Comp     09/11/2023: Carlin Madrigal is a 68 y.o. male with h/o DM, HTN, HLD who presents for concussion evaluation. 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 Currently, headaches are right occipital, can last all day, 2-3 times a week, pounding. Per daughter, he has described head as bloated. He has a scar on right side of neck from the above injury. He has lower neck pain that is new since above injury. He has associated phonophobia per daughter, weakness in legs, imbalance that is new since above injury, spinning that was present prior to injury that is worse since above injury. He denies photophobia, numbness, tingling, lightheadedness, N/V. He has been having dry mouth. He notes fluctuating vision from diabetes and denies vision changes since above injury. He denies changes in taste, smell, hearing, other vision changes, appetite. He denies tinnitus. Per daughter, he gets bilateral jaw pain for the last 3 weeks that is sharp that is new since above injury and has looked swollen when eating before. He denies cognitive fogginess, concentration difficulties, and memory changes. He is sleeping well and wakes up rested. He has daytime fatigue. He snores and denies it worsening since above injury and denies apnea. He denies positional component but headache worsens when lays on right side and vasal vagal maneuvers do not significantly worsen headaches. He denies headaches waking him up and  does not wake up with headaches. Mood is okay and per daughter he is easily irritable since the above injury that he agrees with. He denies emotional lability. He has only tried Tylenol. He was just prescribed PT to help with headaches. He denies other prior head and neck injuries. Prior to current injury, headaches would be once every 2 weeks from being out in the sun and no associated photophobia, phonophobia, weakness, numbness, tingling, N/V, change in vision, aura and would not stop ADLs. Glucose most recently 124 and has been 160s and 180s.     Per ED note dated 6/13/23, per daughter he passed out at work today per coworkers and upon awakening could not remember where he was or what happened, he reports becoming dizzy but does not remember passing out     Per ED note dated 5/25/23, he has had 1 syncopal episode, has had intermittent blurred vision and dizziness described as lightheadedness that started 1 week ago, 1 week of right shoulder pain, and neck pain that began 2 weeks ago and denies trauma or injuries to the area and was sudden onset.     09/27/2023: Carlin Madrigal is a 68 y.o. male  with h/o DM, HTN, HLD, SAH and concussion with LOC, kinetic force injury with resultant cranio cervical trauma and occipital neuritis s/p medrol dose pack x1 who presents for follow up. On last visit, patient was prescribed a Medrol dose pack and started on ice therapy, ergonomics, and exercise restrictions and referred for vestibular PT and not to start neck PT and counseled on neuropathy precautions. In the interim, stopped Medrol as glucose went > 200. Offered patient translational services if needed and he declines at this time and let him know it is a free service should he choose to need it during the visit. He feels better compared to when injury first happened. Headaches are 1-3 times a week per a calendar he shows me, right occipital, cannot describe pain quality, lasts whole day but not into next day. He denies  neck pain. He describes generalized weakness. He denies photophobia, phonophobia, numbness, tingling, dizziness, N/V. He thinks he has difficulties seeing from diabetes and denies changes from his injury. He is sleeping well and wakes up feeling well. Mood is okay. He stopped taking Medrol on day 3 of the pack as his glucose was going > 200 and denies side effects. He is icing. He has not heard about vestibular PT. He is not doing neck PT. He is following neuropathy precautions.      11/08/2023: Carlin Madrigal is a 68 y.o. male with h/o DM, HTN, HLD, SAH and concussion with LOC, kinetic force injury with resultant cranio cervical trauma and occipital neuritis s/p medrol dose pack x1 who presents for follow up. On last visit, patient was referred for lower neck/upper back to mid back PT with myofascial release and continued on ice therapy, ergonomics, and exercise restrictions and referred for vestibular PT and counseled on neuropathy precautions. Offered patient translational services if needed and he declines at this time and let him know it is a free service should he choose to need it during the visit. He states that his head does not hurt but describes pins and needles sensation in right occipital region that can happen multiple times a day or not at all and feels it when turns head to the right and lasts 3-4 minutes. He denies neck pain. He denies photophobia, phonophobia, numbness, tingling, N/V, dizziness. He describes difficulties with far vision that he thinks may be from his diabetes. He describes generalized weakness and describes will get cramp in right posterior leg when tries to lift something. He is sleeping well and wakes up rested. Mood is good. He is doing neck PT and is helping and is doing vestibular PT that is helping but still feels imbalance.  He is following neuropathy precautions. He is not icing. He describes bilateral jaw pain and states jaw will get swollen when tries to chew. He states he  has upcoming appointment with dentist. He has new feeling that smells are staying in his nose.     01/11/2024: Carlin aMdrigal is a 68 y.o. male with h/o DM, HTN, HLD, SAH and concussion with LOC, kinetic force injury with resultant cranio cervical trauma and occipital neuritis s/p medrol dose pack x1 who presents for follow up. On last visit, patient was continued on lower neck/upper back to mid back PT with myofascial release, ice therapy, ergonomics, and exercise restrictions and vestibular PT and counseled on neuropathy precautions. Offered patient translational services if needed and he declines at this time and let him know it is a free service should he choose to need it during the visit. He states he is doing well. He has slight pain in right base of skull, near daily for this week, 10 mins, triggered when turns head to the right, bothersome pain is best pain description, denies other headaches. He denies neck pain. He has right shoulder pain and sometimes has right hand weakness as a result. He states someone is trying to get him a shoulder specialist but he has not heard from one and is agreeable to a referral from me.  He denies photophobia, phonophobia, numbness, tingling, dizziness, N/V, change in vision. He is sleeping well and right lateral shoulder pain will wake him up and wakes up ready to go. Mood is good. He finished neck PT and does home exercises and PT helped. He is not needing to ice. He does vestibular PT exercises at home as he finished it and helped him. He is following neuropathy precautions. He denies side effects to gabapentin he is on and is taking 300 mg QAM and QPM only.     03/13/2024: Carlin Madrigal is a 69 y.o. male with h/o DM, HTN, HLD, SAH and concussion with LOC, kinetic force injury with resultant cranio cervical trauma and occipital neuritis s/p medrol dose pack x1 who presents for follow up. On last visit, patient was continued on neck exercises, ice therapy and to increase  physical activity as tolerated and continued vestibular PT exercises and counseled on neuropathy precautions and referred for ortho and increased gabapentin. Offered patient translational services if needed and he declines at this time and let him know it is a free service should he choose to need it during the visit. He indicates he is still having right upper cervical paraspinal pain that can radiate down right side of neck. He denies headaches. He states when he has neck pain he has to go lay down. He has generalized weakness. He still has right shoulder pain with numbness in right shoulder. He states he will sometimes have vision problems due to his diabetes. He denies photophobia, phonophobia, tingling, N/V, dizziness. He is sleeping well but his right shoulder will bother him and wakes up rested. Mood is good. He is doing neck exercises. He is doing vestibular PT exercises. He is following neuropathy precautions. He states increased gabapentin is helping and denies side effects. He has appointment for ortho and per daughter workers comp denied coverage for it. Per daughter, 1 month ago he was driving he could not turn his neck and did not know where he was and had to pull over and took 20 minutes before he could go back home. 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 Per he and daughter, head feels heavy and he indicates heaviness in occipital region. Per daughter, he has been complaining of jaw pain since he got hurt and workers comp has denied jaw evaluation. Per daughter, going back to work was a failure due to getting exhausted. Per daughter, he had to be started on a new blood pressure medication because blood pressure has been elevated.     04/24/2024: Carlin Madrigal is a 69 y.o. male with h/o DM, HTN, HLD, SAH and concussion with LOC, kinetic force injury  with resultant cranio cervical trauma and occipital neuritis s/p medrol dose pack x1 who presents for follow up. On last visit, patient was referred for lower neck/upper back to mid back PT with myofascial release and continued on ice therapy and to increase physical activity as tolerated and continued vestibular PT exercises and counseled on neuropathy precautions and referred for ortho and continued gabapentin. Offered patient translational services if needed and he declines at this time and let him know it is a free service should he choose to need it during the visit. He is doing better and neck PT is helping and is doing it right times a week. He has right sided neck pain that he feels more if turns head to the right too much. He has slight right occipital pinpoint headache, denies any pain characteristics, triggered by turning head to the right, resolves with turning neck back. He denies photophobia, phonophobia, weakness, numbness, tingling, dizziness, N/V, change in vision. He is sleeping well and wakes up rested. Mood is okay. He is not icing. He does light lifting in PT and PT is also working on right shoulder. He is doing vestibular exercises. He is following neuropathy precautions. He saw someone for his shoulder but unclear per description if that was thru his PCP or thru ortho. He is taking gabapentin and denies side effects.     06/05/2024: Carlin Madrigal is a 69 y.o. male with h/o DM, HTN, HLD, SAH and concussion with LOC, kinetic force injury with resultant cranio cervical trauma and occipital neuritis s/p medrol dose pack x1 who presents for follow up. On last visit, patient was continued on lower neck/upper back to mid back PT with myofascial release, ice therapy and to increase physical activity as tolerated and continued vestibular PT exercises and counseled on neuropathy precautions and referred for ortho and continued gabapentin and to do work hardening. Offered patient translational services  if needed and he declines at this time and let him know it is a free service should he choose to need it during the visit. He states he still has limited ROM when turns head to the right and going to far causes pain and stretching in right side of neck and other ROM directions is better. Headaches are behind right ear, once a week, 10 mins, best described as a little pain. He has no neck pain unless he goes to far to the right with his head. He feels weak in upper back when tries to do physical activities like cleaning his yard. He denies photophobia, phonophobia, numbness, tingling, N/V, dizziness. He sometimes has difficulties with small text. He states his right shoulder is not getting better and got Xray and told may need MRI and has not seen ortho and does not know which doctor ordered the xrays other than it was the doctor who treats his diabetes. He is sleeping well and wakes up rested. He states he cannot sleep on his right side due to his right shoulder. Mood is okay. He is doing neck PT and is helping. He is not icing. He is doing vestibular exercises. He is following neuropathy precautions. He is taking gabapentin and denies side effects. He states PT has tried to mimic a work environment.     11/22/2024: Carlin Madrigal is a 69 y.o. male with h/o DM, HTN, HLD, SAH and concussion with LOC, kinetic force injury with resultant cranio cervical trauma and occipital neuritis s/p medrol dose pack x1 who presents for follow up. On last visit, patient was continued on lower neck/upper back to mid back PT with myofascial release, ice therapy and to increase physical activity as tolerated and continued vestibular PT exercises and counseled on neuropathy precautions and referred for ortho and continued gabapentin and to do work hardening. Offered patient translational services if needed and he declines at this time and let him know it is a free service should he choose to need it during the visit. He states he is not  doing well as still has left base of skull pain and left cervical paraspinal pain. Headaches are daily, 15-20 mins, right occipital, 5-6 out of 10 pain, sharp. He associated BUE weakness and cannot  anything heavy. He has difficulties with near vision that he thinks may be from diabetes. He denies photophobia, phonophobia, numbness, tingling, N/V, dizziness. He has right shoulder pain and right knee pain and indicates right knee was swollen. He is sleeping well and wakes up 2-3 times to use the restroom and sometimes wakes up tired. He gets fatigued quickly. Mood is okay and per daughter, his mood has progressively worsened and easily gets irritable and has mood swings. He stopped neck PT and per daughter, he stopped going as pain got worse when doing PT. He states he never got dry needling. He is not icing.  He is doing vestibular exercises. He is following neuropathy precautions. Per daughter, he had a second opinion from workers comp. Per daughter, ortho was denied by workers comp so went thru his PCP who ordered xray of shoulder. Per daughter, she would like to get MRI of C-Spine. Per daughter, PCP has referred him for oral maxillofacial for right jaw pain as workers comp was not helping with it. Per daughter, he injured his right knee trying to lift a refrigerator with his son and due to his arm weakness he dropped the refrigerator on his knee. He states he is not taking gabapentin and per he and daughter, no side effects and per daughter, he ran out of it. He notes his head/neck pain was less frequent on the gabapentin. He states PT did not do work hardening.     01/30/2025: Carlin Madrigal is a 70 y.o. male with h/o DM, HTN, HLD, SAH and concussion with LOC, kinetic force injury with resultant cranio cervical trauma and occipital neuritis s/p medrol dose pack x1 who presents for follow up. On last visit, patient was continued on ice therapy and to increase physical activity as tolerated and continued  "vestibular PT exercises and counseled on neuropathy precautions and referred for ortho and continued gabapentin and MRI C-Spine ordered. Offered patient translational services if needed and he declines at this time and let him know it is a free service should he choose to need it during the visit. He denies getting headaches when looking straight ahead most of the time but can happen in right occipital when sitting down. He states his head still hurts him and when turns head to the left or right feels a stretch in right occipital region and pain resolves when turns head back to normal. He has right sided neck pain. Per daughter, his head feels heavy and he states the same thing and states when gets this feeling he gets pain in right shoulder blade. He denies photophobia, phonophobia, numbness, tingling, N/V, dizziness, change in vision. He is sleeping well and wakes up rested. Mood is good and per daughter it is terrible with a lot of mood swings. He is icing. He is doing vestibular exercises. He is following neuropathy precautions. He is taking gabapentin and denies side effects and he thinks it helps. Per daughter, PCP did shoulder MRI and PCP is linking right shoulder injury to his work injury and MRI report per daughter shows multiple tears and is forming a cyst on bone. He states he cannot sleep on right side due to right shoulder pain. He notes he hurt his right knee since last visit when dropped something on it and is getting MRI on it and told not gout.     Current Outpatient Medications on File Prior to Visit   Medication Sig Dispense Refill    BD ULTRA-FINE MINI PEN NEEDLE 31 gauge x 3/16" Ndle SMARTSI Each SUB-Q Twice Daily      bismuth subsalicylate (BISMAREX) 262 mg Chew Take 262 mg by mouth 4 (four) times daily.      diclofenac sodium (VOLTAREN) 1 % Gel Apply 2 g topically 3 (three) times daily. 100 g 0    ergocalciferol (ERGOCALCIFEROL) 50,000 unit Cap Vitamin D2 1,250 mcg (50,000 unit) capsule   " TAKE ONE CAPSULE BY MOUTH EVERY WEEK      gabapentin (NEURONTIN) 300 MG capsule Take 1 capsule (300 mg total) by mouth 3 (three) times daily. 90 capsule 11    levothyroxine (SYNTHROID) 50 MCG tablet Take 50 mcg by mouth every morning.      lisinopriL (PRINIVIL,ZESTRIL) 40 MG tablet Take 40 mg by mouth Daily.      metFORMIN (GLUCOPHAGE) 1000 MG tablet Take 1,000 mg by mouth 2 (two) times a day.      NOVOLIN 70/30 U-100 INSULIN 100 unit/mL (70-30) injection Inject 15 Units into the skin 2 (two) times a day.      pantoprazole (PROTONIX) 40 MG tablet Take 40 mg by mouth 2 (two) times daily.      pravastatin (PRAVACHOL) 20 MG tablet Take 20 mg by mouth Daily.      amLODIPine (NORVASC) 5 MG tablet Take 1 tablet (5 mg total) by mouth once daily. 30 tablet 1    metroNIDAZOLE (FLAGYL) 500 MG tablet Take 500 mg by mouth 3 (three) times daily. (Patient not taking: Reported on 1/30/2025)       No current facility-administered medications on file prior to visit.       Review of patient's allergies indicates:  No Known Allergies    No family history on file.    Social History     Tobacco Use    Smoking status: Former     Types: Cigarettes     Passive exposure: Never    Smokeless tobacco: Never   Substance Use Topics    Alcohol use: Not Currently     Comment: 06/13/23: occ    Drug use: Not Currently       Review of Systems  Constitutional: No fevers, no chills, no change in weight  Eye/Vision: See HPI  Ear/Nose/Mouth/Throat: See HPI; no cough, no runny nose, no sore throat  Respiratory: No shortness of breath, no problems breathing  Cardiovascular: No chest pain  Gastrointestinal: See HPI, no diarrhea, no constipation  Genitourinary: No dysuria  Musculoskeletal: See HPI  Integumentary: No skin changes  Neurologic: See HPI  Psychiatric: Denies depression, denies anxiety, denies SI and HI.  Additional System Information:     Objective:     Vitals:    01/30/25 0943   BP: 137/80   Pulse: 82       General: Alert and awake, Well  "nourished, Well groomed, No acute distress, no photophobia with 60 Hz hypersensitivity.  Eyes: Extraocular movements are intact; Normal conjunctiva; no nystagmus; Visual fields are intact bilaterally to finger counting in all cardinal directions  Neck: Supple  No Stiffness  Patient has no occipital point tenderness over the bilateral greater and lesser occipital nerve without induction of headaches with no jump sign and no twitch response and no referred pain: 0+   No high, medial cervical pain with lateral movement of C1 over C2 and with isometric neck flexion and extension  Fluid patient turnaround with concurrent neck movement in direction of torso movement.  Right paraspinal cervical muscle spasm present  Spine/torso exam: Spine/ torso exam is within normal limits     Neurologic Exam  no saccadic intrusions of volitional ocular smooth pursuits  no pain with sustained upgaze and convergence  no visual motion sensitivity/dizziness produced with rapid eye movements or neck movements  no convergence insufficiency with no diplopia developed > 5 " accommodation    Sensory: Negative Romberg, no falls on tandem stance    Gait: Gait WNL, Heel to toe walking WNL    Labs:    No new labs    Imaging:    I personally reviewed all diagnostic images and reports and agree with findings in the radiographical report as noted below unless otherwise specified.    MRI C-Spine 1/2/25:  FINDINGS:  C1-C2: Dens is intact.  Pre dens space is maintained.     Alignment: Grade 1 anterolisthesis noted at C4-C5.     Vertebrae: No fracture or marrow infiltrative process.     Discs: Moderate disc height loss at C5-C6.     Cord: Normal.     Skull base and craniocervical junction: Normal.     Degenerative findings:     C2-C3: Uncovertebral spurring and moderate facet arthropathy result in mild left neural foraminal narrowing.     C3-C4: Posterior disc osteophyte complex with uncovertebral spurring and moderate facet arthropathy result in moderate " spinal canal stenosis and moderate bilateral neural foraminal narrowing.     C4-C5: Posterior disc osteophyte complex with uncovertebral spurring and moderate facet arthropathy result in moderate right, mild left neural foraminal narrowing.     C5-C6: Posterior disc osteophyte complex with uncovertebral spurring result in mild spinal canal stenosis and moderate bilateral neural foraminal narrowing.     C6-C7: No spinal canal stenosis or neural foraminal narrowing.     C7-T1: No spinal canal stenosis or neural foraminal narrowing.     Paraspinal muscles & soft tissues: Unremarkable.     Impression:     1. Multilevel degenerative changes of the cervical spine detailed above.  Moderate spinal canal stenosis at C3-C4.  Moderate neural foraminal narrowing at C3-C6.    My read: No acute spinal cord abnormalities. Significant stenosis at central C3-C4, bilateral foraminal C3-C4, right foraminal C4-C5, bilateral foraminal C5-C6    Assessment:       ICD-10-CM ICD-9-CM    1. Concussion with loss of consciousness, sequela  S06.0X9S 907.0       2. Subarachnoid hemorrhage following injury, with loss of consciousness, sequela  S06.6X9S 907.0       3. Cervicalgia of [id number] region  M54.2 723.1       4. Cervicogenic headache  G44.86 784.0       5. Chronic right shoulder pain  M25.511 719.41     G89.29 338.29          70 y.o. male with h/o DM, HTN, HLD, SAH and concussion with LOC, kinetic force injury with resultant cranio cervical trauma and occipital neuritis s/p medrol dose pack x1 who presents for follow up. Imaging from 6/13/23 injury revealed bilateral frontal and left temporal and right parietoccipital and right cerebellar hemorrhagic contusions with small amount of thin subdural blood along the falx and tentorium with left frontal lobe SAH and small SAH in right posterior frontoparietal region. On exam, he has right cervical paraspinal spasm and on initial exam vibratory loss in fingers and distal lower  extremities, positive right head thrust. We discussed previously that he had a TBI given imaging evidence. We discussed previously based on imaging evidence, he may have problems with memory and executive functioning but he is not complaining of those functions at this time. We discussed previously exam also indicative of long fiber neuropathy that is likely 2/2 to DM and not related to his injury. Overall, his symptoms are worsening and mainly muscular. We discussed previously he is describing paresthesias where he hit his head and that a crushed nerve injury may have occurred in the scalp that can take up to 2 years to fully heal or may be permanent and should the paresthesias bother him or last longer, we could do medication for it and will continue gabapentin. We discussed yet again how his right shoulder can be impacting his neck symptoms and exam. At this time, workers [facility] has delayed his care by not allowing ortho to evaluate his shoulder. 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 However, per his evaluation, his presentation is much more consistent now with shoulder pathology contributing to the majority of his symptoms and less likely coming from his neck so it is imperative that a shoulder specialist be allowed to address his right shoulder.      This patient's diagnoses of concussion is a chronic complicated injury with multiple resultant symptoms as noted in the HPI as well as multiple subsequent diagnoses as a result of these injuries. I will be the primary provider who will both be providing and guiding ongoing medical care for these complex conditions.    Today's medical decision making was based on the number and complexity of problems addressed as documented in today's encounter, risks of complications due to prescription drug management as  documented in today's encounter    Plan:     The patient was instructed to ice the occipital region for no more than 20 minutes at least once a day as needed but may repeat this as many times as they would like.   Can increase physical activity as tolerated  Continue vestibular PT exercises  You do not feel your feet well due to neuropathy. Please wear footwear with good tread at all times. This recommendation includes wearing socks with tread on them. Make sure all of your walkways, hallways, and julio are well lit at all times day and night. Use night lights to light up commonly used pathways in your home, meaning from the bedroom to the bathroom or kitchen. Make sure all of your walkways, hallways, and juilo are clear of obstacles at all times. Obstacles include decorations, rugs/mats, pet beds, shoes, and clothes. Make sure you have good hand holds to grab onto as you walk around your home.  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 Delays in recovery may cause or further worsen any underlying permanent injury that was caused by the result of the patient's injury. Delays in recovery may also cause the development of new medical conditions that will then need to be treated. The development of some forms of permanent injuries may result in nerve injuries that are refractory to initial treatment, which could result in further medical expenses  as more expensive methods of treatment or prolonged treatment may be needed to treat the underlying injury. In my collective clinical experience, my care plan as documented from the initial note leads to significant recovery from injuries similar to the patient's in the majority of my other patients. Delays in recovery will prevent the patient from returning to work full time in a timely fashion. Delays in recovery caused by workers compensation for patients with similar injury have also lead to significant financial settlements for the patients should a legal dispute arise. I have counseled the patient on all of the above.  Please be advised that I do not determine MMI and will update in the assessment whether patient is improving or not, or has plateaued to the point of permanent symptoms despite successfully following recommended treatment by myself  Please be advised that the above work status is re-evaluated each visit and that the nature of the patient's injury described in the assessment and diagnoses do not have a standard or expected timeline for recovery as demonstrated in multiple up to date peer-reviewed publications  Please be advised that I do not perform FCEs. I will also not answer or comment on any FCE questions.  Please be advised I will not fill out additional paperwork that asks me to restate diagnoses, plan of care, work status, and/or accommodations as these are all documented in my clinic note.  Please be advised I will not fill out paperwork asking me to agree with a workers compensation representative's interpretation of my plan of care and/or evaluation of the patient as I stand by what is documented in my clinic note.  Please be advised that any peer to peer requests made on the behalf of workers compensation will need to be scheduled based on my availability and will be completed by myself within 1 week of the request. My clinic is not capable of doing on demand peer to peer requests with  less than 48 hours of notice.    Chandrakant Laughlin MD  [facility]

## 2025-02-26 NOTE — HOSPITAL COURSE
Carlin Madrigal was placed under observation for management of ABDIRIZAK.    Throuhgout his observation stay, Mr. Madrigal was started on IV fluids, which allowed for resolution of his ABDIRIZAK noted on admission labs. Due to recurrent falls at work, CT Head was ordered which noted left frontal contusion with small foci of subarachnoid and parenchymal hemorrhage and minimal left subarachnoid help. General Neurology was consulted and recommended further neurosurgery evaluation of traumatic ICH. Patient continues to endorse severe back pain along with head pain, and PT/OT was consulted. PT evaluated the patient has recommended patient receive therapy while under observation and is without further recommendations upon discharge. Echocardiogram was ordered which showed normal LV/RV size and systolic function with EF of 60%. Neurosurgery recommended repeat CT to ensure stability and MRI brain. Patient had multiple CT head which noted stability of hemorrhage and patient had no further symptoms and doing well. ON day of discharge, MRI was done and patient cleared for discharge by Neurosurgery. Will need to follow up in 2 weeks as outpatient. Discussed findings with patient and wife, all questions answered and patient discharged in stable condition.  
98.9